# Patient Record
Sex: MALE | Race: WHITE | NOT HISPANIC OR LATINO | Employment: FULL TIME | URBAN - METROPOLITAN AREA
[De-identification: names, ages, dates, MRNs, and addresses within clinical notes are randomized per-mention and may not be internally consistent; named-entity substitution may affect disease eponyms.]

---

## 2017-02-13 ENCOUNTER — TELEPHONE (OUTPATIENT)
Dept: FAMILY MEDICINE | Facility: OTHER | Age: 42
End: 2017-02-13

## 2017-02-13 DIAGNOSIS — E11.9 TYPE 2 DIABETES, HBA1C GOAL < 7% (H): Primary | ICD-10-CM

## 2017-02-13 NOTE — TELEPHONE ENCOUNTER
Summary:    Patient is due/failing the following:   TSH, A1C and FOLLOW UP    Action needed:   Patient needs office visit for diabetic follow up. and Patient needs non-fasting lab only appointment    Type of outreach:    Phone, spoke to patient.  patient scheduled OV on 02/23/17    Questions for provider review:    None                                                                                                                                    Marcela Collins       Chart routed to Care Team .        Panel Management Review      Patient has the following on his problem list:     Diabetes    ASA:     Last A1C  Lab Results   Component Value Date    A1C 10.3 11/07/2016    A1C 6.7 02/18/2015     A1C tested: Failed    Last LDL:    Lab Results   Component Value Date    CHOL 149 12/16/2016     Lab Results   Component Value Date    HDL 65 12/16/2016     Lab Results   Component Value Date    LDL 63 12/16/2016     Lab Results   Component Value Date    TRIG 104 12/16/2016     Lab Results   Component Value Date    CHOLHDLRATIO 2.4 03/13/2015     Lab Results   Component Value Date    NHDL 84 12/16/2016       Is the patient on a Statin? NO             Is the patient on Aspirin? NO        Last three blood pressure readings:  BP Readings from Last 3 Encounters:   11/07/16 138/80   10/31/16 128/81   03/06/15 116/82            Tobacco History:     History   Smoking Status     Current Some Day Smoker     Types: Dip, chew, snus or snuff, Cigarettes   Smokeless Tobacco     Current User           Composite cancer screening  Chart review shows that this patient is due/due soon for the following None

## 2017-02-21 NOTE — PROGRESS NOTES
SUBJECTIVE:                                                    Estevan Callejas is a 41 year old male who presents to clinic today for the following health issues:      HPI    RESPIRATORY SYMPTOMS      Duration: 1 month    Description  nasal congestion, facial pain/pressure, cough and hoarse voice    Severity: moderate    Accompanying signs and symptoms: None    History (predisposing factors):  none    Precipitating or alleviating factors: None    Therapies tried and outcome:  antihistamine antibiotic- little relief     Diabetes Follow-up      Patient is checking blood sugars: 2-3 times a week    Diabetic concerns: None     Symptoms of hypoglycemia (low blood sugar): none     Paresthesias (numbness or burning in feet) or sores: Yes right foot burning     Date of last diabetic eye exam: hasn't had one       Problem list and histories reviewed & adjusted, as indicated.  Additional history: as documented        Patient Active Problem List   Diagnosis     Peripheral neuropathy - near S2 dermatome     Type 2 diabetes, HbA1c goal < 7% (H)     Low back pain     History reviewed. No pertinent past surgical history.    Social History   Substance Use Topics     Smoking status: Current Some Day Smoker     Types: Dip, chew, snus or snuff, Cigarettes     Smokeless tobacco: Current User     Alcohol use Yes     History reviewed. No pertinent family history.      Current Outpatient Prescriptions   Medication Sig Dispense Refill     gabapentin (NEURONTIN) 300 MG capsule Take 1 tablet (300 mg) every night for 1-3 days, then 1 tablet twice daily for 1-3 days, then 1 tablet three times daily 90 capsule 0     lisinopril (PRINIVIL/ZESTRIL) 2.5 MG tablet Take 1 tablet (2.5 mg) by mouth daily 90 tablet 1     azithromycin (ZITHROMAX) 250 MG tablet Two tablets first day, then one tablet daily for four days. 6 tablet 0     aspirin 81 MG tablet Take 1 tablet (81 mg) by mouth daily 90 tablet 3     metFORMIN (GLUCOPHAGE) 500 MG tablet Start 1  pill(500mg ) -2 times a day. After 10 days - increase to 2 pills (1000mg ) -2 times a day 120 tablet 2     blood glucose monitoring (NO BRAND SPECIFIED) test strip Use to test blood sugars  One  times daily in am 90 each 3     blood glucose monitoring (NO BRAND SPECIFIED) meter device kit Use to test blood sugar one times daily or as directed. 1 kit 0     blood glucose (NO BRAND SPECIFIED) lancets standard Use to test blood sugar one times daily or as directed. 1 Box 0     No Known Allergies  BP Readings from Last 3 Encounters:   03/09/17 132/84   02/23/17 130/74   11/07/16 138/80    Wt Readings from Last 3 Encounters:   03/09/17 245 lb (111.1 kg)   02/23/17 244 lb (110.7 kg)   11/07/16 260 lb (117.9 kg)                  Labs reviewed in EPIC    ROS:  Constitutional, HEENT, cardiovascular, pulmonary, gi and gu systems are negative, except as otherwise noted.    OBJECTIVE:                                                    /84  Pulse 64  Temp 98.8  F (37.1  C) (Temporal)  Resp 16  Wt 245 lb (111.1 kg)  BMI 34.66 kg/m2  Body mass index is 34.66 kg/(m^2).  Physical Exam   Constitutional: He is oriented to person, place, and time. He appears well-developed and well-nourished.   HENT:   Head: Normocephalic and atraumatic.   Cardiovascular: Normal rate and regular rhythm.    Pulmonary/Chest: Effort normal and breath sounds normal.   Neurological: He is alert and oriented to person, place, and time.   Psychiatric: He has a normal mood and affect.         Diagnostic Test Results:  none      ASSESSMENT/PLAN:                                                      Problem List Items Addressed This Visit     Type 2 diabetes, HbA1c goal < 7% (H) - Primary     -Last a1- improved  -Home fasting Blood sugars- well controlled  -Last eye exam- Not UTD-   -Diabetic foot exam - mild neuropathy  -Last urine microalbumin-On ACE  -Last LDL-Continue statin  -Continue current medications  -Discussed diet , lifestyle modifications,  exercise and weight loss  -RTC in 3-6 months for follow up             Relevant Medications    gabapentin (NEURONTIN) 300 MG capsule    lisinopril (PRINIVIL/ZESTRIL) 2.5 MG tablet    aspirin 81 MG tablet    Other Relevant Orders    Hemoglobin A1c      Other Visit Diagnoses     Screening for diabetic retinopathy        Relevant Orders    OPHTHALMOLOGY ADULT REFERRAL    Screening for diabetic peripheral neuropathy        Relevant Orders    FOOT EXAM  NO CHARGE [30130.114] (Completed)    Need for prophylactic vaccination and inoculation against influenza        Need for prophylactic vaccination against Streptococcus pneumoniae (pneumococcus)        Acute bronchitis, unspecified organism        Relevant Medications    azithromycin (ZITHROMAX) 250 MG tablet         Lawanda Barrera MD  [facility]

## 2017-02-23 ENCOUNTER — OFFICE VISIT (OUTPATIENT)
Dept: FAMILY MEDICINE | Facility: OTHER | Age: 42
End: 2017-02-23
Payer: COMMERCIAL

## 2017-02-23 VITALS
BODY MASS INDEX: 34.16 KG/M2 | OXYGEN SATURATION: 96 % | SYSTOLIC BLOOD PRESSURE: 130 MMHG | HEART RATE: 80 BPM | WEIGHT: 244 LBS | RESPIRATION RATE: 12 BRPM | DIASTOLIC BLOOD PRESSURE: 74 MMHG | HEIGHT: 71 IN | TEMPERATURE: 97.8 F

## 2017-02-23 DIAGNOSIS — R07.0 THROAT PAIN: Primary | ICD-10-CM

## 2017-02-23 DIAGNOSIS — J01.00 ACUTE NON-RECURRENT MAXILLARY SINUSITIS: ICD-10-CM

## 2017-02-23 PROCEDURE — 99213 OFFICE O/P EST LOW 20 MIN: CPT | Performed by: PHYSICIAN ASSISTANT

## 2017-02-23 RX ORDER — AMOXICILLIN 875 MG
875 TABLET ORAL 2 TIMES DAILY
Qty: 20 TABLET | Refills: 0 | Status: SHIPPED | OUTPATIENT
Start: 2017-02-23 | End: 2017-03-09

## 2017-02-23 ASSESSMENT — PAIN SCALES - GENERAL: PAINLEVEL: NO PAIN (0)

## 2017-02-23 NOTE — MR AVS SNAPSHOT
"              After Visit Summary   2/23/2017    Estevan Callejas    MRN: [id number]           Patient Information     Date Of Birth          1975        Visit Information        Provider Department      2/23/2017 12:00 PM Janay Gracia PA-C [facility]        Today's Diagnoses     Throat pain    -  1    Acute non-recurrent maxillary sinusitis           Follow-ups after your visit        Your next 10 appointments already scheduled     Mar 09, 2017  5:40 PM CST   Office Visit with Lawanda Barrera MD   [facility]    [address] [place] [address] [id number] [phone]           Bring a current list of meds and any records pertaining to this visit.  For Physicals, please bring immunization records and any forms needing to be filled out.  Please arrive 10 minutes early to complete paperwork.              Who to contact     If you have questions or need follow up information about today's clinic visit or your schedule please contact [facility] directly at [phone].  Normal or non-critical lab and imaging results will be communicated to you by Servhawkhart, letter or phone within 4 business days after the clinic has received the results. If you do not hear from us within 7 days, please contact the clinic through Keisenset or phone. If you have a critical or abnormal lab result, we will notify you by phone as soon as possible.  Submit refill requests through Big Frame or call your pharmacy and they will forward the refill request to us. Please allow 3 business days for your refill to be completed.          Additional Information About Your Visit        [email or web address] Information     Big Frame lets you send messages to your doctor, view your test results, renew your prescriptions, schedule appointments and more. To sign up, go to [email or web address] . Click on \"Log in\" on the left side of the screen, which will take you to the Welcome page. " "Then click on \"Sign up Now\" on the right side of the page.     You will be asked to enter the access code listed below, as well as some personal information. Please follow the directions to create your username and password.     Your access code is: [email or web address]  Expires: 2017 12:09 PM     Your access code will  in 90 days. If you need help or a new code, please call your [place] clinic or [phone].        Care EveryWhere ID     This is your Care EveryWhere ID. This could be used by other organizations to access your [place] medical records  [id number]        Your Vitals Were     Pulse Temperature Respirations Height Pulse Oximetry BMI (Body Mass Index)    80 97.8  F (36.6  C) (Temporal) 12 5' 10.5\" (1.791 m) 96% 34.52 kg/m2       Blood Pressure from Last 3 Encounters:   17 130/74   16 138/80   10/31/16 128/81    Weight from Last 3 Encounters:   17 244 lb (110.7 kg)   16 260 lb (117.9 kg)   10/31/16 258 lb (117 kg)              Today, you had the following     No orders found for display         Today's Medication Changes          These changes are accurate as of: 17  1:16 PM.  If you have any questions, ask your nurse or doctor.               Start taking these medicines.        Dose/Directions    amoxicillin 875 MG tablet   Commonly known as:  AMOXIL   Used for:  Acute non-recurrent maxillary sinusitis   Started by:  Janay Gracia PA-C        Dose:  875 mg   Take 1 tablet (875 mg) by mouth 2 times daily   Quantity:  20 tablet   Refills:  0            Where to get your medicines      These medications were sent to [facility] - [address]  [address] [place] [address]     Phone:  [phone]     amoxicillin 875 MG tablet                Primary Care Provider Office Phone # Fax #    Lawanda Barrera [phone]       [facility] [address] [facility] [address]    [facility] [address]        Thank " you!     Thank you for choosing [facility]  for your care. Our goal is always to provide you with excellent care. Hearing back from our patients is one way we can continue to improve our services. Please take a few minutes to complete the written survey that you may receive in the mail after your visit with us. Thank you!             Your Updated Medication List - Protect others around you: Learn how to safely use, store and throw away your medicines at www.disposemymeds.org.          This list is accurate as of: 2/23/17  1:16 PM.  Always use your most recent med list.                   Brand Name Dispense Instructions for use    amitriptyline 50 MG tablet    ELAVIL    90 tablet    Take 1 tablet (50 mg) by mouth At Bedtime       amoxicillin 875 MG tablet    AMOXIL    20 tablet    Take 1 tablet (875 mg) by mouth 2 times daily       blood glucose lancets standard    no brand specified    1 Box    Use to test blood sugar one times daily or as directed.       blood glucose monitoring meter device kit    no brand specified    1 kit    Use to test blood sugar one times daily or as directed.       blood glucose monitoring test strip    no brand specified    90 each    Use to test blood sugars  One  times daily in am       metFORMIN 500 MG tablet    GLUCOPHAGE    120 tablet    Start 1 pill(500mg ) -2 times a day. After 10 days - increase to 2 pills (1000mg ) -2 times a day

## 2017-02-23 NOTE — NURSING NOTE
"Chief Complaint   Patient presents with     Hoarse     Pharyngitis       Initial /74 (BP Location: Left arm, Patient Position: Chair, Cuff Size: Adult Regular)  Pulse 80  Temp 97.8  F (36.6  C) (Temporal)  Resp 12  Ht 5' 10.5\" (1.791 m)  Wt 244 lb (110.7 kg)  SpO2 96%  BMI 34.52 kg/m2 Estimated body mass index is 34.52 kg/(m^2) as calculated from the following:    Height as of this encounter: 5' 10.5\" (1.791 m).    Weight as of this encounter: 244 lb (110.7 kg).  Medication Reconciliation: complete     Bijal Jha CMA (AAMA)      "

## 2017-02-23 NOTE — LETTER
[facility]  [address] [facility] [address]  [phone]  Dept: [phone]      2/23/2017    Re: Estevan Callejas      TO WHOM IT MAY CONCERN:    Estevan Callejas  was seen on February 23, 2017 .  Please excuse him  until Feb 24,2017 due to illness.    Cordially,        Janay Gracia PA-C  [facility]

## 2017-03-09 ENCOUNTER — OFFICE VISIT (OUTPATIENT)
Dept: FAMILY MEDICINE | Facility: OTHER | Age: 42
End: 2017-03-09
Payer: COMMERCIAL

## 2017-03-09 VITALS
BODY MASS INDEX: 34.66 KG/M2 | TEMPERATURE: 98.8 F | WEIGHT: 245 LBS | SYSTOLIC BLOOD PRESSURE: 132 MMHG | HEART RATE: 64 BPM | RESPIRATION RATE: 16 BRPM | DIASTOLIC BLOOD PRESSURE: 84 MMHG

## 2017-03-09 DIAGNOSIS — E11.9 TYPE 2 DIABETES MELLITUS WITHOUT COMPLICATION, WITHOUT LONG-TERM CURRENT USE OF INSULIN (H): Primary | ICD-10-CM

## 2017-03-09 DIAGNOSIS — Z23 NEED FOR PROPHYLACTIC VACCINATION AND INOCULATION AGAINST INFLUENZA: ICD-10-CM

## 2017-03-09 DIAGNOSIS — Z13.5 SCREENING FOR DIABETIC RETINOPATHY: ICD-10-CM

## 2017-03-09 DIAGNOSIS — J20.9 ACUTE BRONCHITIS, UNSPECIFIED ORGANISM: ICD-10-CM

## 2017-03-09 DIAGNOSIS — Z23 NEED FOR PROPHYLACTIC VACCINATION AGAINST STREPTOCOCCUS PNEUMONIAE (PNEUMOCOCCUS): ICD-10-CM

## 2017-03-09 DIAGNOSIS — Z13.89 SCREENING FOR DIABETIC PERIPHERAL NEUROPATHY: ICD-10-CM

## 2017-03-09 LAB — HBA1C MFR BLD: 6.3 % (ref 4.3–6)

## 2017-03-09 PROCEDURE — 36415 COLL VENOUS BLD VENIPUNCTURE: CPT | Performed by: FAMILY MEDICINE

## 2017-03-09 PROCEDURE — 83036 HEMOGLOBIN GLYCOSYLATED A1C: CPT | Performed by: FAMILY MEDICINE

## 2017-03-09 PROCEDURE — 84443 ASSAY THYROID STIM HORMONE: CPT | Performed by: FAMILY MEDICINE

## 2017-03-09 PROCEDURE — 99213 OFFICE O/P EST LOW 20 MIN: CPT | Performed by: FAMILY MEDICINE

## 2017-03-09 PROCEDURE — 99207 C FOOT EXAM  NO CHARGE: CPT | Performed by: FAMILY MEDICINE

## 2017-03-09 RX ORDER — AZITHROMYCIN 250 MG/1
TABLET, FILM COATED ORAL
Qty: 6 TABLET | Refills: 0 | Status: SHIPPED | OUTPATIENT
Start: 2017-03-09 | End: 2017-06-12

## 2017-03-09 RX ORDER — LISINOPRIL 2.5 MG/1
2.5 TABLET ORAL DAILY
Qty: 90 TABLET | Refills: 1 | Status: SHIPPED | OUTPATIENT
Start: 2017-03-09 | End: 2018-01-30

## 2017-03-09 RX ORDER — GABAPENTIN 300 MG/1
CAPSULE ORAL
Qty: 90 CAPSULE | Refills: 0 | Status: SHIPPED | OUTPATIENT
Start: 2017-03-09 | End: 2017-05-09

## 2017-03-09 NOTE — MR AVS SNAPSHOT
After Visit Summary   3/9/2017    Estevan Callejas    MRN: [id number]           Patient Information     Date Of Birth          1975        Visit Information        Provider Department      3/9/2017 5:40 PM Lawanda Barrera MD [facility]        Today's Diagnoses     Type 2 diabetes mellitus without complication, without long-term current use of insulin (H)    -  1    Screening for diabetic retinopathy        Screening for diabetic peripheral neuropathy        Need for prophylactic vaccination and inoculation against influenza        Need for prophylactic vaccination against Streptococcus pneumoniae (pneumococcus)        Acute bronchitis, unspecified organism           Follow-ups after your visit        Additional Services     OPHTHALMOLOGY ADULT REFERRAL       Your provider has referred you to: N: [facility] [place] [phone]  [email or web address]    Please be aware that coverage of these services is subject to the terms and limitations of your health insurance plan.  Call member services at your health plan with any benefit or coverage questions.      Please bring the following with you to your appointment:    (1) Any X-Rays, CTs or MRIs which have been performed.  Contact the facility where they were done to arrange for  prior to your scheduled appointment.    (2) List of current medications  (3) This referral request   (4) Any documents/labs given to you for this referral                  Who to contact     If you have questions or need follow up information about today's clinic visit or your schedule please contact [facility] directly at [phone].  Normal or non-critical lab and imaging results will be communicated to you by MyChart, letter or phone within 4 business days after the clinic has received the results. If you do not hear from us within 7 days, please contact the clinic through MyChart or phone. If  "you have a critical or abnormal lab result, we will notify you by phone as soon as possible.  Submit refill requests through Stylenda or call your pharmacy and they will forward the refill request to us. Please allow 3 business days for your refill to be completed.          Additional Information About Your Visit        SHEEXhart Information     Stylenda lets you send messages to your doctor, view your test results, renew your prescriptions, schedule appointments and more. To sign up, go to [email or web address] . Click on \"Log in\" on the left side of the screen, which will take you to the Welcome page. Then click on \"Sign up Now\" on the right side of the page.     You will be asked to enter the access code listed below, as well as some personal information. Please follow the directions to create your username and password.     Your access code is: [email or web address]  Expires: 2017  1:09 PM     Your access code will  in 90 days. If you need help or a new code, please call your [place] clinic or [phone].        Care EveryWhere ID     This is your Care EveryWhere ID. This could be used by other organizations to access your [place] medical records  [id number]        Your Vitals Were     Pulse Temperature Respirations BMI (Body Mass Index)          64 98.8  F (37.1  C) (Temporal) 16 34.66 kg/m2         Blood Pressure from Last 3 Encounters:   17 132/84   17 130/74   16 138/80    Weight from Last 3 Encounters:   17 245 lb (111.1 kg)   17 244 lb (110.7 kg)   16 260 lb (117.9 kg)              We Performed the Following     FOOT EXAM  NO CHARGE [51949.114]     Hemoglobin A1c     OPHTHALMOLOGY ADULT REFERRAL     TSH with free T4 reflex          Today's Medication Changes          These changes are accurate as of: 3/9/17 11:59 PM.  If you have any questions, ask your nurse or doctor.               Start taking these medicines.        Dose/Directions    aspirin 81 MG tablet "   Used for:  Type 2 diabetes mellitus without complication, without long-term current use of insulin (H)   Started by:  Lawanda Barrera MD        Dose:  81 mg   Take 1 tablet (81 mg) by mouth daily   Quantity:  90 tablet   Refills:  3       azithromycin 250 MG tablet   Commonly known as:  ZITHROMAX   Used for:  Acute bronchitis, unspecified organism   Started by:  Lawanda Barrera MD        Two tablets first day, then one tablet daily for four days.   Quantity:  6 tablet   Refills:  0       gabapentin 300 MG capsule   Commonly known as:  NEURONTIN   Used for:  Type 2 diabetes mellitus without complication, without long-term current use of insulin (H)   Started by:  Lawanda Barrera MD        Take 1 tablet (300 mg) every night for 1-3 days, then 1 tablet twice daily for 1-3 days, then 1 tablet three times daily   Quantity:  90 capsule   Refills:  0       lisinopril 2.5 MG tablet   Commonly known as:  PRINIVIL/Zestril   Used for:  Type 2 diabetes mellitus without complication, without long-term current use of insulin (H)   Started by:  Lawanda Barrera MD        Dose:  2.5 mg   Take 1 tablet (2.5 mg) by mouth daily   Quantity:  90 tablet   Refills:  1         Stop taking these medicines if you haven't already. Please contact your care team if you have questions.     amitriptyline 50 MG tablet   Commonly known as:  ELAVIL   Stopped by:  Lawanda Barrera MD                Where to get your medicines      These medications were sent to [facility] - [address]   [address] [place] [address]     Phone:  [phone]     aspirin 81 MG tablet    azithromycin 250 MG tablet    gabapentin 300 MG capsule    lisinopril 2.5 MG tablet                Primary Care Provider Office Phone # Fax #    Lawanda Barrera [phone]       [facility] [address] [address]        Thank you!     Thank you for choosing [place]  [facility]  for your care. Our goal is always to provide you with excellent care. Hearing back from our patients is one way we can continue to improve our services. Please take a few minutes to complete the written survey that you may receive in the mail after your visit with us. Thank you!             Your Updated Medication List - Protect others around you: Learn how to safely use, store and throw away your medicines at www.disposemymeds.org.          This list is accurate as of: 3/9/17 11:59 PM.  Always use your most recent med list.                   Brand Name Dispense Instructions for use    aspirin 81 MG tablet     90 tablet    Take 1 tablet (81 mg) by mouth daily       azithromycin 250 MG tablet    ZITHROMAX    6 tablet    Two tablets first day, then one tablet daily for four days.       blood glucose lancets standard    no brand specified    1 Box    Use to test blood sugar one times daily or as directed.       blood glucose monitoring meter device kit    no brand specified    1 kit    Use to test blood sugar one times daily or as directed.       blood glucose monitoring test strip    no brand specified    90 each    Use to test blood sugars  One  times daily in am       gabapentin 300 MG capsule    NEURONTIN    90 capsule    Take 1 tablet (300 mg) every night for 1-3 days, then 1 tablet twice daily for 1-3 days, then 1 tablet three times daily       lisinopril 2.5 MG tablet    PRINIVIL/Zestril    90 tablet    Take 1 tablet (2.5 mg) by mouth daily       metFORMIN 500 MG tablet    GLUCOPHAGE    120 tablet    Start 1 pill(500mg ) -2 times a day. After 10 days - increase to 2 pills (1000mg ) -2 times a day

## 2017-03-09 NOTE — NURSING NOTE
"Chief Complaint   Patient presents with     Diabetes     Panel Management     height, mychart, pneumovax, flu, asa/statin on med list, eye exam, foot exam, tsh       Initial /84  Pulse 64  Temp 98.8  F (37.1  C) (Temporal)  Resp 16  Wt 245 lb (111.1 kg)  BMI 34.66 kg/m2 Estimated body mass index is 34.66 kg/(m^2) as calculated from the following:    Height as of 2/23/17: 5' 10.5\" (1.791 m).    Weight as of this encounter: 245 lb (111.1 kg).  Medication Reconciliation: complete   Nikki Morse CMA    "

## 2017-03-10 LAB — TSH SERPL DL<=0.005 MIU/L-ACNC: 3.28 MU/L (ref 0.4–4)

## 2017-03-13 NOTE — ASSESSMENT & PLAN NOTE
-Last a1- improved  -Home fasting Blood sugars- well controlled  -Last eye exam- Not UTD-   -Diabetic foot exam - mild neuropathy  -Last urine microalbumin-On ACE  -Last LDL-Continue statin  -Continue current medications  -Discussed diet , lifestyle modifications, exercise and weight loss  -RTC in 3-6 months for follow up

## 2017-03-16 DIAGNOSIS — E11.8 TYPE 2 DIABETES MELLITUS WITH COMPLICATION, WITHOUT LONG-TERM CURRENT USE OF INSULIN (H): ICD-10-CM

## 2017-03-16 NOTE — TELEPHONE ENCOUNTER
metformin         Last Written Prescription Date: 11/07/16  Last Fill Quantity: 120, # refills: 2  Last Office Visit with [facility], [facility] or [facility] prescribing provider:  03/09/17        BP Readings from Last 3 Encounters:   03/09/17 132/84   02/23/17 130/74   11/07/16 138/80     Lab Results   Component Value Date    MICROL 7 12/16/2016     No results found for: MICROALBUMIN  Creatinine   Date Value Ref Range Status   11/07/2016 0.80 0.66 - 1.25 mg/dL Final   ]  GFR Estimate   Date Value Ref Range Status   11/07/2016 >90  Non  GFR Calc   >60 mL/min/1.7m2 Final   02/18/2015 >90  Non  GFR Calc   >60 mL/min/1.7m2 Final     GFR Estimate If Black   Date Value Ref Range Status   11/07/2016 >90   GFR Calc   >60 mL/min/1.7m2 Final   02/18/2015 >90   GFR Calc   >60 mL/min/1.7m2 Final     Lab Results   Component Value Date    CHOL 149 12/16/2016     Lab Results   Component Value Date    HDL 65 12/16/2016     Lab Results   Component Value Date    LDL 63 12/16/2016     Lab Results   Component Value Date    TRIG 104 12/16/2016     Lab Results   Component Value Date    CHOLHDLRATIO 2.4 03/13/2015     Lab Results   Component Value Date    AST 18 11/07/2016     Lab Results   Component Value Date    ALT 57 11/07/2016     Lab Results   Component Value Date    A1C 6.3 03/09/2017    A1C 10.3 11/07/2016    A1C 6.7 02/18/2015     Potassium   Date Value Ref Range Status   11/07/2016 4.3 3.4 - 5.3 mmol/L Final

## 2017-03-16 NOTE — TELEPHONE ENCOUNTER
Reason for call:  Medication  Reason for Call:  Medication or medication refill:    Do you use a [facility]?  Name of the pharmacy and phone number for the current request:  [facility] - [phone]    Name of the medication requested: metFORMIN (GLUCOPHAGE) 500 MG tablet    Other request: na    Can we leave a detailed message on this number? YES    Phone number patient can be reached at: Home number on file [phone] (home)    Best Time: anytime     Call taken on 3/16/2017 at 9:08 AM by Leatha Pimentel

## 2017-04-28 ENCOUNTER — TELEPHONE (OUTPATIENT)
Dept: FAMILY MEDICINE | Facility: OTHER | Age: 42
End: 2017-04-28

## 2017-04-28 NOTE — LETTER
[address]  [facility] [address]  [phone]        April 28, 2017    Estevan Callejas  [address] [place] [id number]          Dear Estevan,    This is a reminder letter so schedule a referral was placed on 3/9/17 for your diabetic eye exam to:     [facility] - [place] [phone]  [email or web address]    If you need help scheduling this please let us know.  Thank you!      Sincerely,        Ana Barrera MD/elizabeth

## 2017-04-28 NOTE — TELEPHONE ENCOUNTER
Called [facility] as there is a referral from 3/9/17 and he has not scheduled yet.  Reminder letter sent.

## 2017-05-09 DIAGNOSIS — E11.9 TYPE 2 DIABETES MELLITUS WITHOUT COMPLICATION, WITHOUT LONG-TERM CURRENT USE OF INSULIN (H): ICD-10-CM

## 2017-05-09 NOTE — TELEPHONE ENCOUNTER
gabapentin      Last Written Prescription Date: 3-9-17  Last Fill Quantity: 90,  # refills: 0   Last Office Visit with [facility], P or [facility] prescribing provider: 3-9-17                                         Next 5 appointments (look out 90 days)     Jun 08, 2017  5:20 PM CDT   Office Visit with Lawanda Barrera MD   [facility]    [address]  [place] [address] [phone]                  [facility]  [phone]

## 2017-05-11 RX ORDER — GABAPENTIN 300 MG/1
300 CAPSULE ORAL 3 TIMES DAILY
Qty: 270 CAPSULE | Refills: 1 | Status: SHIPPED | OUTPATIENT
Start: 2017-05-11 | End: 2018-03-28

## 2017-05-31 NOTE — PROGRESS NOTES
SUBJECTIVE:                                                    Estevan Callejas is a 41 year old male who presents to clinic today for the following health issues:      HPI    Diabetes Follow-up      Patient is checking blood sugars: rarely.      Diabetic concerns: None     Symptoms of hypoglycemia (low blood sugar): none     Paresthesias (numbness or burning in feet) or sores: No     Date of last diabetic eye exam: Will Schedule       Problem list and histories reviewed & adjusted, as indicated.  Additional history: as documented        Patient Active Problem List   Diagnosis     Peripheral neuropathy - near S2 dermatome     Type 2 diabetes, HbA1c goal < 7% (H)     Low back pain     Morbid obesity (H)     History reviewed. No pertinent surgical history.    Social History   Substance Use Topics     Smoking status: Current Some Day Smoker     Types: Dip, chew, snus or snuff, Cigarettes     Smokeless tobacco: Current User     Alcohol use Yes     History reviewed. No pertinent family history.      Current Outpatient Prescriptions   Medication Sig Dispense Refill     gabapentin (NEURONTIN) 300 MG capsule Take 1 capsule (300 mg) by mouth 3 times daily 270 capsule 1     metFORMIN (GLUCOPHAGE) 500 MG tablet Take 2 tablets (1,000 mg) by mouth 2 times daily (with meals) 360 tablet 1     lisinopril (PRINIVIL/ZESTRIL) 2.5 MG tablet Take 1 tablet (2.5 mg) by mouth daily 90 tablet 1     aspirin 81 MG tablet Take 1 tablet (81 mg) by mouth daily 90 tablet 3     blood glucose monitoring (NO BRAND SPECIFIED) test strip Use to test blood sugars  One  times daily in am 90 each 3     blood glucose monitoring (NO BRAND SPECIFIED) meter device kit Use to test blood sugar one times daily or as directed. 1 kit 0     blood glucose (NO BRAND SPECIFIED) lancets standard Use to test blood sugar one times daily or as directed. 1 Box 0     No Known Allergies  BP Readings from Last 3 Encounters:   06/12/17 132/82   03/09/17 132/84   02/23/17  "130/74    Wt Readings from Last 3 Encounters:   06/12/17 249 lb (112.9 kg)   03/09/17 245 lb (111.1 kg)   02/23/17 244 lb (110.7 kg)                  Labs reviewed in EPIC    ROS:  Constitutional, HEENT, cardiovascular, pulmonary, gi and gu systems are negative, except as otherwise noted.    OBJECTIVE:                                                    /82 (BP Location: Right arm, Patient Position: Chair, Cuff Size: Adult Large)  Pulse 55  Temp 98.2  F (36.8  C) (Oral)  Resp 14  Ht 5' 10.5\" (1.791 m)  Wt 249 lb (112.9 kg)  SpO2 98%  BMI 35.22 kg/m2  Body mass index is 35.22 kg/(m^2).  Physical Exam   Constitutional: He appears well-developed and well-nourished.   HENT:   Head: Normocephalic and atraumatic.   Cardiovascular: Normal rate and regular rhythm.    Pulmonary/Chest: Effort normal and breath sounds normal.   Musculoskeletal:   Diabetic foot exam shows mild neuropathy. Normal pulses.   Psychiatric: He has a normal mood and affect.         Diagnostic Test Results:  none      ASSESSMENT/PLAN:                                                      Problem List Items Addressed This Visit     Type 2 diabetes, HbA1c goal < 7% (H) - Primary     -Last a1- improved  -Home fasting Blood sugars- well controlled  -Last eye exam- Not UTD- Patient to schedule  -Diabetic foot exam - mild neuropathy. Continue gabapentin  -Last urine microalbumin-On ACE  -Last LDL-Continue statin  -Continue current medications  -Discussed diet , lifestyle modifications, exercise and weight loss  -RTC in 6 months for follow up             Relevant Orders    Hemoglobin A1c      Other Visit Diagnoses     Screening for diabetic retinopathy        Need for prophylactic vaccination against Streptococcus pneumoniae (pneumococcus)        Type 2 diabetes mellitus without complication, without long-term current use of insulin (H)               Lawanda Barrera MD  [facility]  "

## 2017-06-12 ENCOUNTER — OFFICE VISIT (OUTPATIENT)
Dept: FAMILY MEDICINE | Facility: OTHER | Age: 42
End: 2017-06-12
Payer: COMMERCIAL

## 2017-06-12 VITALS
HEART RATE: 55 BPM | OXYGEN SATURATION: 98 % | HEIGHT: 71 IN | SYSTOLIC BLOOD PRESSURE: 132 MMHG | BODY MASS INDEX: 34.86 KG/M2 | TEMPERATURE: 98.2 F | RESPIRATION RATE: 14 BRPM | WEIGHT: 249 LBS | DIASTOLIC BLOOD PRESSURE: 82 MMHG

## 2017-06-12 DIAGNOSIS — E11.69 TYPE 2 DIABETES MELLITUS WITH OTHER SPECIFIED COMPLICATION, WITHOUT LONG-TERM CURRENT USE OF INSULIN (H): Primary | ICD-10-CM

## 2017-06-12 PROBLEM — E66.01 MORBID OBESITY (H): Status: ACTIVE | Noted: 2017-06-12

## 2017-06-12 LAB — HBA1C MFR BLD: 5.8 % (ref 4.3–6)

## 2017-06-12 PROCEDURE — 99213 OFFICE O/P EST LOW 20 MIN: CPT | Performed by: FAMILY MEDICINE

## 2017-06-12 PROCEDURE — 83036 HEMOGLOBIN GLYCOSYLATED A1C: CPT | Performed by: FAMILY MEDICINE

## 2017-06-12 PROCEDURE — 36415 COLL VENOUS BLD VENIPUNCTURE: CPT | Performed by: FAMILY MEDICINE

## 2017-06-12 ASSESSMENT — PAIN SCALES - GENERAL: PAINLEVEL: NO PAIN (0)

## 2017-06-12 NOTE — NURSING NOTE
"Chief Complaint   Patient presents with     Diabetes     Panel Management     pneumovax, DM eye exam        Initial /82 (BP Location: Right arm, Patient Position: Chair, Cuff Size: Adult Large)  Pulse 55  Temp 98.2  F (36.8  C) (Oral)  Resp 14  Ht 5' 10.5\" (1.791 m)  Wt 249 lb (112.9 kg)  SpO2 98%  BMI 35.22 kg/m2 Estimated body mass index is 35.22 kg/(m^2) as calculated from the following:    Height as of this encounter: 5' 10.5\" (1.791 m).    Weight as of this encounter: 249 lb (112.9 kg).  Medication Reconciliation: complete   Missy Chi CMA (AAMA)      "

## 2017-06-12 NOTE — MR AVS SNAPSHOT
"              After Visit Summary   6/12/2017    Estevan Callejas    MRN: [id number]           Patient Information     Date Of Birth          1975        Visit Information        Provider Department      6/12/2017 4:00 PM Lawanda Barrera MD [facility]        Today's Diagnoses     Type 2 diabetes mellitus with other specified complication, without long-term current use of insulin (H)    -  1    Screening for diabetic retinopathy        Need for prophylactic vaccination against Streptococcus pneumoniae (pneumococcus)           Follow-ups after your visit        Follow-up notes from your care team     Return in about 6 months (around 12/12/2017).      Future tests that were ordered for you today     Open Future Orders        Priority Expected Expires Ordered    Hemoglobin A1c Routine  6/12/2018 6/12/2017            Who to contact     If you have questions or need follow up information about today's clinic visit or your schedule please contact [facility] directly at [phone].  Normal or non-critical lab and imaging results will be communicated to you by MyChart, letter or phone within 4 business days after the clinic has received the results. If you do not hear from us within 7 days, please contact the clinic through [employer] or phone. If you have a critical or abnormal lab result, we will notify you by phone as soon as possible.  Submit refill requests through [employer] or call your pharmacy and they will forward the refill request to us. Please allow 3 business days for your refill to be completed.          Additional Information About Your Visit        MyChart Information     [employer] lets you send messages to your doctor, view your test results, renew your prescriptions, schedule appointments and more. To sign up, go to [email or web address] . Click on \"Log in\" on the left side of the screen, which will take you to the Welcome page. Then click on \"Sign up Now\" on the right " "side of the page.     You will be asked to enter the access code listed below, as well as some personal information. Please follow the directions to create your username and password.     Your access code is: [email or web address]  Expires: 9/10/2017  4:32 PM     Your access code will  in 90 days. If you need help or a new code, please call your [place] clinic or [phone].        Care EveryWhere ID     This is your Care EveryWhere ID. This could be used by other organizations to access your [place] medical records  [id number]        Your Vitals Were     Pulse Temperature Respirations Height Pulse Oximetry BMI (Body Mass Index)    55 98.2  F (36.8  C) (Oral) 14 5' 10.5\" (1.791 m) 98% 35.22 kg/m2       Blood Pressure from Last 3 Encounters:   17 132/82   17 132/84   17 130/74    Weight from Last 3 Encounters:   17 249 lb (112.9 kg)   17 245 lb (111.1 kg)   17 244 lb (110.7 kg)                 Today's Medication Changes          These changes are accurate as of: 17  4:32 PM.  If you have any questions, ask your nurse or doctor.               Stop taking these medicines if you haven't already. Please contact your care team if you have questions.     azithromycin 250 MG tablet   Commonly known as:  ZITHROMAX   Stopped by:  Lawanda Barrera MD                    Primary Care Provider Office Phone # Fax #    Lawanda Barrera -[phone]       [facility] [address]    [place] [address]        Thank you!     Thank you for choosing [facility]  for your care. Our goal is always to provide you with excellent care. Hearing back from our patients is one way we can continue to improve our services. Please take a few minutes to complete the written survey that you may receive in the mail after your visit with us. Thank you!             Your Updated Medication List - Protect others around you: Learn how to safely use, store " and throw away your medicines at www.disposemymeds.org.          This list is accurate as of: 6/12/17  4:32 PM.  Always use your most recent med list.                   Brand Name Dispense Instructions for use    aspirin 81 MG tablet     90 tablet    Take 1 tablet (81 mg) by mouth daily       blood glucose lancets standard    no brand specified    1 Box    Use to test blood sugar one times daily or as directed.       blood glucose monitoring meter device kit    no brand specified    1 kit    Use to test blood sugar one times daily or as directed.       blood glucose monitoring test strip    no brand specified    90 each    Use to test blood sugars  One  times daily in am       gabapentin 300 MG capsule    NEURONTIN    270 capsule    Take 1 capsule (300 mg) by mouth 3 times daily       lisinopril 2.5 MG tablet    PRINIVIL/Zestril    90 tablet    Take 1 tablet (2.5 mg) by mouth daily       metFORMIN 500 MG tablet    GLUCOPHAGE    360 tablet    Take 2 tablets (1,000 mg) by mouth 2 times daily (with meals)

## 2017-06-12 NOTE — ASSESSMENT & PLAN NOTE
-Last a1- improved  -Home fasting Blood sugars- well controlled  -Last eye exam- Not UTD- Patient to schedule  -Diabetic foot exam - mild neuropathy. Continue gabapentin  -Last urine microalbumin-On ACE  -Last LDL-Continue statin  -Continue current medications  -Discussed diet , lifestyle modifications, exercise and weight loss  -RTC in 6 months for follow up

## 2017-06-29 NOTE — PROGRESS NOTES
"  SUBJECTIVE:                                                    Estevan Callejas is a 41 year old male who presents to clinic today for the following health issues:      Acute Illness--    Acute illness concerns: lost voice, sore throat  Onset: 2.5 weeks, not resolving    Fever: no    Chills/Sweats: YES    Headache (location?): no    Sinus Pressure:YES    Conjunctivitis:  No but dry    Ear Pain: YES- behind ears    Rhinorrhea: YES    Congestion: YES    Sore Throat: no     Cough: YES-productive of green sputum, worse in the morning    Wheeze: YES- some, no SOB    Decreased Appetite: no    Nausea: no    Vomiting: no    Diarrhea:  no    Dysuria/Freq.: no    Fatigue/Achiness: YES- aches    Sick/Strep Exposure: YES     Therapies Tried and outcome: nyquil, dayquil, cough drops       Problem list and histories reviewed & adjusted, as indicated.  Additional history: as documented    BP Readings from Last 3 Encounters:   02/23/17 130/74   11/07/16 138/80   10/31/16 128/81    Wt Readings from Last 3 Encounters:   02/23/17 244 lb (110.7 kg)   11/07/16 260 lb (117.9 kg)   10/31/16 258 lb (117 kg)              Labs reviewed in [facility]    ROS:  As documented above     OBJECTIVE:                                                    /74 (BP Location: Left arm, Patient Position: Chair, Cuff Size: Adult Regular)  Pulse 80  Temp 97.8  F (36.6  C) (Temporal)  Resp 12  Ht 5' 10.5\" (1.791 m)  Wt 244 lb (110.7 kg)  SpO2 96%  BMI 34.52 kg/m2  Body mass index is 34.52 kg/(m^2).  GENERAL: healthy, alert and no distress  EYES: Eyes grossly normal to inspection  HENT: normal cephalic/atraumatic, ear canals and TM's normal, nose and mouth without ulcers or lesions, nasal mucosa edematous , oral mucous membranes moist, tonsillar erythema and sinuses: maxillary tenderness on bilaterally  NECK: no adenopathy, no asymmetry, masses, or scars and thyroid normal to palpation  RESP: lungs clear to auscultation - no rales, rhonchi or wheezes  CV: " regular rate and rhythm, normal S1 S2, no S3 or S4, no murmur, click or rub, no peripheral edema and peripheral pulses strong  MS: no gross musculoskeletal defects noted, no edema    Diagnostic Test Results:  none      ASSESSMENT/PLAN:                                                        1. Acute non-recurrent maxillary sinusitis  otc meds prn, fluids, rest, rtw tomorrow work note given  - amoxicillin (AMOXIL) 875 MG tablet; Take 1 tablet (875 mg) by mouth 2 times daily  Dispense: 20 tablet; Refill: 0    2. Throat pain            Janay Gracia PA-C  [facility]  Electronically signed by Janay Gracia PA-C    How Severe Are Your Spot(S)?: mild Have Your Spot(S) Been Treated In The Past?: has not been treated Hpi Title: Evaluation of Skin Lesions

## 2017-12-20 ENCOUNTER — TELEPHONE (OUTPATIENT)
Dept: FAMILY MEDICINE | Facility: OTHER | Age: 42
End: 2017-12-20

## 2017-12-20 DIAGNOSIS — E11.9 TYPE 2 DIABETES, HBA1C GOAL < 7% (H): Primary | ICD-10-CM

## 2017-12-20 NOTE — TELEPHONE ENCOUNTER
Summary:    Patient is due/failing the following:   Eye exam, Creatinine, Microalbumin, A1C, FOLLOW UP and LDL    Action needed:   Patient needs office visit for diabetic follow up and fasting lab.    Type of outreach:    Phone, spoke to patient.  patient will call back and schedule after the new year    Questions for provider review:    None                                                                                                                                    Marcela Collins       Chart routed to Care Team .        Panel Management Review      Patient has the following on his problem list:     Diabetes    ASA: Passed    Last A1C  Lab Results   Component Value Date    A1C 5.8 06/12/2017    A1C 6.3 03/09/2017    A1C 10.3 11/07/2016    A1C 6.7 02/18/2015     A1C tested: Passed    Last LDL:    Lab Results   Component Value Date    CHOL 149 12/16/2016     Lab Results   Component Value Date    HDL 65 12/16/2016     Lab Results   Component Value Date    LDL 63 12/16/2016     Lab Results   Component Value Date    TRIG 104 12/16/2016     Lab Results   Component Value Date    CHOLHDLRATIO 2.4 03/13/2015     Lab Results   Component Value Date    NHDL 84 12/16/2016       Is the patient on a Statin? NO             Is the patient on Aspirin? YES    Medications     Salicylates    aspirin 81 MG tablet          Last three blood pressure readings:  BP Readings from Last 3 Encounters:   06/12/17 132/82   03/09/17 132/84   02/23/17 130/74            Tobacco History:     History   Smoking Status     Current Some Day Smoker     Types: Dip, chew, snus or snuff, Cigarettes   Smokeless Tobacco     Current User             Composite cancer screening  Chart review shows that this patient is due/due soon for the following None

## 2017-12-22 DIAGNOSIS — E11.8 TYPE 2 DIABETES MELLITUS WITH COMPLICATION, WITHOUT LONG-TERM CURRENT USE OF INSULIN (H): ICD-10-CM

## 2017-12-26 NOTE — TELEPHONE ENCOUNTER
Requested Prescriptions   Pending Prescriptions Disp Refills     metFORMIN (GLUCOPHAGE) 500 MG tablet [Pharmacy Med Name: METFORMIN HCL 500MG TABS] 360 tablet 1     Sig: TAKE TWO TABLETS BY MOUTH TWICE A DAY WITH MEALS    Biguanide Agents Failed    12/22/2017  4:01 PM       Failed - Patient's BP is less than 140/90    BP Readings from Last 3 Encounters:   06/12/17 132/82   03/09/17 132/84   02/23/17 130/74                Failed - Patient has documented LDL within the past 12 mos.    Recent Labs   Lab Test  12/16/16   1436   LDL  63            Failed - Patient has had a Microalbumin in the past 12 mos.    Recent Labs   Lab Test  12/16/16   1439   MICROL  7   UMALCR  5.76            Failed - Patient has documented A1c within the specified period of time.    Recent Labs   Lab Test  06/12/17   1637   A1C  5.8            Failed - Recent (6 mos) or future visit with authorizing provider's specialty    Patient had office visit in the last 6 months or has a visit in the next 30 days with authorizing provider.  See chart review.            Passed - Patient is age 10 or older       Passed - Patient's CR is NOT>1.4 OR Patient's EGFR is NOT<45 within past 12 mos.    Recent Labs   Lab Test  11/07/16   1651   GFRESTIMATED  >90  Non  GFR Calc     GFRESTBLACK  >90   GFR Calc         Recent Labs   Lab Test  11/07/16   1651   CR  0.80            Passed - Patient does NOT have a diagnosis of CHF.        metFORMIN (GLUCOPHAGE) 500 MG tablet  Routing refill request to provider for review/approval because:  Labs not current:  Fasting lipid panel, microalbumin, A1C  A break in medication, should have needed refills around 09/2017  Patient needs to be seen because:  Due for 6 month diabetic follow up    Please assist with scheduling.    Dannielle Harris, RN, BSN

## 2018-01-07 ENCOUNTER — APPOINTMENT (OUTPATIENT)
Dept: GENERAL RADIOLOGY | Facility: CLINIC | Age: 43
End: 2018-01-07
Attending: FAMILY MEDICINE
Payer: COMMERCIAL

## 2018-01-07 ENCOUNTER — HOSPITAL ENCOUNTER (EMERGENCY)
Facility: CLINIC | Age: 43
Discharge: HOME | End: 2018-01-07
Attending: FAMILY MEDICINE | Admitting: FAMILY MEDICINE
Payer: COMMERCIAL

## 2018-01-07 VITALS
DIASTOLIC BLOOD PRESSURE: 103 MMHG | TEMPERATURE: 98.2 F | HEART RATE: 80 BPM | RESPIRATION RATE: 16 BRPM | WEIGHT: 245 LBS | BODY MASS INDEX: 36.29 KG/M2 | OXYGEN SATURATION: 92 % | HEIGHT: 69 IN | SYSTOLIC BLOOD PRESSURE: 146 MMHG

## 2018-01-07 DIAGNOSIS — S29.9XXA INJURY OF CHEST WALL, INITIAL ENCOUNTER: ICD-10-CM

## 2018-01-07 PROCEDURE — 71101 X-RAY EXAM UNILAT RIBS/CHEST: CPT | Mod: TC,LT

## 2018-01-07 PROCEDURE — 99284 EMERGENCY DEPT VISIT MOD MDM: CPT | Mod: Z6 | Performed by: FAMILY MEDICINE

## 2018-01-07 PROCEDURE — 99283 EMERGENCY DEPT VISIT LOW MDM: CPT | Performed by: FAMILY MEDICINE

## 2018-01-07 PROCEDURE — 25000132 ZZH RX MED GY IP 250 OP 250 PS 637: Performed by: FAMILY MEDICINE

## 2018-01-07 RX ORDER — OXYCODONE AND ACETAMINOPHEN 5; 325 MG/1; MG/1
2 TABLET ORAL ONCE
Status: COMPLETED | OUTPATIENT
Start: 2018-01-07 | End: 2018-01-07

## 2018-01-07 RX ORDER — OXYCODONE AND ACETAMINOPHEN 5; 325 MG/1; MG/1
1-2 TABLET ORAL EVERY 4 HOURS PRN
Qty: 20 TABLET | Refills: 0 | Status: ON HOLD | OUTPATIENT
Start: 2018-01-07 | End: 2018-01-18

## 2018-01-07 RX ORDER — AZITHROMYCIN 250 MG/1
TABLET, FILM COATED ORAL
Qty: 6 TABLET | Refills: 0 | Status: SHIPPED | OUTPATIENT
Start: 2018-01-07 | End: 2018-01-13

## 2018-01-07 RX ADMIN — OXYCODONE HYDROCHLORIDE AND ACETAMINOPHEN 2 TABLET: 5; 325 TABLET ORAL at 10:55

## 2018-01-07 ASSESSMENT — ENCOUNTER SYMPTOMS
COLOR CHANGE: 0
RHINORRHEA: 1
PALPITATIONS: 0
SORE THROAT: 0
COUGH: 1
WHEEZING: 0
POLYDIPSIA: 0
WEAKNESS: 0
NAUSEA: 0
MYALGIAS: 1
DIZZINESS: 0
CHILLS: 0
VOMITING: 0
DIARRHEA: 0
FEVER: 0
WOUND: 0
NECK PAIN: 0
SINUS PRESSURE: 1
CONFUSION: 0
DYSURIA: 0
BACK PAIN: 1
HEMATURIA: 0
EYE REDNESS: 0
ABDOMINAL PAIN: 0
SHORTNESS OF BREATH: 0

## 2018-01-07 NOTE — ED NOTES
Pt states he was ice fishing and slipped hitting his left side of chest on hard plastic bucket.  He is having increasing difficulty taking deep breath, pain w/cough and movement.  Has to sit up tall for position of comfort.

## 2018-01-07 NOTE — ED PROVIDER NOTES
History     Chief Complaint   Patient presents with     Rib Pain     HPI  Estevan Callejas is a 42 year old male who presents to the emergency room with left lateral anterior chest wall pain.  The patient slipped on ice and landed on a bucket with it striking his left anterior lateral chest wall just below his left breast.  This occurred 2 days ago.  The pain has persisted.  He had cough and congestion before this happened.  The cough seems to be worse.  He denies any shortness of breath.  He is a smoker.  He has a history of rib fracture and feels the same sense of pain.  At times he feels a clicking sensation if he takes a deep breath or bends a particular way.  He has some pain in the back but most of it is anterior lateral.    Problem List:    Patient Active Problem List    Diagnosis Date Noted     Morbid obesity (H) 06/12/2017     Priority: Medium     Low back pain 03/06/2015     Priority: Medium     Type 2 diabetes, HbA1c goal < 7% (H) 02/25/2015     Priority: Medium     Peripheral neuropathy - near S2 dermatome 02/18/2015     Priority: Medium        Past Medical History:    History reviewed. No pertinent past medical history.    Past Surgical History:    History reviewed. No pertinent surgical history.    Family History:    No family history on file.    Social History:  Marital Status:  Single [1]  Social History   Substance Use Topics     Smoking status: Current Some Day Smoker     Types: Dip, chew, snus or snuff, Cigarettes     Smokeless tobacco: Current User     Alcohol use Yes        Medications:      oxyCODONE-acetaminophen (PERCOCET) 5-325 MG per tablet   azithromycin (ZITHROMAX Z-MINH) 250 MG tablet   metFORMIN (GLUCOPHAGE) 500 MG tablet   gabapentin (NEURONTIN) 300 MG capsule   lisinopril (PRINIVIL/ZESTRIL) 2.5 MG tablet   aspirin 81 MG tablet   blood glucose monitoring (NO BRAND SPECIFIED) test strip   blood glucose monitoring (NO BRAND SPECIFIED) meter device kit   blood glucose (NO BRAND SPECIFIED)  "lancets standard         Review of Systems   Constitutional: Negative for chills and fever.   HENT: Positive for congestion, rhinorrhea and sinus pressure. Negative for sore throat.    Eyes: Negative for redness.   Respiratory: Positive for cough. Negative for shortness of breath and wheezing.    Cardiovascular: Positive for chest pain. Negative for palpitations.   Gastrointestinal: Negative for abdominal pain, diarrhea, nausea and vomiting.   Endocrine: Negative for polydipsia and polyuria.   Genitourinary: Negative for dysuria and hematuria.   Musculoskeletal: Positive for back pain and myalgias. Negative for neck pain.   Skin: Negative for color change and wound.   Allergic/Immunologic: Negative for immunocompromised state.   Neurological: Negative for dizziness and weakness.   Psychiatric/Behavioral: Negative for confusion.       Physical Exam   BP: (!) 162/93  Pulse: 80  Temp: 98.2  F (36.8  C)  Resp: 16  Height: 175.3 cm (5' 9\")  Weight: 111.1 kg (245 lb)  SpO2: 92 %      Physical Exam   Constitutional: He is oriented to person, place, and time. He appears well-developed and well-nourished. No distress.   HENT:   Head: Normocephalic.   Right Ear: External ear normal.   Left Ear: External ear normal.   Nose: Nose normal.   Mouth/Throat: Oropharynx is clear and moist.   Eyes: Conjunctivae and EOM are normal. Pupils are equal, round, and reactive to light.   Neck: Normal range of motion. Neck supple.   Cardiovascular: Normal rate, regular rhythm and normal heart sounds.    Pulmonary/Chest: Effort normal and breath sounds normal.   Abdominal: Soft. Bowel sounds are normal.   Musculoskeletal: Normal range of motion.   Neurological: He is alert and oriented to person, place, and time.   Skin: Skin is warm and dry.   Psychiatric: He has a normal mood and affect. His behavior is normal.   Nursing note and vitals reviewed.      ED Course     ED Course     Results for orders placed or performed during the hospital " encounter of 01/07/18 (from the past 48 hour(s))   XR Ribs & Chest Lt 3v    Narrative    XR RIBS & CHEST LT 3VW 1/7/2018 11:06 AM    COMPARISON: None.    HISTORY: Fall, left anterior lower rib pain.      Impression    IMPRESSION: Mild atelectasis at the right base. Lungs otherwise clear.  No pleural effusion or pneumothorax. No rib fractures are seen.    KARON ZUNIGA MD       Procedures               Critical Care time:  none               Labs Ordered and Resulted from Time of ED Arrival Up to the Time of Departure from the ED - No data to display    Assessments & Plan (with Medical Decision Making)   MDM--42-year-old male who fell on ice/bucket 2 days ago and comes in with left anterior lateral chest wall pain.  He has pain with inspiration.  He has a cough but this preceded the injury.  He is certainly having a lot of pain with the cough.  He denies developing any fever chills or sweats.  He is a smoker.  He has left anterior lateral chest wall tenderness.  No crepitus.  No bruising.  No deformities.  Lung sounds are clear and equal without rhonchi.  X-ray report as above.  Area of injury is in the costochondral area it would not show up on an x-ray.  No evidence of any displaced rib.  Discussed rib fractures with the patient in a cast especially with his smoking he needs to take deep breaths and cough occasionally.  He is also had a respiratory illness for some time and I am concerned for bronchitis or pneumonia so we will start him on Zithromax.  For pain control he is given 2 Percocet here and sent home with a prescription.  He was also given a work note for the next 2 days.  Patient and wife are comfortable with this evaluation treatment and discharge plan and all their questions answered to their satisfaction and he is discharged in stable/improved condition.  I have reviewed the nursing notes.    I have reviewed the findings, diagnosis, plan and need for follow up with the patient.          New  Prescriptions    AZITHROMYCIN (ZITHROMAX Z-MINH) 250 MG TABLET    Take 2 pills today then 1 pill a day for 4 more days    OXYCODONE-ACETAMINOPHEN (PERCOCET) 5-325 MG PER TABLET    Take 1-2 tablets by mouth every 4 hours as needed       Final diagnoses:   Injury of chest wall, initial encounter       1/7/2018   [facility] EMERGENCY DEPARTMENT     Lucy, Ankur BAL MD  01/07/18 [id number]

## 2018-01-07 NOTE — LETTER
[facility] EMERGENCY DEPARTMENT  [address] [place] [address]  Phone: [phone]  Fax: [phone]    January 7, 2018        Estevan Callejas  [address] [address]          To whom it may concern:    RE: Estevan Callejas    Patient was seen and treated today at our emergency department.  He may need 2-3 days off work but can return as he improves.          Sincerely,        Ankur Ayala MD

## 2018-01-07 NOTE — ED AVS SNAPSHOT
[facility] Emergency Department    [address] [place] [address]    Phone:  [phone]    Fax:  [phone]                                       Estevan Callejas   MRN: [id number]    Department:  [facility] Emergency [facility]   Date of Visit:  1/7/2018           Patient Information     Date Of Birth          1975        Your diagnoses for this visit were:     Injury of chest wall, initial encounter        You were seen by Lucy, Ankur BAL MD.      Follow-up Information     Follow up with Lawanda Barrera MD.    Specialty:  Family Practice    Why:  As needed    Contact information:    [address]  [facility] [address]  [phone]          Follow up with [facility].    Specialty:  EMERGENCY MEDICINE    Why:  If symptoms worsen    Contact information:    [address] [place] [address] [phone]    Additional information:    From [address]: Exit at [address] on south side of [place]. Turn right on [address]. Turn left at stoplight on [address]. [facility] will be in view two blocks ahead      Discharge References/Attachments     RIB CONTUSION OR MINOR FRACTURE (ENGLISH)      24 Hour Appointment Hotline       To make an appointment at any [facility] clinic, call [phone] ([phone]). If you don't have a family doctor or clinic, we will help you find one. [facility] clinics are conveniently located to serve the needs of you and your family.             Review of your medicines      START taking        Dose / Directions Last dose taken    azithromycin 250 MG tablet   Commonly known as:  ZITHROMAX Z-MINH   Quantity:  6 tablet        Take 2 pills today then 1 pill a day for 4 more days   Refills:  0        oxyCODONE-acetaminophen 5-325 MG per tablet   Commonly known as:  PERCOCET   Dose:  1-2 tablet   Quantity:  20 tablet        Take 1-2 tablets by mouth every 4 hours as needed   Refills:  0           Our records show that you are taking the medicines listed below. If these are incorrect, please call your family doctor or clinic.        Dose / Directions Last dose taken    aspirin 81 MG tablet   Dose:  81 mg   Quantity:  90 tablet        Take 1 tablet (81 mg) by mouth daily   Refills:  3        blood glucose lancets standard   Commonly known as:  no brand specified   Quantity:  1 Box        Use to test blood sugar one times daily or as directed.   Refills:  0        blood glucose monitoring meter device kit   Commonly known as:  no brand specified   Quantity:  1 kit        Use to test blood sugar one times daily or as directed.   Refills:  0        blood glucose monitoring test strip   Commonly known as:  no brand specified   Quantity:  90 each        Use to test blood sugars  One  times daily in am   Refills:  3        gabapentin 300 MG capsule   Commonly known as:  NEURONTIN   Dose:  300 mg   Quantity:  270 capsule        Take 1 capsule (300 mg) by mouth 3 times daily   Refills:  1        lisinopril 2.5 MG tablet   Commonly known as:  PRINIVIL/Zestril   Dose:  2.5 mg   Quantity:  90 tablet        Take 1 tablet (2.5 mg) by mouth daily   Refills:  1        metFORMIN 500 MG tablet   Commonly known as:  GLUCOPHAGE   Quantity:  120 tablet        TAKE TWO TABLETS BY MOUTH TWICE A DAY WITH MEALS   Refills:  0                Prescriptions were sent or printed at these locations (2 Prescriptions)                   [facility] [place], [place] - [facility]    [address] , [facility] [address]    Telephone:  [phone]   Fax:  [phone]   Hours:                  Printed at Department/Unit printer (2 of 2)         oxyCODONE-acetaminophen (PERCOCET) 5-325 MG per tablet               azithromycin (ZITHROMAX Z-MINH) 250 MG tablet                Procedures and tests performed during your visit     XR Ribs & Chest Lt 3v      Orders Needing Specimen Collection     None      Pending Results      "No orders found from 2018 to 2018.            Pending Culture Results     No orders found from 2018 to 2018.            Pending Results Instructions     If you had any lab results that were not finalized at the time of your Discharge, you can call the ED Lab Result RN at [phone]. You will be contacted by this team for any positive Lab results or changes in treatment. The nurses are available 7 days a week from 10A to 6:30P.  You can leave a message 24 hours per day and they will return your call.        Thank you for choosing [facility]       Thank you for choosing [facility] for your care. Our goal is always to provide you with excellent care. Hearing back from our patients is one way we can continue to improve our services. Please take a few minutes to complete the written survey that you may receive in the mail after you visit with us. Thank you!        [facility] Information     [facility] lets you send messages to your doctor, view your test results, renew your prescriptions, schedule appointments and more. To sign up, go to [email or web address] . Click on \"Log in\" on the left side of the screen, which will take you to the Welcome page. Then click on \"Sign up Now\" on the right side of the page.     You will be asked to enter the access code listed below, as well as some personal information. Please follow the directions to create your username and password.     Your access code is: [email or web address]  Expires: 2018 11:42 AM     Your access code will  in 90 days. If you need help or a new code, please call your [facility] clinic or [phone].        Care EveryWhere ID     This is your Care EveryWhere ID. This could be used by other organizations to access your [facility] medical records  [id number]        Equal Access to Services     DAVID JORGE : yane Fonseca, elisabeth urena. So [facility] " [phone].    ATENCIÓN: Si habla español, tiene a parker disposición servicios gratuitos de asistencia lingüística. Llame al [phone].    We comply with applicable federal civil rights laws and [place] laws. We do not discriminate on the basis of race, color, national origin, age, disability, sex, sexual orientation, or gender identity.            After Visit Summary       This is your record. Keep this with you and show to your community pharmacist(s) and doctor(s) at your next visit.

## 2018-01-07 NOTE — ED AVS SNAPSHOT
[facility] Emergency Department    [address] [place] [address]    Phone:  [phone]    Fax:  [phone]                                       Estevan Callejas   MRN: [id number]    Department:  [facility] Emergency Department   Date of Visit:  1/7/2018           After Visit Summary Signature Page     I have received my discharge instructions, and my questions have been answered. I have discussed any challenges I see with this plan with the nurse or doctor.    ..........................................................................................................................................  Patient/Patient Representative Signature      ..........................................................................................................................................  Patient Representative Print Name and Relationship to Patient    ..................................................               ................................................  Date                                            Time    ..........................................................................................................................................  Reviewed by Signature/Title    ...................................................              ..............................................  Date                                                            Time

## 2018-01-09 ENCOUNTER — TELEPHONE (OUTPATIENT)
Dept: FAMILY MEDICINE | Facility: OTHER | Age: 43
End: 2018-01-09

## 2018-01-09 NOTE — TELEPHONE ENCOUNTER
Estevan Callejas is a 42 year old male who calls with constipation.    NURSING ASSESSMENT:  Description:  Pt states he has been on Vicodin for 3 days due to rib pain.  He is now concerned because he hasn't been having regular BM's.  He is also complaining of a bulge in his groin.  Onset/duration:  2 days   Precip. factors:  Taking Vicodin  Associated symptoms:  Small BM's daily, not urinating as often but urinating at least every 6-8 hours, small bulge between anus and testicle.  Denies abdominal pain, signs of dehydration, vomiting, fever, difficulty urinating, hematuria.  Improves/worsens symptoms:  Has been drinking lots of water but it doesn't seem to help with his small stools  Pain scale (0-10)   0/10    Allergies: No Known Allergies      RECOMMENDED DISPOSITION:  Home care advice for constipation.  Activity, water, fruit, vegetables, Miralax or Citrucel.  Be seen for bulge in groin-pt refused to make an appt.  He said he will call tomorrow if it is still there.  Will comply with recommendation: Yes  If further questions/concerns or if symptoms do not improve, worsen or new symptoms develop, call your PCP or [facility] as soon as possible.      Guideline used: constipation  Telephone Triage Protocols for Nurses, Fifth Edition, Brandy Holland RN

## 2018-01-10 ENCOUNTER — TELEPHONE (OUTPATIENT)
Dept: FAMILY MEDICINE | Facility: OTHER | Age: 43
End: 2018-01-10

## 2018-01-10 ENCOUNTER — OFFICE VISIT (OUTPATIENT)
Dept: FAMILY MEDICINE | Facility: CLINIC | Age: 43
End: 2018-01-10
Payer: COMMERCIAL

## 2018-01-10 VITALS
HEIGHT: 69 IN | WEIGHT: 249.1 LBS | BODY MASS INDEX: 36.89 KG/M2 | RESPIRATION RATE: 20 BRPM | SYSTOLIC BLOOD PRESSURE: 138 MMHG | DIASTOLIC BLOOD PRESSURE: 82 MMHG | TEMPERATURE: 99.5 F | HEART RATE: 98 BPM

## 2018-01-10 DIAGNOSIS — R10.2 PERINEAL PAIN IN MALE: Primary | ICD-10-CM

## 2018-01-10 DIAGNOSIS — K59.03 DRUG-INDUCED CONSTIPATION: ICD-10-CM

## 2018-01-10 PROCEDURE — 99213 OFFICE O/P EST LOW 20 MIN: CPT | Performed by: FAMILY MEDICINE

## 2018-01-10 ASSESSMENT — PAIN SCALES - GENERAL: PAINLEVEL: SEVERE PAIN (7)

## 2018-01-10 NOTE — PATIENT INSTRUCTIONS
Take warm baths and Tylenol as needed.     Call back for recheck if symptoms worsen or fail to improve.

## 2018-01-10 NOTE — LETTER
January 10, 2018      Estevan Callejas  [address] [place] [address]        To Whom It May Concern,          Mr. Callejas was seen in the office today.           Sincerely,            Missy Milton MD

## 2018-01-10 NOTE — PROGRESS NOTES
SUBJECTIVE:                                                    Estevan Callejas is a 42 year old male who presents to clinic today for the following health issues:      HPI    Constipation     Onset: 2 days    Description:   Frequency of bowel movements: Off and on.  Stool consistency: soft    Progression of Symptoms:  worsening    Accompanying Signs & Symptoms:  Abdominal pain (cramping?): no   Blood in stool: no   Rectal pain: YES  Nausea/vomiting: no   Weight loss or gain: no    History:   History of abdominal surgery: no     Precipitating factors:   Recent use of narcotics, anticholinergics, calcium channel blockers, antacids, or iron supplements: YES- Percocet  Chronic Laxative Use: no          Therapies Tried and outcome: prune juice      Therapies Tried and outcome:  None; Outcome:     Rectal issue  Patient reports for constipation that started 2 days ago. Patient feels a bulge near his rectum. Patient was constipated for 4 days from his pain medication from hurting his ribs. He bruised his ribs as well. He woke up at 4:30am yesterday, he tried to sit down, but it was tender to sit down. He had a bowel movement after lunch, and it was soft. He has noticed blood when he wipes. The bulge does not feel as big now as it did at first. He denies having a fever or chills. He tried to take Prune juice and coffee. No issues with his bowel movements at this time. He is not taking any narcotics for pain any longer.     Problem list and histories reviewed & adjusted, as indicated.  Additional history: as documented    BP Readings from Last 3 Encounters:   01/10/18 138/82   01/07/18 (!) 146/103   06/12/17 132/82    Wt Readings from Last 3 Encounters:   01/10/18 113 kg (249 lb 1.6 oz)   01/07/18 111.1 kg (245 lb)   06/12/17 112.9 kg (249 lb)        ROS:  C: NEGATIVE for fever, chills, change in weight. See HPI above.   E/M: NEGATIVE for ear, mouth and throat problems  R: NEGATIVE for significant cough or SOB  CV: NEGATIVE  "for chest pain, palpitations or peripheral edema  GI: NEGATIVE for nausea, abdominal pain, and heartburn. POSITIVE for change in bowel habits. See HPI above.   MUSCULOSKELETAL: NEGATIVE for significant arthralgias or myalgia. See HPI above.     This document serves as a record of the services and decisions personally performed and made by Missy Milton MD. It was created on her behalf by Brea Campbell, a trained medical scribe. The creation of this document is based on the provider's statements to the medical scribe.  Brea Campbell 2:25 PM January 10, 2018    OBJECTIVE:     /82  Pulse 98  Temp 99.5  F (37.5  C) (Temporal)  Resp 20  Ht 1.765 m (5' 9.49\")  Wt 113 kg (249 lb 1.6 oz)  BMI 36.27 kg/m2  Body mass index is 36.27 kg/(m^2).  GENERAL APPEARANCE: healthy, alert and no distress  ABDOMEN: soft, nontender, without hepatosplenomegaly or masses and bowel sounds normal  PERINEUM: tenderness between rectum and scrotum but unable to find any lumps, no redness or warmth, rest of exam no masses or tenderness  RECTAL: no masses noted. No pain.     Diagnostic Test Results:  none     ASSESSMENT/PLAN:         1. Perineal pain in male  Evaluated constipation symptoms which are now resolved.  The perineum has some mild tenderness between the rectum and the scrotum.   There is no fluctuance, redness or increase in warmth.  Can find no evidence of an abscess or other infection.   Likely related to the \"trauma\" of the constipation and straining   Will treat with warm baths and Tylenol as needed. Advised Patient if it worsens or fails to resolve over the next week to call back for recheck, but likely will resolve.    2. Drug-induced constipation  See above. Now resolved.     Follow Up: Return to clinic if symptoms worsen or fail to improve.     All questions invited, asked and answered to the patient's apparent satisfaction.  Patient agrees to plan.     The information in this document, created by the medical scribe " for me, accurately reflects the services I personally performed and the decisions made by me. I have reviewed and approved this document for accuracy prior to leaving the patient care area.  January 10, 2018 2:37 PM    BURT BALDERRAMA MD, MD  [facility]

## 2018-01-10 NOTE — TELEPHONE ENCOUNTER
No available appointments in ER - [facility] has a same day appointment. Are we able to use this spot?    If not, there are available appointments at [facility].  Vangie Echeverria CMA

## 2018-01-10 NOTE — MR AVS SNAPSHOT
"              After Visit Summary   1/10/2018    Estevan Callejas    MRN: [id number]           Patient Information     Date Of Birth          1975        Visit Information        Provider Department      1/10/2018 2:00 PM Missy Milton MD [facility]         Follow-ups after your visit        Who to contact     If you have questions or need follow up information about today's clinic visit or your schedule please [facility] at [phone].  Normal or non-critical lab and imaging results will be communicated to you by MyChart, letter or phone within 4 business days after the clinic has received the results. If you do not hear from us within 7 days, please contact the clinic through [facility] or phone. If you have a critical or abnormal lab result, we will notify you by phone as soon as possible.  Submit refill requests through [facility] or call your pharmacy and they will forward the refill request to us. Please allow 3 business days for your refill to be completed.          Additional Information About Your Visit        [facility] Information     [facility] lets you send messages to your doctor, view your test results, renew your prescriptions, schedule appointments and more. To sign up, go to [email or web address] . Click on \"Log in\" on the left side of the screen, which will take you to the Welcome page. Then click on \"Sign up Now\" on the right side of the page.     You will be asked to enter the access code listed below, as well as some personal information. Please follow the directions to create your username and password.     Your access code is: [email or web address]  Expires: 2018 11:42 AM     Your access code will  in 90 days. If you need help or a new code, please call your [facility] or [phone].        Care EveryWhere ID     This is your Care EveryWhere ID. This could be used by other organizations to access your [facility] medical records  [id number]   " "     Your Vitals Were     Pulse Temperature Respirations Height BMI (Body Mass Index)       98 99.5  F (37.5  C) (Temporal) 20 5' 9.49\" (1.765 m) 36.27 kg/m2        Blood Pressure from Last 3 Encounters:   01/10/18 138/82   01/07/18 (!) 146/103   06/12/17 132/82    Weight from Last 3 Encounters:   01/10/18 249 lb 1.6 oz (113 kg)   01/07/18 245 lb (111.1 kg)   06/12/17 249 lb (112.9 kg)              Today, you had the following     No orders found for display       Primary Care Provider Office Phone # Fax #    Lawanda Barrera [phone]       [address] [address]        Equal Access to Services     [facility] Center[facility]IS ANGEL : Alanna reid Sotrang, waaxkrysta luqadaha, qaybmaria r kaalmakrysta fernández, elisabeth isabel . So [facility] [phone].    ATENCIÓN: Si habla español, tiene a parker disposición servicios gratuitos de asistencia lingüística. [facility] [phone].    We comply with applicable federal civil rights laws and [place] laws. We do not discriminate on the basis of race, color, national origin, age, disability, sex, sexual orientation, or gender identity.            Thank you!     Thank you for choosing [facility]  for your care. Our goal is always to provide you with excellent care. Hearing back from our patients is one way we can continue to improve our services. Please take a few minutes to complete the written survey that you may receive in the mail after your visit with us. Thank you!             Your Updated Medication List - Protect others around you: Learn how to safely use, store and throw away your medicines at www.disposemymeds.org.          This list is accurate as of: 1/10/18  2:36 PM.  Always use your most recent med list.                   Brand Name Dispense Instructions for use Diagnosis    aspirin 81 MG tablet     90 tablet    Take 1 tablet (81 mg) by mouth daily    Type 2 diabetes mellitus without complication, without " long-term current use of insulin (H)       azithromycin 250 MG tablet    ZITHROMAX Z-MINH    6 tablet    Take 2 pills today then 1 pill a day for 4 more days        blood glucose lancets standard    no brand specified    1 Box    Use to test blood sugar one times daily or as directed.    Type 2 diabetes mellitus with complication, without long-term current use of insulin (H)       blood glucose monitoring meter device kit    no brand specified    1 kit    Use to test blood sugar one times daily or as directed.    Type 2 diabetes mellitus with complication, without long-term current use of insulin (H)       blood glucose monitoring test strip    no brand specified    90 each    Use to test blood sugars  One  times daily in am    Type 2 diabetes mellitus with complication, without long-term current use of insulin (H)       gabapentin 300 MG capsule    NEURONTIN    270 capsule    Take 1 capsule (300 mg) by mouth 3 times daily    Type 2 diabetes mellitus without complication, without long-term current use of insulin (H)       lisinopril 2.5 MG tablet    PRINIVIL/Zestril    90 tablet    Take 1 tablet (2.5 mg) by mouth daily    Type 2 diabetes mellitus without complication, without long-term current use of insulin (H)       metFORMIN 500 MG tablet    GLUCOPHAGE    120 tablet    TAKE TWO TABLETS BY MOUTH TWICE A DAY WITH MEALS    Type 2 diabetes mellitus with complication, without long-term current use of insulin (H)       oxyCODONE-acetaminophen 5-325 MG per tablet    PERCOCET    20 tablet    Take 1-2 tablets by mouth every 4 hours as needed

## 2018-01-10 NOTE — TELEPHONE ENCOUNTER
Reason for call:  Same Day Appointment  Reason for Call:  Same Day Appointment, Requested Provider:  any provider in er,zm, pn     PCP: Lawanda Barrera    Reason for visit: constipation    Duration of symptoms: yesterday    Have you been treated for this in the past? NO    Additional comments: pt had spoke with the triage nurse yesterday in regards to this, but he would like to see if he can get in after 2pm today because he still has a lot of pain when he sits down and he still feels the lump. He is willing to come in sooner if he is given enough time because he is coming from [place].     Can we leave a detailed message on this number? YES    Phone number patient can be reached at: Home number on file [phone] (home)    Best Time: anytime    Call taken on 1/10/2018 at 8:01 AM by Pat Moody

## 2018-01-13 ENCOUNTER — ANESTHESIA (OUTPATIENT)
Dept: SURGERY | Facility: CLINIC | Age: 43
End: 2018-01-13
Payer: COMMERCIAL

## 2018-01-13 ENCOUNTER — ANESTHESIA EVENT (OUTPATIENT)
Dept: SURGERY | Facility: CLINIC | Age: 43
End: 2018-01-13
Payer: COMMERCIAL

## 2018-01-13 ENCOUNTER — APPOINTMENT (OUTPATIENT)
Dept: CT IMAGING | Facility: CLINIC | Age: 43
End: 2018-01-13
Attending: FAMILY MEDICINE
Payer: COMMERCIAL

## 2018-01-13 ENCOUNTER — HOSPITAL ENCOUNTER (INPATIENT)
Facility: CLINIC | Age: 43
LOS: 5 days | Discharge: HOME HEALTH | End: 2018-01-18
Attending: EMERGENCY MEDICINE | Admitting: SURGERY
Payer: COMMERCIAL

## 2018-01-13 ENCOUNTER — HOSPITAL ENCOUNTER (EMERGENCY)
Facility: CLINIC | Age: 43
Discharge: ANOTHER HOSPITAL | End: 2018-01-13
Attending: FAMILY MEDICINE | Admitting: FAMILY MEDICINE
Payer: COMMERCIAL

## 2018-01-13 VITALS
SYSTOLIC BLOOD PRESSURE: 129 MMHG | TEMPERATURE: 101.7 F | DIASTOLIC BLOOD PRESSURE: 72 MMHG | OXYGEN SATURATION: 95 % | WEIGHT: 250 LBS | BODY MASS INDEX: 36.4 KG/M2 | RESPIRATION RATE: 22 BRPM | HEART RATE: 91 BPM

## 2018-01-13 DIAGNOSIS — L02.91 ABSCESS: ICD-10-CM

## 2018-01-13 DIAGNOSIS — N49.3 FOURNIER'S GANGRENE IN MALE (H): ICD-10-CM

## 2018-01-13 DIAGNOSIS — L02.215 ABSCESS OF PERINEUM: ICD-10-CM

## 2018-01-13 DIAGNOSIS — K61.1 PERIRECTAL ABSCESS: ICD-10-CM

## 2018-01-13 DIAGNOSIS — N50.82 ACUTE PAIN IN SCROTUM: ICD-10-CM

## 2018-01-13 DIAGNOSIS — R50.9 FEVER, UNSPECIFIED FEVER CAUSE: ICD-10-CM

## 2018-01-13 DIAGNOSIS — K59.03 DRUG-INDUCED CONSTIPATION: Primary | ICD-10-CM

## 2018-01-13 LAB
ABO + RH BLD: NORMAL
ABO + RH BLD: NORMAL
ALBUMIN SERPL-MCNC: 2.2 G/DL (ref 3.4–5)
ALP SERPL-CCNC: 89 U/L (ref 40–150)
ALT SERPL W P-5'-P-CCNC: 107 U/L (ref 0–70)
ANION GAP SERPL CALCULATED.3IONS-SCNC: 9 MMOL/L (ref 3–14)
AST SERPL W P-5'-P-CCNC: 59 U/L (ref 0–45)
BASOPHILS # BLD AUTO: 0 10E9/L (ref 0–0.2)
BASOPHILS NFR BLD AUTO: 0.1 %
BILIRUB SERPL-MCNC: 1 MG/DL (ref 0.2–1.3)
BLD GP AB SCN SERPL QL: NORMAL
BLOOD BANK CMNT PATIENT-IMP: NORMAL
BUN SERPL-MCNC: 8 MG/DL (ref 7–30)
CALCIUM SERPL-MCNC: 8 MG/DL (ref 8.5–10.1)
CHLORIDE SERPL-SCNC: 97 MMOL/L (ref 94–109)
CO2 SERPL-SCNC: 25 MMOL/L (ref 20–32)
CREAT SERPL-MCNC: 0.71 MG/DL (ref 0.66–1.25)
CRP SERPL-MCNC: 190 MG/L (ref 0–8)
DIFFERENTIAL METHOD BLD: ABNORMAL
EOSINOPHIL # BLD AUTO: 0 10E9/L (ref 0–0.7)
EOSINOPHIL NFR BLD AUTO: 0 %
ERYTHROCYTE [DISTWIDTH] IN BLOOD BY AUTOMATED COUNT: 12 % (ref 10–15)
GFR SERPL CREATININE-BSD FRML MDRD: >90 ML/MIN/1.7M2
GLUCOSE BLDC GLUCOMTR-MCNC: 136 MG/DL (ref 70–99)
GLUCOSE BLDC GLUCOMTR-MCNC: 144 MG/DL (ref 70–99)
GLUCOSE SERPL-MCNC: 207 MG/DL (ref 70–99)
HCT VFR BLD AUTO: 38.4 % (ref 40–53)
HGB BLD-MCNC: 12.9 G/DL (ref 13.3–17.7)
IMM GRANULOCYTES # BLD: 0.1 10E9/L (ref 0–0.4)
IMM GRANULOCYTES NFR BLD: 0.3 %
INR PPP: 1.26 (ref 0.86–1.14)
LACTATE BLD-SCNC: 1.7 MMOL/L (ref 0.7–2)
LYMPHOCYTES # BLD AUTO: 0.6 10E9/L (ref 0.8–5.3)
LYMPHOCYTES NFR BLD AUTO: 3.6 %
MCH RBC QN AUTO: 31.3 PG (ref 26.5–33)
MCHC RBC AUTO-ENTMCNC: 33.6 G/DL (ref 31.5–36.5)
MCV RBC AUTO: 93 FL (ref 78–100)
MONOCYTES # BLD AUTO: 2.7 10E9/L (ref 0–1.3)
MONOCYTES NFR BLD AUTO: 14.9 %
NEUTROPHILS # BLD AUTO: 14.4 10E9/L (ref 1.6–8.3)
NEUTROPHILS NFR BLD AUTO: 81.1 %
PLATELET # BLD AUTO: 163 10E9/L (ref 150–450)
POTASSIUM SERPL-SCNC: 3.7 MMOL/L (ref 3.4–5.3)
PROT SERPL-MCNC: 6.5 G/DL (ref 6.8–8.8)
RBC # BLD AUTO: 4.12 10E12/L (ref 4.4–5.9)
SODIUM SERPL-SCNC: 131 MMOL/L (ref 133–144)
SPECIMEN EXP DATE BLD: NORMAL
WBC # BLD AUTO: 17.8 10E9/L (ref 4–11)

## 2018-01-13 PROCEDURE — 87076 CULTURE ANAEROBE IDENT EACH: CPT | Performed by: SURGERY

## 2018-01-13 PROCEDURE — 87075 CULTR BACTERIA EXCEPT BLOOD: CPT | Performed by: SURGERY

## 2018-01-13 PROCEDURE — 25000125 ZZHC RX 250: Performed by: FAMILY MEDICINE

## 2018-01-13 PROCEDURE — 40000170 ZZH STATISTIC PRE-PROCEDURE ASSESSMENT II: Performed by: SURGERY

## 2018-01-13 PROCEDURE — 99285 EMERGENCY DEPT VISIT HI MDM: CPT | Mod: Z6 | Performed by: FAMILY MEDICINE

## 2018-01-13 PROCEDURE — 85610 PROTHROMBIN TIME: CPT | Performed by: EMERGENCY MEDICINE

## 2018-01-13 PROCEDURE — 71000014 ZZH RECOVERY PHASE 1 LEVEL 2 FIRST HR: Performed by: SURGERY

## 2018-01-13 PROCEDURE — 25000132 ZZH RX MED GY IP 250 OP 250 PS 637: Performed by: STUDENT IN AN ORGANIZED HEALTH CARE EDUCATION/TRAINING PROGRAM

## 2018-01-13 PROCEDURE — 25000125 ZZHC RX 250: Performed by: NURSE ANESTHETIST, CERTIFIED REGISTERED

## 2018-01-13 PROCEDURE — 0J9B0ZZ DRAINAGE OF PERINEUM SUBCUTANEOUS TISSUE AND FASCIA, OPEN APPROACH: ICD-10-PCS | Performed by: SURGERY

## 2018-01-13 PROCEDURE — 25000128 H RX IP 250 OP 636: Performed by: FAMILY MEDICINE

## 2018-01-13 PROCEDURE — 25800025 ZZH RX 258: Performed by: SURGERY

## 2018-01-13 PROCEDURE — 86850 RBC ANTIBODY SCREEN: CPT | Performed by: EMERGENCY MEDICINE

## 2018-01-13 PROCEDURE — 87070 CULTURE OTHR SPECIMN AEROBIC: CPT | Performed by: SURGERY

## 2018-01-13 PROCEDURE — 74177 CT ABD & PELVIS W/CONTRAST: CPT

## 2018-01-13 PROCEDURE — 83605 ASSAY OF LACTIC ACID: CPT | Performed by: FAMILY MEDICINE

## 2018-01-13 PROCEDURE — 86901 BLOOD TYPING SEROLOGIC RH(D): CPT | Performed by: EMERGENCY MEDICINE

## 2018-01-13 PROCEDURE — 96375 TX/PRO/DX INJ NEW DRUG ADDON: CPT | Performed by: FAMILY MEDICINE

## 2018-01-13 PROCEDURE — 86900 BLOOD TYPING SEROLOGIC ABO: CPT | Performed by: EMERGENCY MEDICINE

## 2018-01-13 PROCEDURE — 85025 COMPLETE CBC W/AUTO DIFF WBC: CPT | Performed by: FAMILY MEDICINE

## 2018-01-13 PROCEDURE — 87102 FUNGUS ISOLATION CULTURE: CPT | Performed by: SURGERY

## 2018-01-13 PROCEDURE — 25000128 H RX IP 250 OP 636: Performed by: STUDENT IN AN ORGANIZED HEALTH CARE EDUCATION/TRAINING PROGRAM

## 2018-01-13 PROCEDURE — 36000053 ZZH SURGERY LEVEL 2 EA 15 ADDTL MIN - UMMC: Performed by: SURGERY

## 2018-01-13 PROCEDURE — 87077 CULTURE AEROBIC IDENTIFY: CPT | Performed by: SURGERY

## 2018-01-13 PROCEDURE — 96367 TX/PROPH/DG ADDL SEQ IV INF: CPT | Performed by: FAMILY MEDICINE

## 2018-01-13 PROCEDURE — 96365 THER/PROPH/DIAG IV INF INIT: CPT | Performed by: FAMILY MEDICINE

## 2018-01-13 PROCEDURE — 00000146 ZZHCL STATISTIC GLUCOSE BY METER IP

## 2018-01-13 PROCEDURE — 86140 C-REACTIVE PROTEIN: CPT | Performed by: FAMILY MEDICINE

## 2018-01-13 PROCEDURE — 87040 BLOOD CULTURE FOR BACTERIA: CPT | Performed by: FAMILY MEDICINE

## 2018-01-13 PROCEDURE — 12000001 ZZH R&B MED SURG/OB UMMC

## 2018-01-13 PROCEDURE — 0TJDXZZ INSPECTION OF URETHRA, EXTERNAL APPROACH: ICD-10-PCS | Performed by: UROLOGY

## 2018-01-13 PROCEDURE — 87186 SC STD MICRODIL/AGAR DIL: CPT | Performed by: SURGERY

## 2018-01-13 PROCEDURE — 27210794 ZZH OR GENERAL SUPPLY STERILE: Performed by: SURGERY

## 2018-01-13 PROCEDURE — 96361 HYDRATE IV INFUSION ADD-ON: CPT | Performed by: FAMILY MEDICINE

## 2018-01-13 PROCEDURE — 99285 EMERGENCY DEPT VISIT HI MDM: CPT | Mod: 25 | Performed by: EMERGENCY MEDICINE

## 2018-01-13 PROCEDURE — 25000128 H RX IP 250 OP 636: Performed by: NURSE ANESTHETIST, CERTIFIED REGISTERED

## 2018-01-13 PROCEDURE — 25000128 H RX IP 250 OP 636: Performed by: ANESTHESIOLOGY

## 2018-01-13 PROCEDURE — 0JBB0ZZ EXCISION OF PERINEUM SUBCUTANEOUS TISSUE AND FASCIA, OPEN APPROACH: ICD-10-PCS | Performed by: SURGERY

## 2018-01-13 PROCEDURE — 37000009 ZZH ANESTHESIA TECHNICAL FEE, EACH ADDTL 15 MIN: Performed by: SURGERY

## 2018-01-13 PROCEDURE — 36000051 ZZH SURGERY LEVEL 2 1ST 30 MIN - UMMC: Performed by: SURGERY

## 2018-01-13 PROCEDURE — 25000125 ZZHC RX 250: Performed by: STUDENT IN AN ORGANIZED HEALTH CARE EDUCATION/TRAINING PROGRAM

## 2018-01-13 PROCEDURE — 37000008 ZZH ANESTHESIA TECHNICAL FEE, 1ST 30 MIN: Performed by: SURGERY

## 2018-01-13 PROCEDURE — 80053 COMPREHEN METABOLIC PANEL: CPT | Performed by: FAMILY MEDICINE

## 2018-01-13 PROCEDURE — 99285 EMERGENCY DEPT VISIT HI MDM: CPT | Mod: 25 | Performed by: FAMILY MEDICINE

## 2018-01-13 PROCEDURE — C9399 UNCLASSIFIED DRUGS OR BIOLOG: HCPCS | Performed by: NURSE ANESTHETIST, CERTIFIED REGISTERED

## 2018-01-13 PROCEDURE — 25000566 ZZH SEVOFLURANE, EA 15 MIN: Performed by: SURGERY

## 2018-01-13 RX ORDER — PROCHLORPERAZINE MALEATE 5 MG
10 TABLET ORAL EVERY 6 HOURS PRN
Status: DISCONTINUED | OUTPATIENT
Start: 2018-01-13 | End: 2018-01-18 | Stop reason: HOSPADM

## 2018-01-13 RX ORDER — PROPOFOL 10 MG/ML
INJECTION, EMULSION INTRAVENOUS PRN
Status: DISCONTINUED | OUTPATIENT
Start: 2018-01-13 | End: 2018-01-13

## 2018-01-13 RX ORDER — SODIUM CHLORIDE 9 MG/ML
INJECTION, SOLUTION INTRAVENOUS CONTINUOUS PRN
Status: DISCONTINUED | OUTPATIENT
Start: 2018-01-13 | End: 2018-01-13

## 2018-01-13 RX ORDER — LORAZEPAM 2 MG/ML
0.5 INJECTION INTRAMUSCULAR ONCE
Status: COMPLETED | OUTPATIENT
Start: 2018-01-13 | End: 2018-01-13

## 2018-01-13 RX ORDER — IOPAMIDOL 755 MG/ML
100 INJECTION, SOLUTION INTRAVASCULAR ONCE
Status: COMPLETED | OUTPATIENT
Start: 2018-01-13 | End: 2018-01-13

## 2018-01-13 RX ORDER — HYDROMORPHONE HYDROCHLORIDE 1 MG/ML
.3-.5 INJECTION, SOLUTION INTRAMUSCULAR; INTRAVENOUS; SUBCUTANEOUS
Status: DISCONTINUED | OUTPATIENT
Start: 2018-01-13 | End: 2018-01-13 | Stop reason: HOSPADM

## 2018-01-13 RX ORDER — CLINDAMYCIN PHOSPHATE 900 MG/50ML
900 INJECTION, SOLUTION INTRAVENOUS EVERY 8 HOURS
Status: DISCONTINUED | OUTPATIENT
Start: 2018-01-13 | End: 2018-01-14

## 2018-01-13 RX ORDER — FENTANYL CITRATE 50 UG/ML
INJECTION, SOLUTION INTRAMUSCULAR; INTRAVENOUS PRN
Status: DISCONTINUED | OUTPATIENT
Start: 2018-01-13 | End: 2018-01-13

## 2018-01-13 RX ORDER — LABETALOL HYDROCHLORIDE 5 MG/ML
5 INJECTION, SOLUTION INTRAVENOUS EVERY 10 MIN PRN
Status: DISCONTINUED | OUTPATIENT
Start: 2018-01-13 | End: 2018-01-13 | Stop reason: HOSPADM

## 2018-01-13 RX ORDER — POLYETHYLENE GLYCOL 3350 17 G/17G
17 POWDER, FOR SOLUTION ORAL DAILY PRN
Status: DISCONTINUED | OUTPATIENT
Start: 2018-01-13 | End: 2018-01-18 | Stop reason: HOSPADM

## 2018-01-13 RX ORDER — SODIUM CHLORIDE, SODIUM LACTATE, POTASSIUM CHLORIDE, CALCIUM CHLORIDE 600; 310; 30; 20 MG/100ML; MG/100ML; MG/100ML; MG/100ML
INJECTION, SOLUTION INTRAVENOUS CONTINUOUS
Status: DISCONTINUED | OUTPATIENT
Start: 2018-01-13 | End: 2018-01-14

## 2018-01-13 RX ORDER — LIDOCAINE HYDROCHLORIDE 20 MG/ML
INJECTION, SOLUTION INFILTRATION; PERINEURAL PRN
Status: DISCONTINUED | OUTPATIENT
Start: 2018-01-13 | End: 2018-01-13

## 2018-01-13 RX ORDER — HYDRALAZINE HYDROCHLORIDE 20 MG/ML
2.5-5 INJECTION INTRAMUSCULAR; INTRAVENOUS EVERY 10 MIN PRN
Status: DISCONTINUED | OUTPATIENT
Start: 2018-01-13 | End: 2018-01-13 | Stop reason: HOSPADM

## 2018-01-13 RX ORDER — ONDANSETRON 4 MG/1
4 TABLET, ORALLY DISINTEGRATING ORAL EVERY 30 MIN PRN
Status: DISCONTINUED | OUTPATIENT
Start: 2018-01-13 | End: 2018-01-13 | Stop reason: HOSPADM

## 2018-01-13 RX ORDER — NALOXONE HYDROCHLORIDE 0.4 MG/ML
.1-.4 INJECTION, SOLUTION INTRAMUSCULAR; INTRAVENOUS; SUBCUTANEOUS
Status: ACTIVE | OUTPATIENT
Start: 2018-01-13 | End: 2018-01-14

## 2018-01-13 RX ORDER — FENTANYL CITRATE 50 UG/ML
25-50 INJECTION, SOLUTION INTRAMUSCULAR; INTRAVENOUS EVERY 5 MIN PRN
Status: DISCONTINUED | OUTPATIENT
Start: 2018-01-13 | End: 2018-01-13 | Stop reason: HOSPADM

## 2018-01-13 RX ORDER — ACETAMINOPHEN 325 MG/1
975 TABLET ORAL EVERY 8 HOURS
Status: DISPENSED | OUTPATIENT
Start: 2018-01-13 | End: 2018-01-16

## 2018-01-13 RX ORDER — OXYCODONE HYDROCHLORIDE 5 MG/1
5-10 TABLET ORAL
Status: DISCONTINUED | OUTPATIENT
Start: 2018-01-13 | End: 2018-01-18 | Stop reason: HOSPADM

## 2018-01-13 RX ORDER — HYDROMORPHONE HYDROCHLORIDE 1 MG/ML
0.5 INJECTION, SOLUTION INTRAMUSCULAR; INTRAVENOUS; SUBCUTANEOUS
Status: DISCONTINUED | OUTPATIENT
Start: 2018-01-13 | End: 2018-01-13 | Stop reason: HOSPADM

## 2018-01-13 RX ORDER — LIDOCAINE 40 MG/G
CREAM TOPICAL
Status: DISCONTINUED | OUTPATIENT
Start: 2018-01-13 | End: 2018-01-18 | Stop reason: HOSPADM

## 2018-01-13 RX ORDER — SODIUM CHLORIDE 9 MG/ML
1000 INJECTION, SOLUTION INTRAVENOUS CONTINUOUS
Status: DISCONTINUED | OUTPATIENT
Start: 2018-01-13 | End: 2018-01-13 | Stop reason: HOSPADM

## 2018-01-13 RX ORDER — ONDANSETRON 2 MG/ML
4 INJECTION INTRAMUSCULAR; INTRAVENOUS EVERY 6 HOURS PRN
Status: DISCONTINUED | OUTPATIENT
Start: 2018-01-13 | End: 2018-01-18 | Stop reason: HOSPADM

## 2018-01-13 RX ORDER — NALOXONE HYDROCHLORIDE 0.4 MG/ML
.1-.4 INJECTION, SOLUTION INTRAMUSCULAR; INTRAVENOUS; SUBCUTANEOUS
Status: DISCONTINUED | OUTPATIENT
Start: 2018-01-13 | End: 2018-01-13

## 2018-01-13 RX ORDER — ASPIRIN 81 MG/1
81 TABLET, CHEWABLE ORAL DAILY
Status: DISCONTINUED | OUTPATIENT
Start: 2018-01-13 | End: 2018-01-18 | Stop reason: HOSPADM

## 2018-01-13 RX ORDER — CLINDAMYCIN PHOSPHATE 150 MG/ML
900 INJECTION, SOLUTION INTRAVENOUS ONCE
Status: DISCONTINUED | OUTPATIENT
Start: 2018-01-13 | End: 2018-01-13

## 2018-01-13 RX ORDER — ERTAPENEM 1 G/1
1 INJECTION, POWDER, LYOPHILIZED, FOR SOLUTION INTRAMUSCULAR; INTRAVENOUS ONCE
Status: COMPLETED | OUTPATIENT
Start: 2018-01-13 | End: 2018-01-13

## 2018-01-13 RX ORDER — AMOXICILLIN 250 MG
1-2 CAPSULE ORAL 2 TIMES DAILY
Status: DISCONTINUED | OUTPATIENT
Start: 2018-01-13 | End: 2018-01-18 | Stop reason: HOSPADM

## 2018-01-13 RX ORDER — ONDANSETRON 2 MG/ML
4 INJECTION INTRAMUSCULAR; INTRAVENOUS EVERY 30 MIN PRN
Status: DISCONTINUED | OUTPATIENT
Start: 2018-01-13 | End: 2018-01-13 | Stop reason: HOSPADM

## 2018-01-13 RX ORDER — GABAPENTIN 300 MG/1
300 CAPSULE ORAL 3 TIMES DAILY
Status: DISCONTINUED | OUTPATIENT
Start: 2018-01-13 | End: 2018-01-18 | Stop reason: HOSPADM

## 2018-01-13 RX ORDER — HYDROMORPHONE HYDROCHLORIDE 1 MG/ML
.3-.5 INJECTION, SOLUTION INTRAMUSCULAR; INTRAVENOUS; SUBCUTANEOUS
Status: DISCONTINUED | OUTPATIENT
Start: 2018-01-13 | End: 2018-01-18 | Stop reason: HOSPADM

## 2018-01-13 RX ORDER — CLINDAMYCIN PHOSPHATE 900 MG/50ML
900 INJECTION, SOLUTION INTRAVENOUS ONCE
Status: COMPLETED | OUTPATIENT
Start: 2018-01-13 | End: 2018-01-13

## 2018-01-13 RX ORDER — ACETAMINOPHEN 325 MG/1
650 TABLET ORAL EVERY 4 HOURS PRN
Status: DISCONTINUED | OUTPATIENT
Start: 2018-01-16 | End: 2018-01-18 | Stop reason: HOSPADM

## 2018-01-13 RX ORDER — ONDANSETRON 2 MG/ML
INJECTION INTRAMUSCULAR; INTRAVENOUS PRN
Status: DISCONTINUED | OUTPATIENT
Start: 2018-01-13 | End: 2018-01-13

## 2018-01-13 RX ORDER — ONDANSETRON 4 MG/1
4 TABLET, ORALLY DISINTEGRATING ORAL EVERY 6 HOURS PRN
Status: DISCONTINUED | OUTPATIENT
Start: 2018-01-13 | End: 2018-01-18 | Stop reason: HOSPADM

## 2018-01-13 RX ADMIN — CLINDAMYCIN PHOSPHATE 900 MG: 18 INJECTION, SOLUTION INTRAVENOUS at 21:03

## 2018-01-13 RX ADMIN — CLINDAMYCIN PHOSPHATE 900 MG: 18 INJECTION, SOLUTION INTRAVENOUS at 13:51

## 2018-01-13 RX ADMIN — PROPOFOL 100 MG: 10 INJECTION, EMULSION INTRAVENOUS at 08:08

## 2018-01-13 RX ADMIN — FENTANYL CITRATE 25 MCG: 50 INJECTION INTRAMUSCULAR; INTRAVENOUS at 10:10

## 2018-01-13 RX ADMIN — SENNOSIDES AND DOCUSATE SODIUM 1 TABLET: 8.6; 5 TABLET ORAL at 12:04

## 2018-01-13 RX ADMIN — LIDOCAINE HYDROCHLORIDE 100 MG: 20 INJECTION, SOLUTION INFILTRATION; PERINEURAL at 08:08

## 2018-01-13 RX ADMIN — PIPERACILLIN SODIUM AND TAZOBACTAM SODIUM 3.38 G: 36; 4.5 INJECTION, POWDER, LYOPHILIZED, FOR SOLUTION INTRAVENOUS at 12:43

## 2018-01-13 RX ADMIN — SODIUM CHLORIDE: 9 INJECTION, SOLUTION INTRAVENOUS at 07:53

## 2018-01-13 RX ADMIN — GABAPENTIN 300 MG: 300 CAPSULE ORAL at 13:51

## 2018-01-13 RX ADMIN — SODIUM CHLORIDE 1000 ML: 9 INJECTION, SOLUTION INTRAVENOUS at 03:49

## 2018-01-13 RX ADMIN — ACETAMINOPHEN 975 MG: 325 TABLET ORAL at 10:17

## 2018-01-13 RX ADMIN — SODIUM CHLORIDE 60 ML: 9 INJECTION, SOLUTION INTRAVENOUS at 02:11

## 2018-01-13 RX ADMIN — HYDROMORPHONE HYDROCHLORIDE 0.5 MG: 1 INJECTION, SOLUTION INTRAMUSCULAR; INTRAVENOUS; SUBCUTANEOUS at 10:36

## 2018-01-13 RX ADMIN — ACETAMINOPHEN 975 MG: 325 TABLET ORAL at 18:34

## 2018-01-13 RX ADMIN — ERTAPENEM SODIUM 1 G: 1 INJECTION, POWDER, LYOPHILIZED, FOR SOLUTION INTRAMUSCULAR; INTRAVENOUS at 03:51

## 2018-01-13 RX ADMIN — TAZOBACTAM SODIUM AND PIPERACILLIN SODIUM 3.38 G: 375; 3 INJECTION, SOLUTION INTRAVENOUS at 01:43

## 2018-01-13 RX ADMIN — FENTANYL CITRATE 25 MCG: 50 INJECTION INTRAMUSCULAR; INTRAVENOUS at 09:53

## 2018-01-13 RX ADMIN — METFORMIN HYDROCHLORIDE 1000 MG: 500 TABLET ORAL at 12:04

## 2018-01-13 RX ADMIN — SODIUM CHLORIDE, POTASSIUM CHLORIDE, SODIUM LACTATE AND CALCIUM CHLORIDE: 600; 310; 30; 20 INJECTION, SOLUTION INTRAVENOUS at 10:17

## 2018-01-13 RX ADMIN — HYDROMORPHONE HYDROCHLORIDE 0.5 MG: 1 INJECTION, SOLUTION INTRAMUSCULAR; INTRAVENOUS; SUBCUTANEOUS at 03:20

## 2018-01-13 RX ADMIN — PIPERACILLIN SODIUM AND TAZOBACTAM SODIUM 3.38 G: 36; 4.5 INJECTION, POWDER, LYOPHILIZED, FOR SOLUTION INTRAVENOUS at 18:34

## 2018-01-13 RX ADMIN — FENTANYL CITRATE 50 MCG: 50 INJECTION INTRAMUSCULAR; INTRAVENOUS at 10:03

## 2018-01-13 RX ADMIN — CLINDAMYCIN PHOSPHATE 900 MG: 18 INJECTION, SOLUTION INTRAVENOUS at 04:25

## 2018-01-13 RX ADMIN — MIDAZOLAM 2 MG: 1 INJECTION INTRAMUSCULAR; INTRAVENOUS at 07:53

## 2018-01-13 RX ADMIN — SUGAMMADEX 200 MG: 100 INJECTION, SOLUTION INTRAVENOUS at 09:25

## 2018-01-13 RX ADMIN — ROCURONIUM BROMIDE 50 MG: 10 INJECTION INTRAVENOUS at 08:20

## 2018-01-13 RX ADMIN — FENTANYL CITRATE 100 MCG: 50 INJECTION, SOLUTION INTRAMUSCULAR; INTRAVENOUS at 08:55

## 2018-01-13 RX ADMIN — IOPAMIDOL 100 ML: 755 INJECTION, SOLUTION INTRAVENOUS at 02:10

## 2018-01-13 RX ADMIN — ASPIRIN 81 MG CHEWABLE TABLET 81 MG: 81 TABLET CHEWABLE at 12:04

## 2018-01-13 RX ADMIN — ONDANSETRON 4 MG: 2 INJECTION INTRAMUSCULAR; INTRAVENOUS at 09:21

## 2018-01-13 RX ADMIN — LORAZEPAM 0.5 MG: 2 INJECTION INTRAMUSCULAR; INTRAVENOUS at 03:18

## 2018-01-13 RX ADMIN — FENTANYL CITRATE 250 MCG: 50 INJECTION, SOLUTION INTRAMUSCULAR; INTRAVENOUS at 08:08

## 2018-01-13 RX ADMIN — SODIUM CHLORIDE 1000 ML: 9 INJECTION, SOLUTION INTRAVENOUS at 01:43

## 2018-01-13 RX ADMIN — HYDROMORPHONE HYDROCHLORIDE 0.5 MG: 1 INJECTION, SOLUTION INTRAMUSCULAR; INTRAVENOUS; SUBCUTANEOUS at 10:21

## 2018-01-13 RX ADMIN — GABAPENTIN 300 MG: 300 CAPSULE ORAL at 20:34

## 2018-01-13 RX ADMIN — Medication 100 MG: at 08:08

## 2018-01-13 ASSESSMENT — ENCOUNTER SYMPTOMS
CHILLS: 1
FEVER: 1
MUSCULOSKELETAL NEGATIVE: 1
ACTIVITY CHANGE: 1
APPETITE CHANGE: 1
NEUROLOGICAL NEGATIVE: 1
ABDOMINAL PAIN: 0
NAUSEA: 1
ORTHOPNEA: 0
RESPIRATORY NEGATIVE: 1
EYES NEGATIVE: 1
CARDIOVASCULAR NEGATIVE: 1
FATIGUE: 1
PSYCHIATRIC NEGATIVE: 1
BLOOD IN STOOL: 0
VOMITING: 0
ORTHOPNEA: 0
DIFFICULTY URINATING: 1

## 2018-01-13 ASSESSMENT — COPD QUESTIONNAIRES
COPD: 0
COPD: 0

## 2018-01-13 ASSESSMENT — LIFESTYLE VARIABLES
TOBACCO_USE: 1
TOBACCO_USE: 1

## 2018-01-13 NOTE — ANESTHESIA POSTPROCEDURE EVALUATION
Patient: Estevan Callejas    Procedure(s):  Irrigation and Debridement of perianal abscess - Wound Class: IV-Dirty or Infected    Diagnosis:perianal abscess; carson's gangrene  Diagnosis Additional Information: No value filed.    Anesthesia Type:  General, RSI    Note:  Anesthesia Post Evaluation    Patient location during evaluation: PACU  Patient participation: Able to fully participate in evaluation  Level of consciousness: awake and alert  Pain management: satisfactory to patient  Airway patency: patent  Cardiovascular status: acceptable and stable  Respiratory status: acceptable and spontaneous ventilation  Hydration status: euvolemic  PONV: controlled     Anesthetic complications: None          Last vitals:  Vitals:    01/13/18 0935 01/13/18 0945 01/13/18 1000   BP: 139/76 147/84 121/68   Pulse:      Resp: 18 20 22   Temp: 36.8  C (98.2  F) 36.8  C (98.3  F)    SpO2: 96% 96% 98%         Electronically Signed By: Nicho Bowden MD  January 13, 2018  10:14 AM

## 2018-01-13 NOTE — ANESTHESIA CARE TRANSFER NOTE
Patient: Estevan Callejas    Procedure(s):  Irrigation and Debridement of perianal abscess - Wound Class: IV-Dirty or Infected    Diagnosis: perianal abscess; carson's gangrene  Diagnosis Additional Information: No value filed.    Anesthesia Type:   General, RSI     Note:  Airway :Face Mask  Patient transferred to:PACU  Comments: Anesthesia Care Transfer Note    Patient: Estevan Callejas    Transferred to: PACU    Patient vital signs: stable    Airway: none    Monitors on, VSS, stable spontaneous respirations  Dave Gorman CRNA  1/13/2018 9:39 AM      Handoff Report: Identifed the Patient, Identified the Reponsible Provider, Reviewed the pertinent medical history, Discussed the surgical course, Reviewed Intra-OP anesthesia mangement and issues during anesthesia, Set expectations for post-procedure period and Allowed opportunity for questions and acknowledgement of understanding      Vitals: (Last set prior to Anesthesia Care Transfer)    CRNA VITALS  1/13/2018 0904 - 1/13/2018 0939      1/13/2018             Pulse: 110    SpO2: 99 %    Resp Rate (set): 10                Electronically Signed By: ANDRES Herman CRNA  January 13, 2018  9:39 AM

## 2018-01-13 NOTE — ANESTHESIA PREPROCEDURE EVALUATION
Anesthesia Evaluation     . Pt has had prior anesthetic. Type: General (Grade I view; easy)    No history of anesthetic complications          ROS/MED HX    ENT/Pulmonary:     (+)TRINITY risk factors snores loudly, hypertension, obese, daytime somnolence, tobacco use, Current use 0.5 packs/day  , . .   (-) asthma and COPD   Neurologic: Comment: S1 neuropathy    (+)neuropathy - S1,     Cardiovascular:  - neg cardiovascular ROS   (+) hypertension----. : . . . :. .      (-) JEROME, orthopnea/PND, irregular heartbeat/palpitations and valvular problems/murmurs   METS/Exercise Tolerance:     Hematologic:  - neg hematologic  ROS       Musculoskeletal: Comment: Recent fall with chest wall pain, neg CXR        GI/Hepatic:  - neg GI/hepatic ROS      (-) GERD   Renal/Genitourinary:  - ROS Renal section negative       Endo: Comment: Body mass index is 36.92 kg/(m^2).      (+) type II DM Not using insulin Obesity, .      Psychiatric:     (+) psychiatric history other (comment) (heavy alcohol use)      Infectious Disease:   (+) Recent Fever, Other Infectious Disease perirectal abscess - fourier's; s/p I&D 1/13      Malignancy:      - no malignancy   Other:    - neg other ROS                 Physical Exam  Normal systems: pulmonary and dental    Airway   Mallampati: II  TM distance: >3 FB  Neck ROM: full    Dental     Cardiovascular   Rhythm and rate: regular and normal      Pulmonary                      Lab Results   Component Value Date    WBC 17.8 (H) 01/13/2018    HGB 12.9 (L) 01/13/2018    HCT 38.4 (L) 01/13/2018     01/13/2018     (L) 01/13/2018     11/07/2016     02/18/2015    POTASSIUM 3.7 01/13/2018    POTASSIUM 4.3 11/07/2016    POTASSIUM 3.8 02/18/2015    CHLORIDE 97 01/13/2018    CHLORIDE 103 11/07/2016    CHLORIDE 104 02/18/2015    CO2 25 01/13/2018    CO2 24 11/07/2016    CO2 26 02/18/2015    BUN 8 01/13/2018    BUN 11 11/07/2016    BUN 10 02/18/2015    CR 0.71 01/13/2018    CR 0.80 11/07/2016     CR 0.59 (L) 02/18/2015     (H) 01/13/2018     (H) 11/07/2016     (H) 02/18/2015    SED 8 02/18/2015    AST 59 (H) 01/13/2018    AST 18 11/07/2016    AST 36 02/18/2015     (H) 01/13/2018    ALT 57 11/07/2016    ALT 58 02/18/2015    ALKPHOS 89 01/13/2018    ALKPHOS 97 11/07/2016    ALKPHOS 96 02/18/2015    BILITOTAL 1.0 01/13/2018    BILITOTAL 0.4 11/07/2016    BILITOTAL 0.5 02/18/2015    INR 1.26 (H) 01/13/2018       Anesthesia Plan      History & Physical Review  History and physical reviewed and following examination; no interval change.    ASA Status:  3 .        Plan for General and ETT with Intravenous and Propofol induction. Maintenance will be Balanced.    PONV prophylaxis:  Ondansetron (or other 5HT-3) and Dexamethasone or Solumedrol    Mounika Moncada MD  [facility] 3 Anesthesia Resident  Pager [phone]       Postoperative Care  Postoperative pain management:  IV analgesics.      Consents

## 2018-01-13 NOTE — ED PROVIDER NOTES
"  History     Chief Complaint   Patient presents with     Penis/Scrotum Problem     HPI  Estevan Callejas is a 42 year old male who presents to emergency department as a transfer for general surgery.  Patient was seen at [facility] and diagnosed with a large perineal abscess.  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  Patient was provided with triple antibiotics prior to coming into the emergency room.  Patient states that he noticed the swelling on Monday or Tuesday.  Initially the thought was that he was probably constipated but when he arrived to the emergency room today a CT was performed which showed a large abscess.    IMPRESSION: Large perineal collection of fluid and gas with  inflammation characterized by both air and fluid extending into and  involving the scrotum      I have reviewed the Medications, Allergies, Past Medical and Surgical History, and Social History in the Epic system.    Review of Systems   All other systems reviewed and are negative.      Physical Exam   BP: 118/72  Pulse: 100  Temp: 101  F (38.3  C)  Resp: 18  Height: 175.3 cm (5' 9\")  Weight: 113.4 kg (250 lb)  SpO2: 97 %      Physical Exam   Constitutional: He is oriented to person, place, and time. He appears well-developed and well-nourished. No distress.   HENT:   Head: Normocephalic and atraumatic.   Eyes: No scleral icterus.   Neck: Normal range of motion. Neck supple.   Cardiovascular: Normal rate.    Pulmonary/Chest: Effort normal.   Abdominal: Soft.   Painful swelling in perineal area    Musculoskeletal: Normal range of motion.   Neurological: He is alert and oriented to person, place, and time.   Skin: Skin is warm and dry. No rash noted. He is not diaphoretic. No erythema. No pallor.       ED Course     ED Course     Procedures             Critical Care time:  was 30 minutes for this patient excluding procedures.             Labs Ordered and Resulted from Time of ED Arrival Up to the Time of Departure from the ED - No " data to display         Assessments & Plan (with Medical Decision Making)   This is a 42-year-old male coming into emergency room as a transfer from [facility] emergency room with a diagnosis of a perineal abscess.  Patient is currently stable and is already received antibiotics.  General surgery is at the bedside at the time of my initial evaluation.  Their plans are to take him for emergency surgery.  At this time labs were sent and the patient will be sent directly to the operating room.    I have reviewed the nursing notes.    I have reviewed the findings, diagnosis, plan and need for follow up with the patient.    New Prescriptions    No medications on file       Final diagnoses:   Abscess       1/13/2018   [facility], [place], EMERGENCY DEPARTMENT     Nicho Clayton MD  01/13/18 0612

## 2018-01-13 NOTE — ED NOTES
Pt BIBA from [facility]  c/o mass in scrotal area, gangrenous. Was given 0.5mg of Dilaudid and 2 mg of Ativan at [facility] facility. Received 1 liter of NS. Finished axb's  Clindamycin, Invenz, and Zosyn. Pt is to have surgery.

## 2018-01-13 NOTE — ANESTHESIA PREPROCEDURE EVALUATION
Anesthesia Evaluation     . Pt has had prior anesthetic. Type: General    No history of anesthetic complications          ROS/MED HX    ENT/Pulmonary:     (+)TRINITY risk factors snores loudly, hypertension, obese, daytime somnolence, tobacco use, Current use 0.5 packs/day  , . .   (-) asthma and COPD   Neurologic: Comment: S1 neuropathy    (+)neuropathy - S1,     Cardiovascular:  - neg cardiovascular ROS   (+) hypertension----. : . . . :. .      (-) JEROME, orthopnea/PND, irregular heartbeat/palpitations and valvular problems/murmurs   METS/Exercise Tolerance:  >4 METS   Hematologic: Comments: hgb 12.9 - neg hematologic  ROS       Musculoskeletal: Comment: Recent fall with chest wall pain, neg CXR        GI/Hepatic:  - neg GI/hepatic ROS      (-) GERD   Renal/Genitourinary:  - ROS Renal section negative       Endo: Comment: Body mass index is 36.92 kg/(m^2).      (+) type II DM Not using insulin Obesity, .      Psychiatric:     (+) psychiatric history other (comment) (heavy alcohol use)      Infectious Disease: Comment: Received clindamycin, ertapenem, zosyn in [place] [facility]  (+) Recent Fever, Other Infectious Disease perirectal abscess - fourier's      Malignancy:      - no malignancy   Other:    - neg other ROS                 Physical Exam  Normal systems: pulmonary    Airway   Mallampati: III  TM distance: >3 FB  Neck ROM: full    Dental   Comment: Poor dentition    Cardiovascular   Rhythm and rate: regular and normal  (-) no murmur    Pulmonary    breath sounds clear to auscultation(+) rales   (-) no wheezes  PE comment: Crackles at BB                    Anesthesia Plan      History & Physical Review      ASA Status:  3 emergent.    NPO Status:  > 8 hours    Plan for General and RSI with Intravenous induction. Maintenance will be Balanced.    PONV prophylaxis:  Ondansetron (or other 5HT-3) and Dexamethasone or Solumedrol  Additional equipment: Videolaryngoscope      Postoperative Care  Postoperative pain management:   IV analgesics, Oral pain medications and Multi-modal analgesia.      Consents                          .

## 2018-01-13 NOTE — ED NOTES
Pt c/o mass between his anus and penis.  States that he was seen in clinic for it on Tuesday.  Developed a fever yesterday.  Today pain has gotten significantly worse.  Did think he was constipated earlier in the week.

## 2018-01-13 NOTE — PLAN OF CARE
Problem: Pain, Acute (Adult)  Goal: Identify Related Risk Factors and Signs and Symptoms  Related risk factors and signs and symptoms are identified upon initiation of [employer] Clinical Practice Guideline (CPG).   Outcome: No Change  Pt w/softer pressures and low urine output, 500ml bolus administered x2 w/some improvement in urine output, pink in color. L ELIDA w/moderate amount sereoussanguinous output, dressing changed. Pt OOB w/asst x1, tolerated well, quite tired after ambulation. Midline intact, drainage from leaking urostomy over part of dressing. Epidural at 6ml/hr, PCA 0.2mg q10 q/adequate relief. Clear liquid diet, tolerating well, denies n/v, PO intake encouraged. BS faint, no flatus. Pt able to make needs known, cont POC.

## 2018-01-13 NOTE — IP AVS SNAPSHOT
MRN:[id number]                      After Visit Summary   1/13/2018    Estevan Callejas    MRN: [id number]           Thank you!     Thank you for choosing [facility] for your care. Our goal is always to provide you with excellent care. Hearing back from our patients is one way we can continue to improve our services. Please take a few minutes to complete the written survey that you may receive in the mail after you visit with us. Thank you!        Patient Information     Date Of Birth          1975        Designated Caregiver       Most Recent Value    Caregiver    Will someone help with your care after discharge? yes    Name of designated caregiver Yas Ansari [sister]    Phone number of caregiver [phone]    Caregiver address unknown      About your hospital stay     You were admitted on:  January 13, 2018 You last received care in the:  [address] [facility]    You were discharged on:  January 18, 2018        Reason for your hospital stay       You were admitted for surgical management of a perirectal abscess. This abscess was opened and drained, and continues to remain open with a wound vacuum.                  Who to Call     For medical emergencies, please call 911.  For non-urgent questions about your medical care, please call your primary care provider or clinic, [phone]  For questions related to your surgery, please call your surgery clinic        Attending Provider     Provider Specialty    Nicho Clayton MD Emergency Medicine    Stefania Munroe MD Surgery       Primary Care Provider Office Phone # Fax #    Lawanda Barrera [phone]       When to contact your care team       Call your primary doctor if you have any of the following: temperature greater than 100.4F or less than 97, increased drainage, increased swelling or increased pain.  Call Wound Care if you have any of the following: increased drainage, leakage around wound vacuum,  or if wound vacuum is not keeping an adequate seal and malfunctioning. You may use wet-to-dry dressing until the vacuum can be fixed/adjusted/respositioned.                  After Care Instructions     Activity       Your activity upon discharge: activity as tolerated            Diet       Follow this diet upon discharge: Regular Diet Adult            Discharge Equipment: Wound Vac       Negative Pressure Wound Therapy to 125 wound with continuous suction.     Cleanse wound with saline or wound cleanser. Dressing change Monday/Wednesday/Friday for 2 weeks. Change canister weekly and when full.     Supplies: contact layer and black foam    Teach pt/family how to remove dressing and apply wet to damp dressing, in the event that dressing leaks or machine malfunctions.  Consult [facility] nurse.      Follow-up for wound care .            Discharge Instructions       If you have questions about your follow-up appointment, please call the General Surgery Clinic at [phone].     WHEN TO CALL OR SEEK CARE:  Please call or seek medical attention if you experience increasing abdominal pain, nausea, vomiting, increasing drainage from or spreading redness around your wounds, chills, or fever >101.5.   - During normal business hours, please contact the General Surgery clinic at [phone].  - If you are having troubles in the evenings, at night, or on weekends, call [phone] and ask to speak with surgery resident on call.    Do not lift more than 20 pounds for 4-6 weeks after surgery.     Keep wound clean and dry, okay to use wipes over the area. Sponge bath only, do not take showers or soak in tub.     Do not soak in a bath tub or pool.    No driving while on narcotic pain medications.     Take miralax daily, if having diarrhea please skip the miralax for one day.    You may take Tylenol (acetaminophen) and/or Motrin (ibuprofen) in addition or instead of narcotic medication; it is helpful to take these medications as you  wean down off of the narcotic medications. If you have been prescribed Percocet, be aware that it contains Tylenol and oxycodone together. Do not take a total of more than 3500 mg of Tylenol per 24 hours to avoid liver damage.            Wound care and dressings       Instructions to care for your wound at home: You will need wound vacuum changes Monday/Wednesday/Friday for at least 2 weeks. You will then follow-up with the General Surgery team as an outpatient, and then will likely transition to wet-to-dry dressing changes two times daily.                  Follow-up Appointments     Adult [facility] Follow-up and recommended labs and tests       Follow up with primary care provider, Lawanda Barrera, within 7 days for hospital follow- up.       Appointments on [facility] and/or [facility] (with [facility] or [facility] provider or service). Call [phone] if you haven't heard regarding these appointments within 7 days of discharge.            Follow Up and recommended labs and tests       Follow up with General Surgery in 2 weeks.                  Your next 10 appointments already scheduled     Jan 19, 2018 12:30 PM CST   NEW WOUND NURSE VISIT with Patrica Johnson RN   [facility] Wound Ostomy ([facility])    [address] [place] [id number]   [phone]            Jan 22, 2018  8:30 AM CST   NEW WOUND NURSE VISIT with Patrica Johnson RN   [facility] Wound Ostomy ([facility])    [address] [place] [address]   [phone]            Jan 24, 2018  8:30 AM CST   NEW WOUND NURSE VISIT with Christiano Calvillo RN   [facility] Wound Ostomy ([facility])    [address] [place] [address]   [phone]            Jan 26, 2018  8:30 AM CST   NEW WOUND NURSE VISIT with Patrica Johnson RN   [facility] Wound Ostomy ([facility])    [address] [place] [address]  [address]  [address] [place] [id number] [phone]            Jan 29, 2018  8:30 AM CST   NEW WOUND NURSE VISIT with Patrica Johnson RN   [facility] Wound Ostomy ([facility])    [address] [place] [id number] [phone]            Jan 31, 2018  8:30 AM CST   NEW WOUND NURSE VISIT with Christiano Calvillo RN   [facility] Wound Ostomy ([facility])    [address] [place] [id number] [phone]              Additional Services     Home care nursing referral       [facility]  Phone  [phone]  Fax  [phone]    Home care to follow after patient has wound VAC removed in 2 weeks      Skilled  RN to assess vital signs and weight, respiratory and cardiac status, pain level and activity tolerance,monitor wound  for signs/symptoms of infection, hydration, nutrition and bowel status and home safety.    RN to assist with Wound VAC changes M-W-F     RN to teach medication management.    Your provider has ordered home care nursing services. If you have not been contacted within 2 days of your discharge please call the inpatient department phone number at [phone] .                  Pending Results     Date and Time Order Name Status Description    1/13/2018 0856 Fungus Culture, non-blood Preliminary     1/13/2018 0856 Anaerobic bacterial culture Preliminary             Statement of Approval     Ordered          01/18/18 1812  I have reviewed and agree with all the recommendations and orders detailed in this document.  EFFECTIVE NOW     Approved and electronically signed by:  Shannan Wheat MD             Admission Information     Date & Time Provider Department Dept. Phone    1/13/2018 Stefania Munroe MD [address] [facility] [place] [phone]      Your Vitals Were     Blood Pressure Pulse Temperature Respirations Height Weight    148/89 (BP Location: Right arm) 64 97.7  F (36.5  " C) (Oral) 16 1.753 m (5' 9\") 114 kg (251 lb 5.2 oz)    Pulse Oximetry BMI (Body Mass Index)                98% 37.11 kg/m2          [employer] Information     [employer] lets you send messages to your doctor, view your test results, renew your prescriptions, schedule appointments and more. To sign up, go to [email or web address] . Click on \"Log in\" on the left side of the screen, which will take you to the Welcome page. Then click on \"Sign up Now\" on the right side of the page.     You will be asked to enter the access code listed below, as well as some personal information. Please follow the directions to create your username and password.     Your access code is: [email or web address]  Expires: 2018 11:42 AM     Your access code will  in 90 days. If you need help or a new code, please call your [place] clinic or [phone].        Care EveryWhere ID     This is your Care EveryWhere ID. This could be used by other organizations to access your [place] medical records  [id number]        Equal Access to Services     [facility] [employer] ANGEL : Hadii jonas Acuna, yane mcguire, elfego fernández, elisabeth isabel . So [facility] [phone].    ATENCIÓN: Si habla español, tiene a parker disposición servicios gratuitos de asistencia lingüística. Brandyn al [phone].    We comply with applicable federal civil rights laws and [place] laws. We do not discriminate on the basis of race, color, national origin, age, disability, sex, sexual orientation, or gender identity.               Review of your medicines      START taking        Dose / Directions    acetaminophen 325 MG tablet   Commonly known as:  TYLENOL   Used for:  Perirectal abscess        Dose:  650 mg   Take 2 tablets (650 mg) by mouth every 4 hours as needed for other (multimodal surgical pain management along with NSAIDS and opioid medication as indicated based on pain control and physical function.)   Quantity:  100 tablet "   Refills:  0       oxyCODONE IR 5 MG tablet   Commonly known as:  ROXICODONE   Used for:  Perirectal abscess        Dose:  5-10 mg   Take 1-2 tablets (5-10 mg) by mouth every 3 hours as needed for breakthrough pain or moderate to severe pain   Quantity:  30 tablet   Refills:  0       polyethylene glycol Packet   Commonly known as:  MIRALAX/GLYCOLAX   Used for:  Drug-induced constipation        Dose:  17 g   Take 17 g by mouth daily as needed for constipation   Quantity:  30 packet   Refills:  1         CONTINUE these medicines which have NOT CHANGED        Dose / Directions    aspirin 81 MG tablet   Used for:  Type 2 diabetes mellitus without complication, without long-term current use of insulin (H)        Dose:  81 mg   Take 1 tablet (81 mg) by mouth daily   Quantity:  90 tablet   Refills:  3       blood glucose lancets standard   Commonly known as:  no brand specified   Used for:  Type 2 diabetes mellitus with complication, without long-term current use of insulin (H)        Use to test blood sugar one times daily or as directed.   Quantity:  1 Box   Refills:  0       blood glucose monitoring meter device kit   Commonly known as:  no brand specified   Used for:  Type 2 diabetes mellitus with complication, without long-term current use of insulin (H)        Use to test blood sugar one times daily or as directed.   Quantity:  1 kit   Refills:  0       blood glucose monitoring test strip   Commonly known as:  no brand specified   Used for:  Type 2 diabetes mellitus with complication, without long-term current use of insulin (H)        Use to test blood sugars  One  times daily in am   Quantity:  90 each   Refills:  3       gabapentin 300 MG capsule   Commonly known as:  NEURONTIN   Used for:  Type 2 diabetes mellitus without complication, without long-term current use of insulin (H)        Dose:  300 mg   Take 1 capsule (300 mg) by mouth 3 times daily   Quantity:  270 capsule   Refills:  1       lisinopril 2.5 MG  tablet   Commonly known as:  PRINIVIL/Zestril   Used for:  Type 2 diabetes mellitus without complication, without long-term current use of insulin (H)        Dose:  2.5 mg   Take 1 tablet (2.5 mg) by mouth daily   Quantity:  90 tablet   Refills:  1       metFORMIN 500 MG tablet   Commonly known as:  GLUCOPHAGE   Used for:  Type 2 diabetes mellitus with complication, without long-term current use of insulin (H)        TAKE TWO TABLETS BY MOUTH TWICE A DAY WITH MEALS   Quantity:  120 tablet   Refills:  0         STOP taking     oxyCODONE-acetaminophen 5-325 MG per tablet   Commonly known as:  PERCOCET                Where to get your medicines      These medications were sent to [facility] [place], [place] - [address]  [address] [address]     Phone:  [phone]     polyethylene glycol Packet         Some of these will need a paper prescription and others can be bought over the counter. Ask your nurse if you have questions.     Bring a paper prescription for each of these medications     oxyCODONE IR 5 MG tablet       You don't need a prescription for these medications     acetaminophen 325 MG tablet                Protect others around you: Learn how to safely use, store and throw away your medicines at www.disposemymeds.org.             Medication List: This is a list of all your medications and when to take them. Check marks below indicate your daily home schedule. Keep this list as a reference.      Medications           Morning Afternoon Evening Bedtime As Needed    acetaminophen 325 MG tablet   Commonly known as:  TYLENOL   Take 2 tablets (650 mg) by mouth every 4 hours as needed for other (multimodal surgical pain management along with NSAIDS and opioid medication as indicated based on pain control and physical function.)   Last time this was given:  650 mg on 1/17/2018  2:29 PM                                aspirin 81 MG tablet   Take 1 tablet (81 mg) by  mouth daily                                blood glucose lancets standard   Commonly known as:  no brand specified   Use to test blood sugar one times daily or as directed.                                blood glucose monitoring meter device kit   Commonly known as:  no brand specified   Use to test blood sugar one times daily or as directed.                                blood glucose monitoring test strip   Commonly known as:  no brand specified   Use to test blood sugars  One  times daily in am                                gabapentin 300 MG capsule   Commonly known as:  NEURONTIN   Take 1 capsule (300 mg) by mouth 3 times daily   Last time this was given:  300 mg on 1/18/2018  1:39 PM                                lisinopril 2.5 MG tablet   Commonly known as:  PRINIVIL/Zestril   Take 1 tablet (2.5 mg) by mouth daily                                metFORMIN 500 MG tablet   Commonly known as:  GLUCOPHAGE   TAKE TWO TABLETS BY MOUTH TWICE A DAY WITH MEALS   Last time this was given:  1,000 mg on 1/17/2018  8:32 AM                                oxyCODONE IR 5 MG tablet   Commonly known as:  ROXICODONE   Take 1-2 tablets (5-10 mg) by mouth every 3 hours as needed for breakthrough pain or moderate to severe pain   Last time this was given:  10 mg on 1/18/2018  3:59 PM                                polyethylene glycol Packet   Commonly known as:  MIRALAX/GLYCOLAX   Take 17 g by mouth daily as needed for constipation

## 2018-01-13 NOTE — H&P
"General Surgery Admission History and Physical    Patient: Estevan Callejas    MRN# [id number]   YOB: 1975       Date of Admission: 1/13/2018    Primary care provider: Lawanda Barrera            Chief Complaint:   Ronit-anal pain  Per-anal swelling  Fevers    History is obtained from the patient          History of Present Illness:   Estevan Callejas is a 42 year old male with a past medical history significant for DM type II who presents with a 5 day history of fevers,  Ronit-anal pain and ronit-anal swelling.  first noticed his symptoms on 1/8/2017 when he started have ronit-anal discomfort. He saw a physician on 1/10 who attributed it to constipation. Over the last 2 days, he has noticed worsening perianal pain, scrotal and perianal swelling and associated fevers, diaphoresis and chills. He denies any dysuria, difficulty in initiating urination , nausea, emesis ,abdominal pain , SOB or chest pain. He reports having a BM yesterday after an enema but denies noticing any blood in the stools.     He denies any trauma to his perineum. He denies any FH of colorectal cancer or IBD. He denies any FH of bleeding or clotting disorders. He denies use of anticoagulants.     Workup in ED revealed a WBC of 17.8, Hb 12.99, Plt 163, Na 131, Cr 0.71,  and LA 1.7 and . CT scan of abdomen and pelvis revealed \"large fluid collection in the perineum with an air-fluid level. This collection measures approximately 9.7 cm AP by 5.2 cm transverse by 6.5 cm craniocaudal. In addition, gas and fluid are evident extending up into the scrotum. Soft tissues of the proximal legs appear normal\"            Past Medical History:     Past Medical History:   Diagnosis Date     Diabetes (H)             Past Surgical History:     Past Surgical History:   Procedure Laterality Date     ORTHOPEDIC SURGERY       TONSILLECTOMY, ADENOIDECTOMY, COMBINED              Social History:     Social History   Substance Use Topics     " "Smoking status: Current Some Day Smoker     Types: Dip, chew, snus or snuff, Cigarettes     Smokeless tobacco: Current User     Alcohol use Yes            Family History:   No family history on file.         Immunizations:     VACCINE/DOSE   Diptheria   DPT   DTAP   HBIG   Hepatitis A   Hepatitis B   HIB   Influenza   Measles   Meningococcal   MMR   Mumps   Pneumococcal   Polio   Rubella   Small Pox   TDAP   Varicella   Zoster            Allergies:   No Known Allergies         Medications:     No current facility-administered medications for this encounter.      Current Outpatient Prescriptions   Medication Sig     oxyCODONE-acetaminophen (PERCOCET) 5-325 MG per tablet Take 1-2 tablets by mouth every 4 hours as needed     metFORMIN (GLUCOPHAGE) 500 MG tablet TAKE TWO TABLETS BY MOUTH TWICE A DAY WITH MEALS     gabapentin (NEURONTIN) 300 MG capsule Take 1 capsule (300 mg) by mouth 3 times daily     lisinopril (PRINIVIL/ZESTRIL) 2.5 MG tablet Take 1 tablet (2.5 mg) by mouth daily (Patient not taking: Reported on 1/10/2018)     aspirin 81 MG tablet Take 1 tablet (81 mg) by mouth daily     blood glucose monitoring (NO BRAND SPECIFIED) test strip Use to test blood sugars  One  times daily in am     blood glucose monitoring (NO BRAND SPECIFIED) meter device kit Use to test blood sugar one times daily or as directed.     blood glucose (NO BRAND SPECIFIED) lancets standard Use to test blood sugar one times daily or as directed.             Review of Systems:   The review of systems was positive for the following findings.The remainder of the review of systems was unremarkable.         Physical Exam:   Patient Vitals for the past 24 hrs:   BP Temp Temp src Pulse Resp SpO2 Height Weight   01/13/18 0548 118/72 101  F (38.3  C) Oral 100 18 97 % 1.753 m (5' 9\") 113.4 kg (250 lb)       General: age-appropriate appearing male in NAD. Obese  Resp: no respiratory distress, lung sounds clear.  CV: heart rate regular, S1, S2.  Back: " no CVAT.  Abdomen: soft, obese, non-distended, non-tender.  / perineum: moderate scrotal swelling involving the lower 1/3 of scrotum, large area of lola-anal swelling which is edematous, indurated with interspersed areas of fluctuance, no crepitus appreciated.   Neuro: moving all 4 extremities equally.  Skin: no rashes noted.          Data:   All laboratory data reviewed  Results for orders placed or performed during the hospital encounter of 01/13/18   CT Abdomen Pelvis w Contrast    Narrative    CT ABDOMEN PELVIS W CONTRAST 1/13/2018 2:30 AM    HISTORY: Question Marley's gangrene.    COMPARISON: None.    TECHNIQUE:  Abdomen and pelvis CT was performed following intravenous  administration of 100mls Isovue 370.  Radiation dose for this scan was  reduced using automated exposure control, adjustment of the mA and/or  kV according to patient size, or iterative reconstruction technique.    FINDINGS: Old benign calcified right hilar lymph nodes and benign  granuloma in the right lung. Lung bases are otherwise clear.    Liver, gallbladder, spleen, pancreas, adrenal glands and kidneys  appear normal.    Normal caliber bowel. Normal appendix. No free air. No free or  loculated intraperitoneal fluid collection.    Urinary bladder is distended with normal wall thickness. No free fluid  in the pelvis.    There is a large fluid collection in the perineum with an air-fluid  level. This collection measures approximately 9.7 cm AP by 5.2 cm  transverse by 6.5 cm craniocaudal. In addition, gas and fluid are  evident extending up into the scrotum. Soft tissues of the proximal  legs appear normal.      Impression    IMPRESSION: Large perineal collection of fluid and gas with  inflammation characterized by both air and fluid extending into and  involving the scrotum.    RITIKA PADILLA MD   CBC with platelets differential   Result Value Ref Range    WBC 17.8 (H) 4.0 - 11.0 10e9/L    RBC Count 4.12 (L) 4.4 - 5.9 10e12/L     Hemoglobin 12.9 (L) 13.3 - 17.7 g/dL    Hematocrit 38.4 (L) 40.0 - 53.0 %    MCV 93 78 - 100 fl    MCH 31.3 26.5 - 33.0 pg    MCHC 33.6 31.5 - 36.5 g/dL    RDW 12.0 10.0 - 15.0 %    Platelet Count 163 150 - 450 10e9/L    Diff Method Automated Method     % Neutrophils 81.1 %    % Lymphocytes 3.6 %    % Monocytes 14.9 %    % Eosinophils 0.0 %    % Basophils 0.1 %    % Immature Granulocytes 0.3 %    Absolute Neutrophil 14.4 (H) 1.6 - 8.3 10e9/L    Absolute Lymphocytes 0.6 (L) 0.8 - 5.3 10e9/L    Absolute Monocytes 2.7 (H) 0.0 - 1.3 10e9/L    Absolute Eosinophils 0.0 0.0 - 0.7 10e9/L    Absolute Basophils 0.0 0.0 - 0.2 10e9/L    Abs Immature Granulocytes 0.1 0 - 0.4 10e9/L   Comprehensive metabolic panel   Result Value Ref Range    Sodium 131 (L) 133 - 144 mmol/L    Potassium 3.7 3.4 - 5.3 mmol/L    Chloride 97 94 - 109 mmol/L    Carbon Dioxide 25 20 - 32 mmol/L    Anion Gap 9 3 - 14 mmol/L    Glucose 207 (H) 70 - 99 mg/dL    Urea Nitrogen 8 7 - 30 mg/dL    Creatinine 0.71 0.66 - 1.25 mg/dL    GFR Estimate >90 >60 mL/min/1.7m2    GFR Estimate If Black >90 >60 mL/min/1.7m2    Calcium 8.0 (L) 8.5 - 10.1 mg/dL    Bilirubin Total 1.0 0.2 - 1.3 mg/dL    Albumin 2.2 (L) 3.4 - 5.0 g/dL    Protein Total 6.5 (L) 6.8 - 8.8 g/dL    Alkaline Phosphatase 89 40 - 150 U/L     (H) 0 - 70 U/L    AST 59 (H) 0 - 45 U/L   CRP inflammation   Result Value Ref Range    CRP Inflammation 190.0 (H) 0.0 - 8.0 mg/L   Lactic acid whole blood   Result Value Ref Range    Lactic Acid 1.7 0.7 - 2.0 mmol/L     All imaging studies reviewed by me.         Impression and Plan:   Impression:   sebastien Callejas is a 42 year old male with a past medical history significant for DM type II who presents with a 5 day history of fevers, lola-anal pain and lola-anal swelling with leukocytosis and findings of perineal abscess with extension into scrotum on CT scan      Plan:   Admit to General surgery  NPO, mIVF  Received zosyn, clindamycin and imipenem at  outside hospital  Urology consulted given extension of abscess cavity into scrotum  OR for irrigation and debridement of perineal abscess, possible orchiectomy  Consent and preop complete        Debo Dumas MD  January 13, 2018       Debo Dumas MD  General Surgery Resident PGY-2  [id number]

## 2018-01-13 NOTE — ED PROVIDER NOTES
History     Chief Complaint   Patient presents with     Mass     HPI  Estevan Callejas is a 42 year old male who presented to the emergency room with his sister secondary to concerns of swelling and pain to the area just below his scrotum and into his buttock region.  The patient states that he was noted some swelling and tenderness to the area this last Monday or Tuesday.  He states he was seen in his clinic in [place], [place] on Wednesday.  He states that the physician at the clinic told him that they thought he was more having the symptoms because he was constipated.  Patient had been on some pain medications because of an injury to his right rib area after a fall on the ice that occurred a week ago Friday.  Patient states that the swelling and tenderness continued to get worse to the point where he felt he needed to come in today after developing a fever.  I asked patient if he is diabetic he states that he has been diabetic for several years.  I also asked about alcohol intake and he states that he drinks a couple in the evening daily.  I asked him what a couple was and he stated approximately 6-8 beers a night.  He states his last beer was last Friday and he has not had any alcohol this week.    Problem List:    Patient Active Problem List    Diagnosis Date Noted     Morbid obesity (H) 06/12/2017     Priority: Medium     Low back pain 03/06/2015     Priority: Medium     Type 2 diabetes, HbA1c goal < 7% (H) 02/25/2015     Priority: Medium     Peripheral neuropathy - near S2 dermatome 02/18/2015     Priority: Medium        Past Medical History:    Past Medical History:   Diagnosis Date     Diabetes (H)        Past Surgical History:    Past Surgical History:   Procedure Laterality Date     ORTHOPEDIC SURGERY       TONSILLECTOMY, ADENOIDECTOMY, COMBINED         Family History:    No family history on file.    Social History:  Marital Status:  Single [1]  Social History   Substance Use Topics     Smoking status:  Current Some Day Smoker     Types: Dip, chew, snus or snuff, Cigarettes     Smokeless tobacco: Current User     Alcohol use Yes        Medications:      oxyCODONE-acetaminophen (PERCOCET) 5-325 MG per tablet   metFORMIN (GLUCOPHAGE) 500 MG tablet   gabapentin (NEURONTIN) 300 MG capsule   aspirin 81 MG tablet   lisinopril (PRINIVIL/ZESTRIL) 2.5 MG tablet   blood glucose monitoring (NO BRAND SPECIFIED) test strip   blood glucose monitoring (NO BRAND SPECIFIED) meter device kit   blood glucose (NO BRAND SPECIFIED) lancets standard         Review of Systems   Constitutional: Positive for activity change, appetite change, chills, fatigue and fever.   Eyes: Negative.    Respiratory: Negative.    Cardiovascular: Negative.    Gastrointestinal: Positive for nausea. Negative for abdominal pain, blood in stool and vomiting.   Genitourinary: Positive for difficulty urinating (Patient is able to urinate but states it seems to be a little bit more difficult than usual.  He also states that his foreskin is swollen to the point where it is difficult seeing his penis.) and scrotal swelling.   Musculoskeletal: Negative.    Skin: Positive for rash.   Neurological: Negative.    Psychiatric/Behavioral: Negative.    All other systems reviewed and are negative.      Physical Exam   BP: 107/40  Pulse: 91  Temp: 100.6  F (38.1  C)  Resp: 22  Weight: 113.4 kg (250 lb)  SpO2: 99 %      Physical Exam   Constitutional: He is oriented to person, place, and time. He appears well-developed and well-nourished. He appears distressed.   HENT:   Head: Normocephalic and atraumatic.   Mouth/Throat: Oropharynx is clear and moist.   Eyes: Conjunctivae and EOM are normal. Pupils are equal, round, and reactive to light.   Neck: Normal range of motion. Neck supple.   Cardiovascular: Normal rate.    Pulmonary/Chest: Effort normal. No respiratory distress.   Abdominal: Soft. He exhibits no distension. There is no tenderness.   Genitourinary: Rectal exam shows  mass (Probable abscess - Fouriners gangene like appearance.) and tenderness.         Neurological: He is alert and oriented to person, place, and time.   Skin: Rash (Rash to perineal, scrotal, and perirectal area.) noted. He is not diaphoretic.   Psychiatric: He has a normal mood and affect. His behavior is normal. Judgment and thought content normal.   Nursing note and vitals reviewed.      ED Course     ED Course     Procedures                    Results for orders placed or performed during the hospital encounter of 01/13/18 (from the past 24 hour(s))   CBC with platelets differential   Result Value Ref Range    WBC 17.8 (H) 4.0 - 11.0 10e9/L    RBC Count 4.12 (L) 4.4 - 5.9 10e12/L    Hemoglobin 12.9 (L) 13.3 - 17.7 g/dL    Hematocrit 38.4 (L) 40.0 - 53.0 %    MCV 93 78 - 100 fl    MCH 31.3 26.5 - 33.0 pg    MCHC 33.6 31.5 - 36.5 g/dL    RDW 12.0 10.0 - 15.0 %    Platelet Count 163 150 - 450 10e9/L    Diff Method Automated Method     % Neutrophils 81.1 %    % Lymphocytes 3.6 %    % Monocytes 14.9 %    % Eosinophils 0.0 %    % Basophils 0.1 %    % Immature Granulocytes 0.3 %    Absolute Neutrophil 14.4 (H) 1.6 - 8.3 10e9/L    Absolute Lymphocytes 0.6 (L) 0.8 - 5.3 10e9/L    Absolute Monocytes 2.7 (H) 0.0 - 1.3 10e9/L    Absolute Eosinophils 0.0 0.0 - 0.7 10e9/L    Absolute Basophils 0.0 0.0 - 0.2 10e9/L    Abs Immature Granulocytes 0.1 0 - 0.4 10e9/L   Comprehensive metabolic panel   Result Value Ref Range    Sodium 131 (L) 133 - 144 mmol/L    Potassium 3.7 3.4 - 5.3 mmol/L    Chloride 97 94 - 109 mmol/L    Carbon Dioxide 25 20 - 32 mmol/L    Anion Gap 9 3 - 14 mmol/L    Glucose 207 (H) 70 - 99 mg/dL    Urea Nitrogen 8 7 - 30 mg/dL    Creatinine 0.71 0.66 - 1.25 mg/dL    GFR Estimate >90 >60 mL/min/1.7m2    GFR Estimate If Black >90 >60 mL/min/1.7m2    Calcium 8.0 (L) 8.5 - 10.1 mg/dL    Bilirubin Total 1.0 0.2 - 1.3 mg/dL    Albumin 2.2 (L) 3.4 - 5.0 g/dL    Protein Total 6.5 (L) 6.8 - 8.8 g/dL    Alkaline  Phosphatase 89 40 - 150 U/L     (H) 0 - 70 U/L    AST 59 (H) 0 - 45 U/L   CRP inflammation   Result Value Ref Range    CRP Inflammation 190.0 (H) 0.0 - 8.0 mg/L   Lactic acid whole blood   Result Value Ref Range    Lactic Acid 1.7 0.7 - 2.0 mmol/L   CT Abdomen Pelvis w Contrast    Narrative    CT ABDOMEN PELVIS W CONTRAST 1/13/2018 2:30 AM    HISTORY: Question Marley's gangrene.    COMPARISON: None.    TECHNIQUE:  Abdomen and pelvis CT was performed following intravenous  administration of 100mls Isovue 370.  Radiation dose for this scan was  reduced using automated exposure control, adjustment of the mA and/or  kV according to patient size, or iterative reconstruction technique.    FINDINGS: Old benign calcified right hilar lymph nodes and benign  granuloma in the right lung. Lung bases are otherwise clear.    Liver, gallbladder, spleen, pancreas, adrenal glands and kidneys  appear normal.    Normal caliber bowel. Normal appendix. No free air. No free or  loculated intraperitoneal fluid collection.    Urinary bladder is distended with normal wall thickness. No free fluid  in the pelvis.    There is a large fluid collection in the perineum with an air-fluid  level. This collection measures approximately 9.7 cm AP by 5.2 cm  transverse by 6.5 cm craniocaudal. In addition, gas and fluid are  evident extending up into the scrotum. Soft tissues of the proximal  legs appear normal.      Impression    IMPRESSION: Large perineal collection of fluid and gas with  inflammation characterized by both air and fluid extending into and  involving the scrotum.    RITIKA PADILLA MD     Medications ordered to be administered in the ER:    Medications   0.9% sodium chloride BOLUS (0 mLs Intravenous Stopped 1/13/18 0347)     Followed by   0.9% sodium chloride infusion (1,000 mLs Intravenous New Bag 1/13/18 0349)   HYDROmorphone (PF) (DILAUDID) injection 0.5 mg (0.5 mg Intravenous Given 1/13/18 2910)   ertapenem (INVanz) 1 g  vial to attach to  mL bag (1 g Intravenous New Bag 1/13/18 0351)   clindamycin (CLEOCIN) infusion 900 mg (not administered)   piperacillin-tazobactam (ZOSYN) infusion 3.375 g (0 g Intravenous Stopped 1/13/18 0239)   iopamidol (ISOVUE-370) solution 100 mL (100 mLs Intravenous Given 1/13/18 0210)   sodium chloride 0.9 % bag 500mL for CT scan flush use (60 mLs Intravenous Given 1/13/18 0211)   sodium chloride (PF) 0.9% PF flush 3 mL (10 mLs Intracatheter Given 1/13/18 0210)   LORazepam (ATIVAN) injection 0.5 mg (0.5 mg Intravenous Given 1/13/18 0318)       Assessments & Plan (with Medical Decision Making)  42-year-old male to the emergency room with concerns of scrotal pain and swelling that started on Tuesday this week.  He states he was seen in his clinic and they thought he had constipation.  Patient with exam findings here consistent with significant swelling to the scrotum and perineal area and to the perianal area suggestive of Marley's gangrene.  IV fluids and antibiotics initiated immediately with blood cultures drawn.  CT scan showed evidence for the large collection of fluid and gas with inflammation in the perineal area extending into the scrotum.  I discussed the need for emergent surgery with the patient.  I spoke to our on-call surgeon Dr. Lawson.  Dr. Garcia suggested the patient needs more specialty care than can be provided at this hospital and asked that I contact the [facility] to see if we can transfer his care.  I contacted Dr. Munroe at the [facility] who agreed to accept the patient in transfer to their hospital.  They wanted the patient to come through their ER and they plan to take him directly to surgery. Dr Clayton was also contacted from the [facility] emergency room and made aware of the plan per transfer.  He asked that we add clindamycin and ertapenem antibiotic doses prior to transfer.  EMTALA and transfer forms were filled out and signed. AVS was  printed and sent with the patient and EMS crew. The patient will be transported by ALS ambulance to the [facility].       I have reviewed the nursing notes.    I have reviewed the findings, diagnosis, plan and need for follow up with the patient.      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

## 2018-01-13 NOTE — BRIEF OP NOTE
[facility], [place]    Brief Operative Note    Pre-operative diagnosis: perianal abscess; carson's gangrene  Post-operative diagnosis Perineal abscess  Procedure: Procedure(s):  Irrigation and Debridement of perianal abscess - Wound Class: IV-Dirty or Infected  Surgeon: Surgeon(s) and Role:     * Stefania Munroe MD - Primary     * Minerva Irizarry MD - Resident - Assisting     * Tru Lopez MD - Assisting  Anesthesia: General   Estimated blood loss: 100 cc  Drains: None  Specimens:   ID Type Source Tests Collected by Time Destination   1 : Perineal Abscess Fluid Other ANAEROBIC BACTERIAL CULTURE, FLUID CULTURE AEROBIC BACTERIAL, FUNGUS CULTURE Stefania Munroe MD 1/13/2018  8:55 AM      Findings:   Fouls smelling perineal cavity with necrotic tissue. No obvious rectal communication..  Complications: None.  Implants: None.

## 2018-01-14 ENCOUNTER — ANESTHESIA (OUTPATIENT)
Dept: SURGERY | Facility: CLINIC | Age: 43
End: 2018-01-14
Payer: COMMERCIAL

## 2018-01-14 LAB
ANION GAP SERPL CALCULATED.3IONS-SCNC: 7 MMOL/L (ref 3–14)
BUN SERPL-MCNC: 8 MG/DL (ref 7–30)
CALCIUM SERPL-MCNC: 7.8 MG/DL (ref 8.5–10.1)
CHLORIDE SERPL-SCNC: 104 MMOL/L (ref 94–109)
CO2 SERPL-SCNC: 25 MMOL/L (ref 20–32)
CREAT SERPL-MCNC: 0.57 MG/DL (ref 0.66–1.25)
ERYTHROCYTE [DISTWIDTH] IN BLOOD BY AUTOMATED COUNT: 12.7 % (ref 10–15)
GFR SERPL CREATININE-BSD FRML MDRD: >90 ML/MIN/1.7M2
GLUCOSE BLDC GLUCOMTR-MCNC: 104 MG/DL (ref 70–99)
GLUCOSE SERPL-MCNC: 128 MG/DL (ref 70–99)
HCT VFR BLD AUTO: 33.1 % (ref 40–53)
HGB BLD-MCNC: 10.8 G/DL (ref 13.3–17.7)
MCH RBC QN AUTO: 31.4 PG (ref 26.5–33)
MCHC RBC AUTO-ENTMCNC: 32.6 G/DL (ref 31.5–36.5)
MCV RBC AUTO: 96 FL (ref 78–100)
PLATELET # BLD AUTO: 163 10E9/L (ref 150–450)
POTASSIUM SERPL-SCNC: 3.3 MMOL/L (ref 3.4–5.3)
RBC # BLD AUTO: 3.44 10E12/L (ref 4.4–5.9)
SODIUM SERPL-SCNC: 137 MMOL/L (ref 133–144)
WBC # BLD AUTO: 12.9 10E9/L (ref 4–11)

## 2018-01-14 PROCEDURE — 12000008 ZZH R&B INTERMEDIATE UMMC

## 2018-01-14 PROCEDURE — C9399 UNCLASSIFIED DRUGS OR BIOLOG: HCPCS

## 2018-01-14 PROCEDURE — 25000125 ZZHC RX 250: Performed by: STUDENT IN AN ORGANIZED HEALTH CARE EDUCATION/TRAINING PROGRAM

## 2018-01-14 PROCEDURE — 25000128 H RX IP 250 OP 636: Performed by: STUDENT IN AN ORGANIZED HEALTH CARE EDUCATION/TRAINING PROGRAM

## 2018-01-14 PROCEDURE — 71000014 ZZH RECOVERY PHASE 1 LEVEL 2 FIRST HR: Performed by: SURGERY

## 2018-01-14 PROCEDURE — 25000132 ZZH RX MED GY IP 250 OP 250 PS 637: Performed by: STUDENT IN AN ORGANIZED HEALTH CARE EDUCATION/TRAINING PROGRAM

## 2018-01-14 PROCEDURE — 36415 COLL VENOUS BLD VENIPUNCTURE: CPT | Performed by: STUDENT IN AN ORGANIZED HEALTH CARE EDUCATION/TRAINING PROGRAM

## 2018-01-14 PROCEDURE — 37000008 ZZH ANESTHESIA TECHNICAL FEE, 1ST 30 MIN: Performed by: SURGERY

## 2018-01-14 PROCEDURE — 00000146 ZZHCL STATISTIC GLUCOSE BY METER IP

## 2018-01-14 PROCEDURE — 25000125 ZZHC RX 250

## 2018-01-14 PROCEDURE — 40000170 ZZH STATISTIC PRE-PROCEDURE ASSESSMENT II: Performed by: SURGERY

## 2018-01-14 PROCEDURE — 80048 BASIC METABOLIC PNL TOTAL CA: CPT | Performed by: STUDENT IN AN ORGANIZED HEALTH CARE EDUCATION/TRAINING PROGRAM

## 2018-01-14 PROCEDURE — 0HD9XZZ EXTRACTION OF PERINEUM SKIN, EXTERNAL APPROACH: ICD-10-PCS | Performed by: SURGERY

## 2018-01-14 PROCEDURE — 36000051 ZZH SURGERY LEVEL 2 1ST 30 MIN - UMMC: Performed by: SURGERY

## 2018-01-14 PROCEDURE — 27210794 ZZH OR GENERAL SUPPLY STERILE: Performed by: SURGERY

## 2018-01-14 PROCEDURE — 71000015 ZZH RECOVERY PHASE 1 LEVEL 2 EA ADDTL HR: Performed by: SURGERY

## 2018-01-14 PROCEDURE — 85027 COMPLETE CBC AUTOMATED: CPT | Performed by: STUDENT IN AN ORGANIZED HEALTH CARE EDUCATION/TRAINING PROGRAM

## 2018-01-14 PROCEDURE — 36000053 ZZH SURGERY LEVEL 2 EA 15 ADDTL MIN - UMMC: Performed by: SURGERY

## 2018-01-14 PROCEDURE — 25000128 H RX IP 250 OP 636

## 2018-01-14 PROCEDURE — 25000565 ZZH ISOFLURANE, EA 15 MIN: Performed by: SURGERY

## 2018-01-14 RX ORDER — LABETALOL HYDROCHLORIDE 5 MG/ML
10 INJECTION, SOLUTION INTRAVENOUS
Status: DISCONTINUED | OUTPATIENT
Start: 2018-01-14 | End: 2018-01-14 | Stop reason: HOSPADM

## 2018-01-14 RX ORDER — POTASSIUM CHLORIDE 29.8 MG/ML
20 INJECTION INTRAVENOUS
Status: DISCONTINUED | OUTPATIENT
Start: 2018-01-14 | End: 2018-01-14

## 2018-01-14 RX ORDER — NALOXONE HYDROCHLORIDE 0.4 MG/ML
.1-.4 INJECTION, SOLUTION INTRAMUSCULAR; INTRAVENOUS; SUBCUTANEOUS
Status: ACTIVE | OUTPATIENT
Start: 2018-01-14 | End: 2018-01-15

## 2018-01-14 RX ORDER — SODIUM CHLORIDE, SODIUM LACTATE, POTASSIUM CHLORIDE, CALCIUM CHLORIDE 600; 310; 30; 20 MG/100ML; MG/100ML; MG/100ML; MG/100ML
INJECTION, SOLUTION INTRAVENOUS CONTINUOUS PRN
Status: DISCONTINUED | OUTPATIENT
Start: 2018-01-14 | End: 2018-01-14

## 2018-01-14 RX ORDER — LIDOCAINE HYDROCHLORIDE 20 MG/ML
INJECTION, SOLUTION INFILTRATION; PERINEURAL PRN
Status: DISCONTINUED | OUTPATIENT
Start: 2018-01-14 | End: 2018-01-14

## 2018-01-14 RX ORDER — ONDANSETRON 4 MG/1
4 TABLET, ORALLY DISINTEGRATING ORAL EVERY 30 MIN PRN
Status: DISCONTINUED | OUTPATIENT
Start: 2018-01-14 | End: 2018-01-14 | Stop reason: HOSPADM

## 2018-01-14 RX ORDER — POTASSIUM CL/LIDO/0.9 % NACL 10MEQ/0.1L
10 INTRAVENOUS SOLUTION, PIGGYBACK (ML) INTRAVENOUS
Status: DISCONTINUED | OUTPATIENT
Start: 2018-01-14 | End: 2018-01-14

## 2018-01-14 RX ORDER — PROPOFOL 10 MG/ML
INJECTION, EMULSION INTRAVENOUS PRN
Status: DISCONTINUED | OUTPATIENT
Start: 2018-01-14 | End: 2018-01-14

## 2018-01-14 RX ORDER — ONDANSETRON 2 MG/ML
4 INJECTION INTRAMUSCULAR; INTRAVENOUS EVERY 30 MIN PRN
Status: DISCONTINUED | OUTPATIENT
Start: 2018-01-14 | End: 2018-01-14 | Stop reason: HOSPADM

## 2018-01-14 RX ORDER — SODIUM CHLORIDE, SODIUM LACTATE, POTASSIUM CHLORIDE, CALCIUM CHLORIDE 600; 310; 30; 20 MG/100ML; MG/100ML; MG/100ML; MG/100ML
INJECTION, SOLUTION INTRAVENOUS CONTINUOUS
Status: DISCONTINUED | OUTPATIENT
Start: 2018-01-14 | End: 2018-01-14 | Stop reason: HOSPADM

## 2018-01-14 RX ORDER — CLINDAMYCIN PHOSPHATE 900 MG/50ML
900 INJECTION, SOLUTION INTRAVENOUS EVERY 8 HOURS
Status: DISCONTINUED | OUTPATIENT
Start: 2018-01-14 | End: 2018-01-18 | Stop reason: HOSPADM

## 2018-01-14 RX ORDER — POTASSIUM CHLORIDE 750 MG/1
20-40 TABLET, EXTENDED RELEASE ORAL
Status: DISCONTINUED | OUTPATIENT
Start: 2018-01-14 | End: 2018-01-18 | Stop reason: HOSPADM

## 2018-01-14 RX ORDER — POTASSIUM CHLORIDE 7.45 MG/ML
10 INJECTION INTRAVENOUS
Status: DISCONTINUED | OUTPATIENT
Start: 2018-01-14 | End: 2018-01-14

## 2018-01-14 RX ORDER — POTASSIUM CHLORIDE 1.5 G/1.58G
20-40 POWDER, FOR SOLUTION ORAL
Status: DISCONTINUED | OUTPATIENT
Start: 2018-01-14 | End: 2018-01-18 | Stop reason: HOSPADM

## 2018-01-14 RX ORDER — ONDANSETRON 2 MG/ML
INJECTION INTRAMUSCULAR; INTRAVENOUS PRN
Status: DISCONTINUED | OUTPATIENT
Start: 2018-01-14 | End: 2018-01-14

## 2018-01-14 RX ORDER — FENTANYL CITRATE 50 UG/ML
25-50 INJECTION, SOLUTION INTRAMUSCULAR; INTRAVENOUS
Status: DISCONTINUED | OUTPATIENT
Start: 2018-01-14 | End: 2018-01-14 | Stop reason: HOSPADM

## 2018-01-14 RX ADMIN — ACETAMINOPHEN 975 MG: 325 TABLET ORAL at 02:10

## 2018-01-14 RX ADMIN — ASPIRIN 81 MG CHEWABLE TABLET 81 MG: 81 TABLET CHEWABLE at 13:28

## 2018-01-14 RX ADMIN — PIPERACILLIN SODIUM AND TAZOBACTAM SODIUM 3.38 G: 36; 4.5 INJECTION, POWDER, LYOPHILIZED, FOR SOLUTION INTRAVENOUS at 00:01

## 2018-01-14 RX ADMIN — OXYCODONE HYDROCHLORIDE 5 MG: 5 TABLET ORAL at 17:17

## 2018-01-14 RX ADMIN — FENTANYL CITRATE 25 MCG: 50 INJECTION, SOLUTION INTRAMUSCULAR; INTRAVENOUS at 11:08

## 2018-01-14 RX ADMIN — Medication 80 MG: at 10:24

## 2018-01-14 RX ADMIN — HYDROMORPHONE HYDROCHLORIDE 0.5 MG: 1 INJECTION, SOLUTION INTRAMUSCULAR; INTRAVENOUS; SUBCUTANEOUS at 11:34

## 2018-01-14 RX ADMIN — PIPERACILLIN SODIUM AND TAZOBACTAM SODIUM 3.38 G: 36; 4.5 INJECTION, POWDER, LYOPHILIZED, FOR SOLUTION INTRAVENOUS at 06:18

## 2018-01-14 RX ADMIN — ENOXAPARIN SODIUM 40 MG: 40 INJECTION SUBCUTANEOUS at 17:17

## 2018-01-14 RX ADMIN — PROPOFOL 360 MG: 10 INJECTION, EMULSION INTRAVENOUS at 10:24

## 2018-01-14 RX ADMIN — PIPERACILLIN SODIUM AND TAZOBACTAM SODIUM 3.38 G: 36; 4.5 INJECTION, POWDER, LYOPHILIZED, FOR SOLUTION INTRAVENOUS at 19:24

## 2018-01-14 RX ADMIN — POTASSIUM CHLORIDE 40 MEQ: 750 TABLET, EXTENDED RELEASE ORAL at 18:24

## 2018-01-14 RX ADMIN — SENNOSIDES AND DOCUSATE SODIUM 1 TABLET: 8.6; 5 TABLET ORAL at 13:29

## 2018-01-14 RX ADMIN — ACETAMINOPHEN 975 MG: 325 TABLET ORAL at 13:35

## 2018-01-14 RX ADMIN — CLINDAMYCIN PHOSPHATE 900 MG: 18 INJECTION, SOLUTION INTRAVENOUS at 04:20

## 2018-01-14 RX ADMIN — SENNOSIDES AND DOCUSATE SODIUM 1 TABLET: 8.6; 5 TABLET ORAL at 20:12

## 2018-01-14 RX ADMIN — FENTANYL CITRATE 25 MCG: 50 INJECTION, SOLUTION INTRAMUSCULAR; INTRAVENOUS at 10:57

## 2018-01-14 RX ADMIN — FENTANYL CITRATE 50 MCG: 50 INJECTION, SOLUTION INTRAMUSCULAR; INTRAVENOUS at 11:25

## 2018-01-14 RX ADMIN — CLINDAMYCIN PHOSPHATE 900 MG: 18 INJECTION, SOLUTION INTRAVENOUS at 13:29

## 2018-01-14 RX ADMIN — OXYCODONE HYDROCHLORIDE 5 MG: 5 TABLET ORAL at 02:26

## 2018-01-14 RX ADMIN — LIDOCAINE HYDROCHLORIDE 80 MG: 20 INJECTION, SOLUTION INFILTRATION; PERINEURAL at 10:24

## 2018-01-14 RX ADMIN — HYDROMORPHONE HYDROCHLORIDE 0.5 MG: 1 INJECTION, SOLUTION INTRAMUSCULAR; INTRAVENOUS; SUBCUTANEOUS at 11:53

## 2018-01-14 RX ADMIN — HYDROMORPHONE HYDROCHLORIDE 0.5 MG: 1 INJECTION, SOLUTION INTRAMUSCULAR; INTRAVENOUS; SUBCUTANEOUS at 11:48

## 2018-01-14 RX ADMIN — SODIUM CHLORIDE, POTASSIUM CHLORIDE, SODIUM LACTATE AND CALCIUM CHLORIDE: 600; 310; 30; 20 INJECTION, SOLUTION INTRAVENOUS at 02:09

## 2018-01-14 RX ADMIN — SODIUM CHLORIDE, POTASSIUM CHLORIDE, SODIUM LACTATE AND CALCIUM CHLORIDE: 600; 310; 30; 20 INJECTION, SOLUTION INTRAVENOUS at 10:19

## 2018-01-14 RX ADMIN — OXYCODONE HYDROCHLORIDE 5 MG: 5 TABLET ORAL at 13:27

## 2018-01-14 RX ADMIN — ROCURONIUM BROMIDE 20 MG: 10 INJECTION INTRAVENOUS at 10:34

## 2018-01-14 RX ADMIN — GABAPENTIN 300 MG: 300 CAPSULE ORAL at 13:28

## 2018-01-14 RX ADMIN — POTASSIUM CHLORIDE 20 MEQ: 750 TABLET, EXTENDED RELEASE ORAL at 21:29

## 2018-01-14 RX ADMIN — SUGAMMADEX 400 MG: 100 INJECTION, SOLUTION INTRAVENOUS at 10:39

## 2018-01-14 RX ADMIN — METFORMIN HYDROCHLORIDE 1000 MG: 500 TABLET ORAL at 13:28

## 2018-01-14 RX ADMIN — HYDROMORPHONE HYDROCHLORIDE 0.5 MG: 1 INJECTION, SOLUTION INTRAMUSCULAR; INTRAVENOUS; SUBCUTANEOUS at 11:41

## 2018-01-14 RX ADMIN — ONDANSETRON 4 MG: 2 INJECTION INTRAMUSCULAR; INTRAVENOUS at 10:40

## 2018-01-14 RX ADMIN — CLINDAMYCIN PHOSPHATE 900 MG: 18 INJECTION, SOLUTION INTRAVENOUS at 20:11

## 2018-01-14 RX ADMIN — ACETAMINOPHEN 975 MG: 325 TABLET ORAL at 20:11

## 2018-01-14 NOTE — OP NOTE
DATE OF SURGERY: 1/14/2018    PREOPERATIVE DIAGNOSIS:  Perineal abscess.       POSTOPERATIVE DIAGNOSIS:  Necrotizing soft tissue infection of perineal abscess.     PROCEDURE: Dressing change     SURGEONS:   Stefania Munroe MD    RESIDENT:    Minerva Irizarry MD.    FINDINGS:  Moderate sized perineal cavity with minor residual purulent fluid and necrotic tissue. No debridement performed. Minor 1 cm skin necrosis on left side. Continued cellulitis.       ESTIMATED BLOOD LOSS:  5 mL.       COMPLICATIONS:  None.       SPECIMEN:  none      DRAINS:  None.     PROCEDURE: The patient was brought to the operating room and placed supine on the operating table.  General endotracheal anesthesia was achieved.  The patient was then placed in the lithotomy position.     The dressing was removed. The wound appeared as stated above. The small area of necrotic skin was debrided. The cavity was packed with moistened roll of kerlix. Covered with kerlix. He was extubated and taken to the PACU for recovery.    The patient will be returned to the operating room tomorrow for dressing change under MAC.

## 2018-01-14 NOTE — PROGRESS NOTES
At 1240 pt returned to the unit, alert and oriented x4. He alexandrea pain. Started frequent vital signs. Will continue to monitor.

## 2018-01-14 NOTE — PROGRESS NOTES
Urology Brief note       Patient seen with Dr Lopez this AM. Pain well controlled, afebrile and WBC trending down. Scheduled with Gen surg for I&D  Exam wasn't done this AM  No changed from urology stand point, appreciate Gen surgery care  Urology will follow peripherally, please call if you have questions/concerns      Patient seen with Dr John Lee MD  Urology PGY3

## 2018-01-14 NOTE — PLAN OF CARE
"Problem: Patient Care Overview  Goal: Plan of Care/Patient Progress Review  Outcome: No Change  1930-2003  Afebrile. VSS, on room air. PRN Oxy 5mg given x1 overnight with relief; patient did express feeling \"a little off\" after administration. Discussed using minimal doses of narcotics for pain management, patient agrees to this plan. PIV with LR infusing, IV abx given. Peterson with adequate urine output. Surgical site with foul, brown drainage; ABD changed. Plan to return to OR today, orders received late this AM - patient had minimal intake overnight. Continue to monitor and follow POC.       "

## 2018-01-14 NOTE — ANESTHESIA POSTPROCEDURE EVALUATION
Patient: Estevan Callejas    Procedure(s):  combined irrigation and debridement perineal and dressing change - Wound Class: II-Clean Contaminated    Diagnosis:perianal abscess; carson's gangrene  Diagnosis Additional Information: No value filed.    Anesthesia Type:  General, ETT    Note:  Anesthesia Post Evaluation    Patient location during evaluation: PACU  Patient participation: Able to fully participate in evaluation  Level of consciousness: awake  Pain management: adequate  Airway patency: patent  Cardiovascular status: acceptable  Respiratory status: acceptable  Hydration status: acceptable  PONV: none             Last vitals:  Vitals:    01/14/18 0825 01/14/18 1050 01/14/18 1105   BP: 130/69 115/75 119/80   Pulse:      Resp: 18 15 16   Temp: 36.9  C (98.5  F) 36.9  C (98.4  F)    SpO2: 96% 96%          Electronically Signed By: Johnny Husain MD  January 14, 2018  11:35 AM

## 2018-01-14 NOTE — PLAN OF CARE
Problem: Patient Care Overview  Goal: Plan of Care/Patient Progress Review  Outcome: No Change  A&O. VSS on RA. Pt declines capno. Pain managed well w/ tylenol. Wound dressing saturated w/ brown drainage. Part of 1 piece of packing fell out and was cut by writer, reinforced w/ abd. Had small BM reported by pt. Ambulated in halls x1, gustavo good. Continue with POC.

## 2018-01-14 NOTE — OP NOTE
DATE OF SURGERY:  01/13/2018      PREOPERATIVE DIAGNOSIS:  Perineal abscess.       POSTOPERATIVE DIAGNOSIS:  Necrotizing soft tissue infection of perineal abscess.       PROCEDURE:  Incision and debridement of perineal abscess.     SURGEONS:   1.  Stefania Munroe MD, primary.   2.  Tru Lopez MD.      RESIDENT:    Minerva Irizarry MD.        FINDINGS:  Moderate sized perineal cavity with foul-smelling abscess and necrotic tissue.  It does not involve testicles or rectum.      ESTIMATED BLOOD LOSS:  100 mL.      COMPLICATIONS:  None.      SPECIMEN:  Abscess fluid for culture.      DRAINS:  None.      INDICATIONS FOR PROCEDURE:  Estevan Callejas is a 42-year-old gentleman with diabetes who presented with about 5 days of perineal pain.  A CT scan was obtained that showed significant perineal abscess with air-fluid levels extending into the scrotum.  The risks and benefits of the operation were discussed with patient.  The risks include bleeding, continued infection requiring extensive debridement, heart complications, lung complications and prolonged ICU stay.  The patient agreed to the operation.      PROCEDURE IN DETAIL:  The patient was brought to the operating room and placed supine on the operating table.  General endotracheal anesthesia was achieved.  The patient was then placed in the lithotomy position.  A Peterson catheter was inserted.  The perineum was then prepped and draped in the usual sterile fashion.      We began by making an incision in the midline in the perineum.  We extended the incision past the skin and the dermis into the subcutaneous tissue with evacuation of pressurized abscess pocket.  This was quite foul-smelling and necrotic.  We extended our incision anterior just prior to entering into the scrotal sac.  This cavity was debrided widely.  We did not have to excise any skin as the skin appeared viable.  We did multiple rectal exams and could not find any connection to the rectum.  The  mucosa of the rectum was not indurated and there was no fluctuance or evidence of involvement of the rectum.  We could see the muscles surrounding the urethra in our dissection but this was not entered. We debrided as much necrotic tissue as we can down to healthy tissue with robust bleeding.  We ensured hemostasis.  We then used the Pulsavac on this wound with 3 L of saline.  The wound appeared clean.        Dr. Lopez scrubbed at this point just to evaluate the wound.  He did not add any further debridement.      The wound was packed with Betadine-soaked gauze.  The patient will be returned to the operating room tomorrow for continued debridement.      ZAID GUAN MD       As dictated by REJI LEONG MD            D: 2018 16:15   T: 2018 16:44   MT: md      Name:     JEREMI LUCAS   MRN:      [id number]        Account:        [id number]   :      1975           Procedure Date: 2018      Document: [id number]

## 2018-01-14 NOTE — ANESTHESIA CARE TRANSFER NOTE
Patient: Estevan Callejas    Procedure(s):  combined irrigation and debridement perineal and dressing change - Wound Class: II-Clean Contaminated    Diagnosis: perianal abscess; carson's gangrene  Diagnosis Additional Information: No value filed.    Anesthesia Type:   General, ETT     Note:  Airway :Face Mask  Patient transferred to:PACU  Comments: VSS. Patient satting well on simple face mask with his pain well controlled. He currently denies any nausea or vomiting. Will continue to monitor in PACU for anesthesia to clear.   Handoff Report: Identifed the Patient, Identified the Reponsible Provider, Reviewed the pertinent medical history, Discussed the surgical course, Reviewed Intra-OP anesthesia mangement and issues during anesthesia, Set expectations for post-procedure period and Allowed opportunity for questions and acknowledgement of understanding      Vitals: (Last set prior to Anesthesia Care Transfer)    CRNA VITALS  1/14/2018 1015 - 1/14/2018 1053      1/14/2018             Pulse: 92    SpO2: 97 %                Electronically Signed By: Oswaldo Duffy MD  January 14, 2018  10:53 AM

## 2018-01-14 NOTE — PROGRESS NOTES
"/69  Pulse 71  Temp 98.5  F (36.9  C) (Oral)  Resp 18  Ht 1.753 m (5' 9\")  Wt 113.4 kg (250 lb)  SpO2 96%  BMI 36.92 kg/m2    At 0800 writer gave report to OR nurse and did preop checklist.  At 0830 pt was sent to OR with transport.  "

## 2018-01-14 NOTE — PLAN OF CARE
"Problem: Patient Care Overview  Goal: Plan of Care/Patient Progress Review  Outcome: Improving  /70  Pulse 71  Temp 96.7  F (35.9  C) (Axillary)  Resp 16  Ht 1.753 m (5' 9\")  Wt 113.4 kg (250 lb)  SpO2 96%  BMI 36.92 kg/m2    Neuro : Alert and oriented x4.  VS : Avss.  GI/ : Pt had one soft bowel movement/The gerber is patent and adequate.  Diet/appetite :  Consistent carb [id number] calories/Good.  Activity : Up with assist.   Pain : Oxycodone for perineal pain with relief.  Skin :  Pt's skin is intact wit the exception of the perineal wound, which he went to the O R this am for irrigation and debridement and dressing change, upon arrival the ABD pad and pad was changed, packing intact.  Line : PIV saline locked.  Plan : Pt will return to O.R tomorrow for dressing change.           "

## 2018-01-15 ENCOUNTER — ANESTHESIA (OUTPATIENT)
Dept: SURGERY | Facility: CLINIC | Age: 43
End: 2018-01-15
Payer: COMMERCIAL

## 2018-01-15 ENCOUNTER — ANESTHESIA EVENT (OUTPATIENT)
Dept: SURGERY | Facility: CLINIC | Age: 43
End: 2018-01-15
Payer: COMMERCIAL

## 2018-01-15 LAB
ANION GAP SERPL CALCULATED.3IONS-SCNC: 9 MMOL/L (ref 3–14)
BUN SERPL-MCNC: 7 MG/DL (ref 7–30)
CALCIUM SERPL-MCNC: 7.8 MG/DL (ref 8.5–10.1)
CHLORIDE SERPL-SCNC: 106 MMOL/L (ref 94–109)
CO2 SERPL-SCNC: 25 MMOL/L (ref 20–32)
CREAT SERPL-MCNC: 0.55 MG/DL (ref 0.66–1.25)
ERYTHROCYTE [DISTWIDTH] IN BLOOD BY AUTOMATED COUNT: 12.7 % (ref 10–15)
GFR SERPL CREATININE-BSD FRML MDRD: >90 ML/MIN/1.7M2
GLUCOSE BLDC GLUCOMTR-MCNC: 104 MG/DL (ref 70–99)
GLUCOSE BLDC GLUCOMTR-MCNC: 199 MG/DL (ref 70–99)
GLUCOSE SERPL-MCNC: 144 MG/DL (ref 70–99)
HCT VFR BLD AUTO: 33.9 % (ref 40–53)
HGB BLD-MCNC: 10.9 G/DL (ref 13.3–17.7)
MCH RBC QN AUTO: 31.1 PG (ref 26.5–33)
MCHC RBC AUTO-ENTMCNC: 32.2 G/DL (ref 31.5–36.5)
MCV RBC AUTO: 97 FL (ref 78–100)
PLATELET # BLD AUTO: 245 10E9/L (ref 150–450)
POTASSIUM SERPL-SCNC: 3.5 MMOL/L (ref 3.4–5.3)
POTASSIUM SERPL-SCNC: 3.8 MMOL/L (ref 3.4–5.3)
RBC # BLD AUTO: 3.5 10E12/L (ref 4.4–5.9)
SODIUM SERPL-SCNC: 140 MMOL/L (ref 133–144)
WBC # BLD AUTO: 10.9 10E9/L (ref 4–11)

## 2018-01-15 PROCEDURE — 25000125 ZZHC RX 250: Performed by: SURGERY

## 2018-01-15 PROCEDURE — 36415 COLL VENOUS BLD VENIPUNCTURE: CPT | Performed by: SURGERY

## 2018-01-15 PROCEDURE — 25000128 H RX IP 250 OP 636: Performed by: STUDENT IN AN ORGANIZED HEALTH CARE EDUCATION/TRAINING PROGRAM

## 2018-01-15 PROCEDURE — 00000146 ZZHCL STATISTIC GLUCOSE BY METER IP

## 2018-01-15 PROCEDURE — 25000125 ZZHC RX 250: Performed by: NURSE ANESTHETIST, CERTIFIED REGISTERED

## 2018-01-15 PROCEDURE — 84132 ASSAY OF SERUM POTASSIUM: CPT | Performed by: SURGERY

## 2018-01-15 PROCEDURE — 36000051 ZZH SURGERY LEVEL 2 1ST 30 MIN - UMMC: Performed by: SURGERY

## 2018-01-15 PROCEDURE — 40000170 ZZH STATISTIC PRE-PROCEDURE ASSESSMENT II: Performed by: SURGERY

## 2018-01-15 PROCEDURE — 0HQ9XZZ REPAIR PERINEUM SKIN, EXTERNAL APPROACH: ICD-10-PCS | Performed by: SURGERY

## 2018-01-15 PROCEDURE — 25000128 H RX IP 250 OP 636: Performed by: SURGERY

## 2018-01-15 PROCEDURE — 85027 COMPLETE CBC AUTOMATED: CPT | Performed by: SURGERY

## 2018-01-15 PROCEDURE — 12000008 ZZH R&B INTERMEDIATE UMMC

## 2018-01-15 PROCEDURE — 37000009 ZZH ANESTHESIA TECHNICAL FEE, EACH ADDTL 15 MIN: Performed by: SURGERY

## 2018-01-15 PROCEDURE — 36000053 ZZH SURGERY LEVEL 2 EA 15 ADDTL MIN - UMMC: Performed by: SURGERY

## 2018-01-15 PROCEDURE — 80048 BASIC METABOLIC PNL TOTAL CA: CPT | Performed by: SURGERY

## 2018-01-15 PROCEDURE — 25000128 H RX IP 250 OP 636: Performed by: NURSE ANESTHETIST, CERTIFIED REGISTERED

## 2018-01-15 PROCEDURE — E2402 NEG PRESS WOUND THERAPY PUMP: HCPCS

## 2018-01-15 PROCEDURE — 37000008 ZZH ANESTHESIA TECHNICAL FEE, 1ST 30 MIN: Performed by: SURGERY

## 2018-01-15 PROCEDURE — 25000125 ZZHC RX 250: Performed by: STUDENT IN AN ORGANIZED HEALTH CARE EDUCATION/TRAINING PROGRAM

## 2018-01-15 PROCEDURE — 27210794 ZZH OR GENERAL SUPPLY STERILE: Performed by: SURGERY

## 2018-01-15 PROCEDURE — 25000132 ZZH RX MED GY IP 250 OP 250 PS 637: Performed by: ANESTHESIOLOGY

## 2018-01-15 PROCEDURE — 25000132 ZZH RX MED GY IP 250 OP 250 PS 637: Performed by: STUDENT IN AN ORGANIZED HEALTH CARE EDUCATION/TRAINING PROGRAM

## 2018-01-15 RX ORDER — HYDRALAZINE HYDROCHLORIDE 20 MG/ML
INJECTION INTRAMUSCULAR; INTRAVENOUS PRN
Status: DISCONTINUED | OUTPATIENT
Start: 2018-01-15 | End: 2018-01-15

## 2018-01-15 RX ORDER — LEVOFLOXACIN 5 MG/ML
500 INJECTION, SOLUTION INTRAVENOUS EVERY 24 HOURS
Status: DISCONTINUED | OUTPATIENT
Start: 2018-01-15 | End: 2018-01-18 | Stop reason: HOSPADM

## 2018-01-15 RX ORDER — LIDOCAINE HYDROCHLORIDE 20 MG/ML
INJECTION, SOLUTION INFILTRATION; PERINEURAL PRN
Status: DISCONTINUED | OUTPATIENT
Start: 2018-01-15 | End: 2018-01-15

## 2018-01-15 RX ORDER — FENTANYL CITRATE 50 UG/ML
INJECTION, SOLUTION INTRAMUSCULAR; INTRAVENOUS PRN
Status: DISCONTINUED | OUTPATIENT
Start: 2018-01-15 | End: 2018-01-15

## 2018-01-15 RX ORDER — SODIUM CHLORIDE, SODIUM LACTATE, POTASSIUM CHLORIDE, CALCIUM CHLORIDE 600; 310; 30; 20 MG/100ML; MG/100ML; MG/100ML; MG/100ML
INJECTION, SOLUTION INTRAVENOUS CONTINUOUS
Status: DISCONTINUED | OUTPATIENT
Start: 2018-01-15 | End: 2018-01-15

## 2018-01-15 RX ORDER — PROPOFOL 10 MG/ML
INJECTION, EMULSION INTRAVENOUS CONTINUOUS PRN
Status: DISCONTINUED | OUTPATIENT
Start: 2018-01-15 | End: 2018-01-15

## 2018-01-15 RX ORDER — LIDOCAINE 40 MG/G
CREAM TOPICAL
Status: DISCONTINUED | OUTPATIENT
Start: 2018-01-15 | End: 2018-01-18 | Stop reason: HOSPADM

## 2018-01-15 RX ORDER — NICOTINE POLACRILEX 4 MG
15-30 LOZENGE BUCCAL
Status: DISCONTINUED | OUTPATIENT
Start: 2018-01-15 | End: 2018-01-16

## 2018-01-15 RX ORDER — HYDROMORPHONE HYDROCHLORIDE 2 MG/1
2 TABLET ORAL
Status: DISCONTINUED | OUTPATIENT
Start: 2018-01-15 | End: 2018-01-15

## 2018-01-15 RX ORDER — LIDOCAINE HYDROCHLORIDE 10 MG/ML
INJECTION, SOLUTION EPIDURAL; INFILTRATION; INTRACAUDAL; PERINEURAL PRN
Status: DISCONTINUED | OUTPATIENT
Start: 2018-01-15 | End: 2018-01-15 | Stop reason: HOSPADM

## 2018-01-15 RX ORDER — KETAMINE HYDROCHLORIDE 10 MG/ML
INJECTION, SOLUTION INTRAMUSCULAR; INTRAVENOUS PRN
Status: DISCONTINUED | OUTPATIENT
Start: 2018-01-15 | End: 2018-01-15

## 2018-01-15 RX ORDER — DEXTROSE MONOHYDRATE 25 G/50ML
25-50 INJECTION, SOLUTION INTRAVENOUS
Status: DISCONTINUED | OUTPATIENT
Start: 2018-01-15 | End: 2018-01-16

## 2018-01-15 RX ORDER — HYDROMORPHONE HYDROCHLORIDE 1 MG/ML
0.5 INJECTION, SOLUTION INTRAMUSCULAR; INTRAVENOUS; SUBCUTANEOUS
Status: DISCONTINUED | OUTPATIENT
Start: 2018-01-15 | End: 2018-01-15

## 2018-01-15 RX ADMIN — PIPERACILLIN SODIUM AND TAZOBACTAM SODIUM 3.38 G: 36; 4.5 INJECTION, POWDER, LYOPHILIZED, FOR SOLUTION INTRAVENOUS at 00:40

## 2018-01-15 RX ADMIN — PROPOFOL 50 MCG/KG/MIN: 10 INJECTION, EMULSION INTRAVENOUS at 12:03

## 2018-01-15 RX ADMIN — HYDROMORPHONE HYDROCHLORIDE 2 MG: 2 TABLET ORAL at 09:59

## 2018-01-15 RX ADMIN — ACETAMINOPHEN 975 MG: 325 TABLET ORAL at 13:47

## 2018-01-15 RX ADMIN — CLINDAMYCIN PHOSPHATE 900 MG: 18 INJECTION, SOLUTION INTRAVENOUS at 20:38

## 2018-01-15 RX ADMIN — KETAMINE HCL-NACL SOLN PREF SY 50 MG/5ML-0.9% (10MG/ML) 10 MG: 10 SOLUTION PREFILLED SYRINGE at 11:57

## 2018-01-15 RX ADMIN — OXYCODONE HYDROCHLORIDE 5 MG: 5 TABLET ORAL at 20:38

## 2018-01-15 RX ADMIN — PIPERACILLIN SODIUM AND TAZOBACTAM SODIUM 3.38 G: 36; 4.5 INJECTION, POWDER, LYOPHILIZED, FOR SOLUTION INTRAVENOUS at 07:06

## 2018-01-15 RX ADMIN — SODIUM CHLORIDE, POTASSIUM CHLORIDE, SODIUM LACTATE AND CALCIUM CHLORIDE: 600; 310; 30; 20 INJECTION, SOLUTION INTRAVENOUS at 07:39

## 2018-01-15 RX ADMIN — KETAMINE HCL-NACL SOLN PREF SY 50 MG/5ML-0.9% (10MG/ML) 20 MG: 10 SOLUTION PREFILLED SYRINGE at 12:13

## 2018-01-15 RX ADMIN — FENTANYL CITRATE 50 MCG: 50 INJECTION, SOLUTION INTRAMUSCULAR; INTRAVENOUS at 12:20

## 2018-01-15 RX ADMIN — LEVOFLOXACIN 500 MG: 5 INJECTION, SOLUTION INTRAVENOUS at 16:07

## 2018-01-15 RX ADMIN — SODIUM CHLORIDE, POTASSIUM CHLORIDE, SODIUM LACTATE AND CALCIUM CHLORIDE: 600; 310; 30; 20 INJECTION, SOLUTION INTRAVENOUS at 15:00

## 2018-01-15 RX ADMIN — KETAMINE HCL-NACL SOLN PREF SY 50 MG/5ML-0.9% (10MG/ML) 10 MG: 10 SOLUTION PREFILLED SYRINGE at 12:02

## 2018-01-15 RX ADMIN — ACETAMINOPHEN 975 MG: 325 TABLET ORAL at 20:37

## 2018-01-15 RX ADMIN — ASPIRIN 81 MG CHEWABLE TABLET 81 MG: 81 TABLET CHEWABLE at 13:47

## 2018-01-15 RX ADMIN — FENTANYL CITRATE 50 MCG: 50 INJECTION, SOLUTION INTRAMUSCULAR; INTRAVENOUS at 11:54

## 2018-01-15 RX ADMIN — KETAMINE HCL-NACL SOLN PREF SY 50 MG/5ML-0.9% (10MG/ML) 10 MG: 10 SOLUTION PREFILLED SYRINGE at 11:55

## 2018-01-15 RX ADMIN — Medication 0.5 MG: at 07:47

## 2018-01-15 RX ADMIN — OXYCODONE HYDROCHLORIDE 5 MG: 5 TABLET ORAL at 15:04

## 2018-01-15 RX ADMIN — MIDAZOLAM 2 MG: 1 INJECTION INTRAMUSCULAR; INTRAVENOUS at 11:53

## 2018-01-15 RX ADMIN — GABAPENTIN 300 MG: 300 CAPSULE ORAL at 13:47

## 2018-01-15 RX ADMIN — KETAMINE HCL-NACL SOLN PREF SY 50 MG/5ML-0.9% (10MG/ML) 10 MG: 10 SOLUTION PREFILLED SYRINGE at 12:03

## 2018-01-15 RX ADMIN — LIDOCAINE HYDROCHLORIDE 60 MG: 20 INJECTION, SOLUTION INFILTRATION; PERINEURAL at 11:55

## 2018-01-15 RX ADMIN — HYDRALAZINE HYDROCHLORIDE 5 MG: 20 INJECTION INTRAMUSCULAR; INTRAVENOUS at 12:15

## 2018-01-15 RX ADMIN — KETAMINE HCL-NACL SOLN PREF SY 50 MG/5ML-0.9% (10MG/ML) 10 MG: 10 SOLUTION PREFILLED SYRINGE at 12:00

## 2018-01-15 RX ADMIN — ONDANSETRON 4 MG: 2 INJECTION INTRAMUSCULAR; INTRAVENOUS at 12:34

## 2018-01-15 RX ADMIN — GABAPENTIN 300 MG: 300 CAPSULE ORAL at 20:37

## 2018-01-15 RX ADMIN — CLINDAMYCIN PHOSPHATE 900 MG: 18 INJECTION, SOLUTION INTRAVENOUS at 05:38

## 2018-01-15 RX ADMIN — SENNOSIDES AND DOCUSATE SODIUM 2 TABLET: 8.6; 5 TABLET ORAL at 20:37

## 2018-01-15 RX ADMIN — Medication 0.5 MG: at 13:46

## 2018-01-15 NOTE — ANESTHESIA CARE TRANSFER NOTE
Patient: Estevan Callejas    Procedure(s):  Dressing Change and exam under Monitored Anesthesia Care with wound vac placement - Wound Class: II-Clean Contaminated    Diagnosis: Perineal Abscess  Diagnosis Additional Information: No value filed.    Anesthesia Type:   MAC     Note:  Airway :Room Air  Patient transferred to:Medical/Surgical Unit  Comments: Pt alert, conversing, breathing spontaneously on RA. VSS. Report called to 7B and shared with RN receiving patient. Transferred via orderly.Handoff Report: Identifed the Patient, Identified the Reponsible Provider, Reviewed the pertinent medical history, Discussed the surgical course, Reviewed Intra-OP anesthesia mangement and issues during anesthesia, Set expectations for post-procedure period and Allowed opportunity for questions and acknowledgement of understanding      Vitals: (Last set prior to Anesthesia Care Transfer)    CRNA VITALS  1/15/2018 1227 - 1/15/2018 1258      1/15/2018             Pulse: 70    SpO2: 99 %                Electronically Signed By: ANDRES Galvez CRNA  January 15, 2018  12:58 PM

## 2018-01-15 NOTE — PLAN OF CARE
Problem: Patient Care Overview  Goal: Plan of Care/Patient Progress Review  Outcome: Improving  Pt in OR for I/D from ~09:00-13:15. AVSS. RA. Pain well controlled, IV dilaudid x2, oxycodone x1. Tolerating diet, ate 100% of tray after OR. Denies nausea. +BS, +flatus, BM this morning. Peterson catheter patent, adequate UOP, kang. Wound vac placed in OR today, 125 continuous therapy, pulling scant SS drainage, dressing to perineum c/d/i. Ambulates w/SBA. LR @100mL/hr via PIV. Continue POC.

## 2018-01-15 NOTE — OP NOTE
DATE OF SURGERY:  01/13/2018      PREOPERATIVE DIAGNOSIS:  Necrotizing soft tissue infection of perineum      POSTOPERATIVE DIAGNOSIS:  Same    PROCEDURE:  Incision and debridement of perineal abscess by General Surgery    SURGEONS:   Tru Lopez MD.      RESIDENT:    Bowen Parker MD      FINDINGS: Urology called for intraoperative consult after debridement of perineum. No obvious residual necrotic soft tissue. No obvious involvement of the urethra or scrotal skin.      ESTIMATED BLOOD LOSS:  None for our portion    COMPLICATIONS:  None.        DRAINS:  None.      INDICATIONS FOR PROCEDURE:  Estevan Callejas is a 42-year-old gentleman with diabetes who presented with about 5 days of perineal pain.  A CT scan was obtained that showed significant perineal abscess with air-fluid levels.  General surgery took the pt to the OR for perineal debridement. Urology was called intraoperatively.     OPERATIVE DETAILS: When we scrubbed in the perineal wound had already been debrided. On our examination the urethra was visible and there was no obvious residual necrotic soft tissue, no obvious involvement of the urethra or scrotum and healthy bleeding edges. There was one small area of questionable appearing tissue in the right lateral portion of the wound that was subsequently debrided by the gen surg team. This concluded our involvement in the case.      Bowen Parker

## 2018-01-15 NOTE — BRIEF OP NOTE
[facility], [place]    Brief Operative Note    Pre-operative diagnosis: Perineal Abscess  Post-operative diagnosis Same  Procedure: Procedure(s):  Dressing Change and exam under Monitored Anesthesia Care with wound vac placement - Wound Class: II-Clean Contaminated  Surgeon: Surgeon(s) and Role:     * Stefania Munroe MD - Primary     * Shannon Ríos MD - Resident - Assisting     * Christine Quiroz MD - Resident - Assisting  Anesthesia: General   Estimated blood loss: Minimal  Drains: Wound vac  Specimens: * No specimens in log *  Findings:   Wound clean, ~8cm deep by 6cm wide by 14cm long. Very minimal cellulitis. No further debridement indicated. Vac placed.  Complications: None.  Implants: None.      Plan:  - Vac to -125mmHg. Change to wet to dry if leaking.  - ADAT, d/c IVFs when tolerating diet.  - Return to OR Thursday for vac change under MAC.  - Peterson to remain until scrotum less edematous, likely remove after procedure Thursday.  - Continue clinda/levo, no specified course yet (pending wound appearance on Thursday).  - PT/OT. Lovenox to start tomorrow.    - - - - - - - - - - - - - - - - - -  Christine Quiroz MD  General Surgery PGY-2    See [facility] for on-call pager information.

## 2018-01-15 NOTE — PLAN OF CARE
Problem: Patient Care Overview  Goal: Plan of Care/Patient Progress Review  Outcome: No Change  VSS on RA. Pt c/o soreness to perianal area. Oxycodone 5mg given with relief. Peterson with adequate UOP. BM x1. Pt up with SBA. Perianal incision with packing and ABD. ABD with scant serosanguineous drainage. Pad changed. Iv abx infused per order. PIV SL. Tolerating diet. Denied N/V. NPO at MN. Plan for OR tomorrow. Continue POC.

## 2018-01-15 NOTE — PLAN OF CARE
"Problem: Infection, Risk/Actual (Adult)  Intervention: Prevent Infection/Maximize Resistance  /72 (BP Location: Left arm)  Pulse 71  Temp 99  F (37.2  C) (Oral)  Resp 16  Ht 1.753 m (5' 9\")  Wt 114 kg (251 lb 5.2 oz)  SpO2 93%  BMI 37.11 kg/m2  Pt afebrile, vitals stable, pt denies pain. Perineal incision dressing C/D/I, surrounding skin reddened. Peterson patent with adequate clear urine. Potassium replaced on evening shift, recheck results pending. Pt tolerating antibiotics per orders, NPO since midnight for possible OR today for another I & D. Cont with plan of care.         "

## 2018-01-15 NOTE — ANESTHESIA PREPROCEDURE EVALUATION
ANESTHESIA PREOP EVALUATION    NPO Status: NPO per Anesthesia Guidelines for Procedure/Surgery Except for: Meds    Procedure: dressing change     HPI: perirectal abcess     PMHx/PSHx/ROS:  PAST MEDICAL HISTORY:   Past Medical History:   Diagnosis Date     Diabetes (H)        PAST SURGICAL HISTORY:   Past Surgical History:   Procedure Laterality Date     ORTHOPEDIC SURGERY       TONSILLECTOMY, ADENOIDECTOMY, COMBINED         FAMILY HISTORY: History reviewed. No pertinent family history.      Past Anes Hx: No personal or family h/o anesthesia problems    Soc Hx:   Tobacco:   EtOH:     Allergies: No Known Allergies    Meds:   Prescriptions Prior to Admission   Medication Sig Dispense Refill Last Dose     oxyCODONE-acetaminophen (PERCOCET) 5-325 MG per tablet Take 1-2 tablets by mouth every 4 hours as needed 20 tablet 0 Past Week at Unknown time     metFORMIN (GLUCOPHAGE) 500 MG tablet TAKE TWO TABLETS BY MOUTH TWICE A DAY WITH MEALS 120 tablet 0 1/12/2018 at Am     gabapentin (NEURONTIN) 300 MG capsule Take 1 capsule (300 mg) by mouth 3 times daily 270 capsule 1 1/12/2018 at AM     lisinopril (PRINIVIL/ZESTRIL) 2.5 MG tablet Take 1 tablet (2.5 mg) by mouth daily (Patient not taking: Reported on 1/10/2018) 90 tablet 1 More than a month at Unknown time     aspirin 81 MG tablet Take 1 tablet (81 mg) by mouth daily 90 tablet 3 More than a month at Unknown time     blood glucose monitoring (NO BRAND SPECIFIED) test strip Use to test blood sugars  One  times daily in am 90 each 3 Taking     blood glucose monitoring (NO BRAND SPECIFIED) meter device kit Use to test blood sugar one times daily or as directed. 1 kit 0 Taking     blood glucose (NO BRAND SPECIFIED) lancets standard Use to test blood sugar one times daily or as directed. 1 Box 0 Taking       No current outpatient prescriptions on file.       Physical Exam:  VS: T 98.6, P 71, /80, R 16, SpO2 96%     Airway: MP 2, TM>3FB, Neck full ROM  Dentition: no loose  teeth  Heart: RRR  Lungs: CTAB      BMP:  Lab Results   Component Value Date     01/15/2018      Lab Results   Component Value Date    POTASSIUM 3.5 01/15/2018     Lab Results   Component Value Date    CHLORIDE 106 01/15/2018     Lab Results   Component Value Date    THEO 7.8 01/15/2018     Lab Results   Component Value Date    CO2 25 01/15/2018     Lab Results   Component Value Date    BUN 7 01/15/2018     Lab Results   Component Value Date    CR 0.55 01/15/2018     Lab Results   Component Value Date     01/15/2018        CBC:  Lab Results   Component Value Date    WBC 10.9 01/15/2018     Lab Results   Component Value Date    HGB 10.9 01/15/2018     Lab Results   Component Value Date    HCT 33.9 01/15/2018     Lab Results   Component Value Date     01/15/2018        Coags/Type and Screen  Lab Results   Component Value Date    INR 1.26 01/13/2018     No results found for: PT  Type and Screen:      Assessment/Plan:  - ASA 3  - GETA with standard ASA monitors, IV induction, balanced anesthetic  - PIV  - Antibiotics per surgery  - PONV prophylaxis  - Blood products 0    Antwan Arellano MD    1/15/2018  9:19 AM                      Anesthesia Plan      History & Physical Review  History and physical reviewed and following examination; no interval change.    ASA Status:  3 .    NPO Status:  > 4 hours    Plan for MAC with Intravenous induction. Maintenance will be TIVA.  Reason for MAC:  Deep or markedly invasive procedure (G8) and Difficulty with conscious sedation (QS)  PONV prophylaxis:  Ondansetron (or other 5HT-3)  History and physical assessed; Patient examined.  I have reviewed and agree with this pre-op assessment and anesthetic plan.  Patient and family also agree.   Risks and alternatives presented and discussed.   AQA.  -rcp  PLAN  Patient received 2 mg oral DILAUDID in 3C holding room today.   Will use low dose propofol plus KETAMINE (7 - 10 mg) iv boluses as needed for pain.   --rcp       Postoperative Care  Postoperative pain management:  IV analgesics.      Consents  Anesthetic plan, risks, benefits and alternatives discussed with:  Patient.  Use of blood products discussed: No .   .                          .

## 2018-01-15 NOTE — ANESTHESIA POSTPROCEDURE EVALUATION
Patient: Estevan Callejas    Procedure(s):  Dressing Change and exam under Monitored Anesthesia Care with wound vac placement - Wound Class: II-Clean Contaminated    Diagnosis:Perineal Abscess  Diagnosis Additional Information: No value filed.    Anesthesia Type:  MAC    Note:  Anesthesia Post Evaluation    Patient location during evaluation: Floor  Patient participation: Able to fully participate in evaluation  Level of consciousness: awake and awake and alert  Pain management: adequate  Airway patency: patent  Cardiovascular status: acceptable  Respiratory status: acceptable  Hydration status: acceptable  PONV: controlled     Anesthetic complications: None    Comments: Patient awake to voice, clear a/w, reoriented x 3..  Stable VS.  No new or current issues evident at this time.  OK direct back to the floor room as per protocol.  --rcp          Last vitals:  Vitals:    01/14/18 1715 01/14/18 2014 01/15/18 0850   BP: 125/68 125/72 131/80   Pulse:      Resp: 16 16 16   Temp: 36.4  C (97.6  F) 37.2  C (99  F) 37  C (98.6  F)   SpO2: 97% 93% 96%         Electronically Signed By: Antwan Arellano MD  January 15, 2018  1:14 PM

## 2018-01-16 LAB
BACTERIA SPEC CULT: ABNORMAL
ERYTHROCYTE [DISTWIDTH] IN BLOOD BY AUTOMATED COUNT: 12.6 % (ref 10–15)
GLUCOSE BLDC GLUCOMTR-MCNC: 113 MG/DL (ref 70–99)
GLUCOSE BLDC GLUCOMTR-MCNC: 130 MG/DL (ref 70–99)
GLUCOSE BLDC GLUCOMTR-MCNC: 152 MG/DL (ref 70–99)
GLUCOSE BLDC GLUCOMTR-MCNC: 156 MG/DL (ref 70–99)
GLUCOSE BLDC GLUCOMTR-MCNC: 157 MG/DL (ref 70–99)
GLUCOSE BLDC GLUCOMTR-MCNC: 177 MG/DL (ref 70–99)
HCT VFR BLD AUTO: 32.7 % (ref 40–53)
HGB BLD-MCNC: 10.6 G/DL (ref 13.3–17.7)
MCH RBC QN AUTO: 31.2 PG (ref 26.5–33)
MCHC RBC AUTO-ENTMCNC: 32.4 G/DL (ref 31.5–36.5)
MCV RBC AUTO: 96 FL (ref 78–100)
PLATELET # BLD AUTO: 305 10E9/L (ref 150–450)
RBC # BLD AUTO: 3.4 10E12/L (ref 4.4–5.9)
SPECIMEN SOURCE: ABNORMAL
WBC # BLD AUTO: 10 10E9/L (ref 4–11)

## 2018-01-16 PROCEDURE — 25000132 ZZH RX MED GY IP 250 OP 250 PS 637: Performed by: STUDENT IN AN ORGANIZED HEALTH CARE EDUCATION/TRAINING PROGRAM

## 2018-01-16 PROCEDURE — 85027 COMPLETE CBC AUTOMATED: CPT | Performed by: SURGERY

## 2018-01-16 PROCEDURE — 40000141 ZZH STATISTIC PERIPHERAL IV START W/O US GUIDANCE

## 2018-01-16 PROCEDURE — 40000894 ZZH STATISTIC OT IP EVAL DEFER: Performed by: OCCUPATIONAL THERAPIST

## 2018-01-16 PROCEDURE — 25000125 ZZHC RX 250: Performed by: STUDENT IN AN ORGANIZED HEALTH CARE EDUCATION/TRAINING PROGRAM

## 2018-01-16 PROCEDURE — E2402 NEG PRESS WOUND THERAPY PUMP: HCPCS

## 2018-01-16 PROCEDURE — 36415 COLL VENOUS BLD VENIPUNCTURE: CPT | Performed by: SURGERY

## 2018-01-16 PROCEDURE — 00000146 ZZHCL STATISTIC GLUCOSE BY METER IP

## 2018-01-16 PROCEDURE — 25000131 ZZH RX MED GY IP 250 OP 636 PS 637: Performed by: STUDENT IN AN ORGANIZED HEALTH CARE EDUCATION/TRAINING PROGRAM

## 2018-01-16 PROCEDURE — 12000008 ZZH R&B INTERMEDIATE UMMC

## 2018-01-16 PROCEDURE — 25000128 H RX IP 250 OP 636: Performed by: SURGERY

## 2018-01-16 RX ORDER — DEXTROSE MONOHYDRATE 25 G/50ML
25-50 INJECTION, SOLUTION INTRAVENOUS
Status: DISCONTINUED | OUTPATIENT
Start: 2018-01-16 | End: 2018-01-18 | Stop reason: HOSPADM

## 2018-01-16 RX ORDER — NICOTINE POLACRILEX 4 MG
15-30 LOZENGE BUCCAL
Status: DISCONTINUED | OUTPATIENT
Start: 2018-01-16 | End: 2018-01-18 | Stop reason: HOSPADM

## 2018-01-16 RX ADMIN — METFORMIN HYDROCHLORIDE 1000 MG: 500 TABLET ORAL at 18:31

## 2018-01-16 RX ADMIN — CLINDAMYCIN PHOSPHATE 900 MG: 18 INJECTION, SOLUTION INTRAVENOUS at 13:49

## 2018-01-16 RX ADMIN — ACETAMINOPHEN 975 MG: 325 TABLET ORAL at 05:30

## 2018-01-16 RX ADMIN — CLINDAMYCIN PHOSPHATE 900 MG: 18 INJECTION, SOLUTION INTRAVENOUS at 05:29

## 2018-01-16 RX ADMIN — OXYCODONE HYDROCHLORIDE 10 MG: 5 TABLET ORAL at 18:02

## 2018-01-16 RX ADMIN — OXYCODONE HYDROCHLORIDE 5 MG: 5 TABLET ORAL at 13:49

## 2018-01-16 RX ADMIN — SENNOSIDES AND DOCUSATE SODIUM 2 TABLET: 8.6; 5 TABLET ORAL at 21:06

## 2018-01-16 RX ADMIN — CLINDAMYCIN PHOSPHATE 900 MG: 18 INJECTION, SOLUTION INTRAVENOUS at 21:06

## 2018-01-16 RX ADMIN — INSULIN ASPART 1 UNITS: 100 INJECTION, SOLUTION INTRAVENOUS; SUBCUTANEOUS at 13:38

## 2018-01-16 RX ADMIN — OXYCODONE HYDROCHLORIDE 5 MG: 5 TABLET ORAL at 09:42

## 2018-01-16 RX ADMIN — ENOXAPARIN SODIUM 40 MG: 40 INJECTION SUBCUTANEOUS at 17:53

## 2018-01-16 RX ADMIN — GABAPENTIN 300 MG: 300 CAPSULE ORAL at 13:49

## 2018-01-16 RX ADMIN — OXYCODONE HYDROCHLORIDE 5 MG: 5 TABLET ORAL at 21:06

## 2018-01-16 RX ADMIN — LEVOFLOXACIN 500 MG: 5 INJECTION, SOLUTION INTRAVENOUS at 15:49

## 2018-01-16 RX ADMIN — GABAPENTIN 300 MG: 300 CAPSULE ORAL at 09:41

## 2018-01-16 RX ADMIN — OXYCODONE HYDROCHLORIDE 10 MG: 5 TABLET ORAL at 03:24

## 2018-01-16 RX ADMIN — ASPIRIN 81 MG CHEWABLE TABLET 81 MG: 81 TABLET CHEWABLE at 09:41

## 2018-01-16 RX ADMIN — SENNOSIDES AND DOCUSATE SODIUM 2 TABLET: 8.6; 5 TABLET ORAL at 09:41

## 2018-01-16 RX ADMIN — GABAPENTIN 300 MG: 300 CAPSULE ORAL at 21:06

## 2018-01-16 ASSESSMENT — PAIN DESCRIPTION - DESCRIPTORS: DESCRIPTORS: SORE

## 2018-01-16 NOTE — PLAN OF CARE
Problem: Patient Care Overview  Goal: Plan of Care/Patient Progress Review  Outcome: No Change  Pt A&O x4.VSS on RA. Pain well managed with 5mg oxycodone x1. Tolerating diet.  and HS. Peterson catheter intact with adequate UOP. Wound Vac intact at -125mmhg suction. MIVF d/c. IV abx infused per order. Pt up with SBA. Continue POC.

## 2018-01-16 NOTE — PLAN OF CARE
Problem: Patient Care Overview  Goal: Plan of Care/Patient Progress Review  Outcome: Improving  AVSS. 96-98% RA. Pain well controlled, oxycodone 5mg x2. Tolerating diet w/o nausea, good appetite. , 130. Insulin sliding scale started. +BS, +flatus, soft BM x1. Peterson catheter patent, adequate UOP, kang. Wound vac, 125 continuous therapy, pulling small SS drainage, dressing to perineum c/d/i. Ambulates w/SBA. Showered this morning with minimal assist. Worked with OT. Intermittent abx via PIV. Continue POC.

## 2018-01-16 NOTE — PROGRESS NOTES
Care Coordinator Progress Note     Admission Date/Time:  1/13/2018  Attending MD:  Stefnaia Munroe*     Data  Chart reviewed, discussed with interdisciplinary team.   Patient was admitted for:    Abscess  Perirectal abscess.    Concerns with insurance coverage for discharge needs: None.  Current Living Situation: Patient lives with partner.  Support System: Supportive  Services Involved: No services in home prior to admit  Transportation: Family or Friend will provide  Barriers to Discharge: Medical Clearance    Coordination of Care and Referrals: Provided patient/family with options for Home Care.        Assessment  This RNCC met with patient at bedside, explained role of RNCC and reviewed plans for discharge. Patient states that he has a sister that lives close by and his girlfriend lives with him, he also has a sister that lives in the same [place] court as him who does not work. Patient was given choices for home care as he will need wound VAC changes 3 times a week at discharge, patient did not have preference and was okay with using [facility]. Patient states  one of his sisters can stay with him during the day when discharged as his other sister and girlfriend work during the day.     Referral was faxed to [facility]  Per [facility]  order Number [id number] is ready to be placed    Waiting to make referral to [facility] until determination has been made regarding  Home IV ABX    Plan  Anticipated Discharge Date:  TBD-back to OR on 1/18/18  Anticipated Discharge Plan:  Home with skilled nursing and wound VAC    Ghazal Elliott RN

## 2018-01-16 NOTE — PROGRESS NOTES
"Post-operative Check    Mr. Callejas is a 42 year old man who underwent perineal abscess resection with wound VAC placement. He is doing well post-operatively. Has no complaints at this time. Patient was sleeping when I first walked into his room, but woke up easily after I called him.     /72 (BP Location: Left arm)  Pulse 71  Temp 98.3  F (36.8  C) (Oral)  Resp 16  Ht 1.753 m (5' 9\")  Wt 114 kg (251 lb 5.2 oz)  SpO2 96%  BMI 37.11 kg/m2    Exam:   NAD, AOx3. Follows commands and oriented x3.  CV: RRR, no m/r/g  Resp: CTAB, no wheezes or rubs  Abdominal exam: No TTP in all four quadrants. No pain to percussion. BS+ on auscultation.     Wound VAC is set at 125mmHg with no issues found.    A/P:   - Pain well-managed with pain medications   - Please continue current cares and contact on-call surgery resident with any questions or concerns.     -----------------------------------  Yamliet Funk MD, MS  Neurosurgery PGY-1  [phone]      "

## 2018-01-16 NOTE — PROGRESS NOTES
Surgery Progress Note  1/16/2018     Subjective:  - JENNIFER overnight.  - Pain well-controlled. Denies N/V. Is passing flatus.  - Tolerating sips and crackers overnight.  - Patient states wound vac is not uncomfortable, has not noticed any drainage from the area.    Objective:  Temp:  [97  F (36.1  C)-98.6  F (37  C)] 97  F (36.1  C)  Heart Rate:  [53-93] 53  Resp:  [16-18] 18  BP: (118-142)/(59-83) 124/73  SpO2:  [96 %-98 %] 96 %  I/O last 3 completed shifts:  In: 1310 [P.O.:610; I.V.:700]  Out: 925 [Urine:925]    Gen: Awake, alert, NAD   Resp: NLB on room air  Abd: Soft, ND, NT   Ext: WWP  Dressing/Incision: Not examined, wound vac set to continuous negative pressure at 125mmHg    BMP  Recent Labs  Lab 01/15/18  0737 01/15/18  0338 01/14/18  0505 01/13/18  0126     --  137 131*   POTASSIUM 3.5 3.8 3.3* 3.7   CHLORIDE 106  --  104 97   THEO 7.8*  --  7.8* 8.0*   CO2 25  --  25 25   BUN 7  --  8 8   CR 0.55*  --  0.57* 0.71   *  --  128* 207*     CBC  Recent Labs  Lab 01/16/18  0713 01/15/18  0737 01/14/18  0505 01/13/18  0126   WBC 10.0 10.9 12.9* 17.8*   RBC 3.40* 3.50* 3.44* 4.12*   HGB 10.6* 10.9* 10.8* 12.9*   HCT 32.7* 33.9* 33.1* 38.4*   MCV 96 97 96 93   MCH 31.2 31.1 31.4 31.3   MCHC 32.4 32.2 32.6 33.6   RDW 12.6 12.7 12.7 12.0    245 163 163     INR  Recent Labs  Lab 01/13/18  0607   INR 1.26*      AST/ALT & Alk Phos  Recent Labs  Lab 01/13/18  0126   AST 59*   *   ALKPHOS 89     Bili  Recent Labs   Lab Test  01/13/18   0126  11/07/16   1651  02/18/15   1430   BILITOTAL  1.0  0.4  0.5     Lipase/AmlyaseNo lab results found in last 7 days.    A/P: Estevan Callejas is a 42 year old male with a PMHx of DM2, who presented with 5 days of fever, lola-anal pain, and lola-anal swelling with leukocytosis, and findings of perineal abscess with extension into scrotum on CT scan. S/P irrigation and debridement of perianal abscess 1/13/18, dressing changed under MAC 1/14/2018 and 1/15/2018.  -  Regular diet, D/C'd IVF  - Urology consultated, now following peripherally  - Vac to -125mmHg, change to wet-to-dry if leaking  - Return to OR Thursday 1/18 for vac change under MAC  - Continue with Peterson, until scrotum less edematous (most likely will be removed after Thursday)  - PT/OT  - Lovenox 40mg daily  - Pain well-controlled with current regimen of scheduled tylenol 975mg Q8H, dilaudid IV 0.3-0.5mg Q2H PRN, oxycodone po IR 5-10mg Q3H PRN  - Treating chronic medical condition DM2 with home medication: metformin 1000mg BID  - Treating low albumin (2.2) by optimizing nutrition with calorie counts, supplements with Ensure TID between meals.  - Patient will likely NOT need IV antibiotics at discharge.    D/w staff.    Rae Feldman DO  PGY1

## 2018-01-16 NOTE — PLAN OF CARE
"Problem: Patient Care Overview  Goal: Plan of Care/Patient Progress Review  Outcome: No Change  /75 (BP Location: Left arm)  Pulse 71  Temp 97.6  F (36.4  C) (Oral)  Resp 18  Ht 1.753 m (5' 9\")  Wt 114 kg (251 lb 5.2 oz)  SpO2 98%  BMI 37.11 kg/m2  Wound vac dressing is intact with no leaks at 125 of pressure. Oxycodone for pain. Antibiotics per schedule. Peterson with adequate urine output. Blood sugar less than 140. Continue with plan of care.        "

## 2018-01-17 LAB
GLUCOSE BLDC GLUCOMTR-MCNC: 115 MG/DL (ref 70–99)
GLUCOSE BLDC GLUCOMTR-MCNC: 135 MG/DL (ref 70–99)
GLUCOSE BLDC GLUCOMTR-MCNC: 142 MG/DL (ref 70–99)
GLUCOSE BLDC GLUCOMTR-MCNC: 148 MG/DL (ref 70–99)
GLUCOSE BLDC GLUCOMTR-MCNC: 202 MG/DL (ref 70–99)

## 2018-01-17 PROCEDURE — 12000008 ZZH R&B INTERMEDIATE UMMC

## 2018-01-17 PROCEDURE — 00000146 ZZHCL STATISTIC GLUCOSE BY METER IP

## 2018-01-17 PROCEDURE — E2402 NEG PRESS WOUND THERAPY PUMP: HCPCS

## 2018-01-17 PROCEDURE — 25000132 ZZH RX MED GY IP 250 OP 250 PS 637: Performed by: STUDENT IN AN ORGANIZED HEALTH CARE EDUCATION/TRAINING PROGRAM

## 2018-01-17 PROCEDURE — 25000128 H RX IP 250 OP 636: Performed by: SURGERY

## 2018-01-17 PROCEDURE — 25000125 ZZHC RX 250: Performed by: STUDENT IN AN ORGANIZED HEALTH CARE EDUCATION/TRAINING PROGRAM

## 2018-01-17 RX ADMIN — ASPIRIN 81 MG CHEWABLE TABLET 81 MG: 81 TABLET CHEWABLE at 08:32

## 2018-01-17 RX ADMIN — OXYCODONE HYDROCHLORIDE 10 MG: 5 TABLET ORAL at 14:29

## 2018-01-17 RX ADMIN — OXYCODONE HYDROCHLORIDE 10 MG: 5 TABLET ORAL at 05:46

## 2018-01-17 RX ADMIN — GABAPENTIN 300 MG: 300 CAPSULE ORAL at 13:25

## 2018-01-17 RX ADMIN — GABAPENTIN 300 MG: 300 CAPSULE ORAL at 20:19

## 2018-01-17 RX ADMIN — ACETAMINOPHEN 650 MG: 325 TABLET, FILM COATED ORAL at 14:29

## 2018-01-17 RX ADMIN — GABAPENTIN 300 MG: 300 CAPSULE ORAL at 08:32

## 2018-01-17 RX ADMIN — CLINDAMYCIN PHOSPHATE 900 MG: 18 INJECTION, SOLUTION INTRAVENOUS at 12:32

## 2018-01-17 RX ADMIN — METFORMIN HYDROCHLORIDE 1000 MG: 500 TABLET ORAL at 08:32

## 2018-01-17 RX ADMIN — CLINDAMYCIN PHOSPHATE 900 MG: 18 INJECTION, SOLUTION INTRAVENOUS at 04:36

## 2018-01-17 RX ADMIN — LEVOFLOXACIN 500 MG: 5 INJECTION, SOLUTION INTRAVENOUS at 16:00

## 2018-01-17 RX ADMIN — SENNOSIDES AND DOCUSATE SODIUM 2 TABLET: 8.6; 5 TABLET ORAL at 08:32

## 2018-01-17 RX ADMIN — CLINDAMYCIN PHOSPHATE 900 MG: 18 INJECTION, SOLUTION INTRAVENOUS at 21:38

## 2018-01-17 RX ADMIN — ENOXAPARIN SODIUM 40 MG: 40 INJECTION SUBCUTANEOUS at 16:00

## 2018-01-17 NOTE — PROGRESS NOTES
Surgery Progress Note  1/17/2018     Subjective:  - JENNIFER overnight.  - Pain well-controlled, able to sleep well overnight.  - Denies N/V. Is passing flatus, had bowel movement yesterday. Up an walking to bathroom.  - Tolerating sips and crackers overnight.  - Patient states wound vac feels in place and has not noticed any drainage.    Objective:  Temp:  [97.8  F (36.6  C)-98.7  F (37.1  C)] 97.8  F (36.6  C)  Pulse:  [68] 68  Heart Rate:  [63-77] 77  Resp:  [18] 18  BP: (120-139)/(69-87) 126/69  SpO2:  [96 %-98 %] 97 %  I/O last 3 completed shifts:  In: 1020 [P.O.:1020]  Out: 3250 [Urine:3250]    Gen: Awake, alert, NAD   Resp: NLB on room air  Abd: Soft, ND, NT   Ext: WWP  Dressing/Incision: Wound vac in place, no surrounding drainage, some swelling in scrotum - but decreased since admission. Wound vac set to continuous negative pressure at 125mmHg    BMP    Recent Labs  Lab 01/15/18  0737 01/15/18  0338 01/14/18  0505 01/13/18  0126     --  137 131*   POTASSIUM 3.5 3.8 3.3* 3.7   CHLORIDE 106  --  104 97   THEO 7.8*  --  7.8* 8.0*   CO2 25  --  25 25   BUN 7  --  8 8   CR 0.55*  --  0.57* 0.71   *  --  128* 207*     CBC    Recent Labs  Lab 01/16/18  0713 01/15/18  0737 01/14/18  0505 01/13/18  0126   WBC 10.0 10.9 12.9* 17.8*   RBC 3.40* 3.50* 3.44* 4.12*   HGB 10.6* 10.9* 10.8* 12.9*   HCT 32.7* 33.9* 33.1* 38.4*   MCV 96 97 96 93   MCH 31.2 31.1 31.4 31.3   MCHC 32.4 32.2 32.6 33.6   RDW 12.6 12.7 12.7 12.0    245 163 163     INR    Recent Labs  Lab 01/13/18  0607   INR 1.26*      AST/ALT & Alk Phos    Recent Labs  Lab 01/13/18  0126   AST 59*   *   ALKPHOS 89     Bili  Recent Labs   Lab Test  01/13/18   0126  11/07/16   1651  02/18/15   1430   BILITOTAL  1.0  0.4  0.5     Lipase/AmlyaseNo lab results found in last 7 days.    A/P: Estevan Callejas is a 42 year old male with a PMHx of DM2, who presented with 5 days of fever, lola-anal pain, and lola-anal swelling with leukocytosis, and  findings of perineal abscess with extension into scrotum on CT scan. S/P irrigation and debridement of perianal abscess 1/13/18, dressing changed under MAC 1/14/2018 and 1/15/2018.  - Regular diet  - Urology consultated, now following peripherally  - Vac to -125mmHg, change to wet-to-dry if leaking  - Return to OR Thursday 1/18 for vac change under MAC  - Continue with Peterson, until scrotum less edematous (most likely will be removed after Thursday OR)  - PT/OT - please assess for need of donut for discharge  - Lovenox 40mg daily  - Pain well-controlled with current regimen of scheduled tylenol 975mg Q8H, oxycodone po IR 5-10mg Q3H PRN (dilaudid IV 0.3-0.5mg Q2H PRN is available, but patient has not needed for 24h)  - Treating chronic medical condition DM2 with home medication: metformin 1000mg BID  - Treating low albumin (2.2) by optimizing nutrition with calorie counts, supplements with Ensure TID between meals.  - Patient will likely NOT need IV antibiotics at discharge.     D/w staff.    Rae Feldman DO  PGY1

## 2018-01-17 NOTE — PLAN OF CARE
Problem: Patient Care Overview  Goal: Plan of Care/Patient Progress Review  Outcome: No Change  VSS. Pain managed with Oxycodone 5-10mg x2 with adequate Relief. Wound Vac intact at -125mmhg continuous therapy. Dressing CDI.  and 152 at HS. Metformin restarted. Iv abx infused per order. Pt up with SBA. Peterson with adequate UOP. BM x1. Plan for OR Thursday. Continue POC.

## 2018-01-17 NOTE — PLAN OF CARE
Problem: Patient Care Overview  Goal: Plan of Care/Patient Progress Review  Outcome: No Change  Vital signs:  Temp: 97.9  F (36.6  C) Temp src: Oral BP: 120/73   Heart Rate: 77 Resp: 18 SpO2: 97 % O2 Device: None (Room air)     VSS on RA. PIV in right NS TKO. IV clindamycin given per orders. Wound vac to perineum to -125, intact. Peterson intact with total 1175 mL UOP. +BS, passing flatus. . Oxycodone given x1 for pain with some relief. Denies nausea. Sleeping most of night. Plan for OR 1/18.

## 2018-01-17 NOTE — PLAN OF CARE
Problem: Infection, Risk/Actual (Adult)  Goal: Identify Related Risk Factors and Signs and Symptoms  Related risk factors and signs and symptoms are identified upon initiation of Human Response Clinical Practice Guideline (CPG).   Outcome: Improving  Vitals:    01/16/18 1156 01/16/18 1616 01/16/18 2230 01/17/18 0700   BP: 128/84 139/87 120/73 126/69   BP Location: Left arm Left arm Left arm Left arm   Pulse:    68   Resp: 18 18 18 18   Temp: 97.9  F (36.6  C) 98.7  F (37.1  C) 97.9  F (36.6  C) 97.8  F (36.6  C)   TempSrc: Oral Oral Oral Oral   SpO2: 98% 96% 97% 95%   Weight:       Height:         Pt up with SBA. Denies pain for most part, declined oxycodone thus far today. Tolerating regular diet and had 2 BMs. S/s insulin d/c'd and pt taking oral metformin per home regimen;  and then 135. Adequate urine out gerber which is to stay in for now per MD. Plan is NPO for I+D procedure tomorrow. Wound vac to perirectal area is holding. Continue with the POC.

## 2018-01-17 NOTE — OP NOTE
[facility], [place]    Brief Operative Note    Pre-operative diagnosis: Perineal Abscess   Post-operative diagnosis same   Procedure: Procedure(s):  Dressing Change and exam under Monitored Anesthesia Care with wound vac placement - Wound Class: IV   Surgeon: Surgeon(s) and Role:     * Stefania Munroe MD - Primary     * Shannon Ríos MD - Resident - Assisting     * Christine Quiroz MD - Resident - Assisting   Anesthesia: General    Estimated blood loss: 5cc   Drains: None   Specimens: * No specimens in log *   Findings: None.     Complications: None.   Implants: None.           INDICATIONS FOR PROCEDURE  Estevan Callejas is a 42 year old male who has previously undergone debridement of his perineal area due to an abscess. He returns for a vac change.    General risks related to abdominal surgery were reviewed with the patient.    These include, but are not limited to, death, myocardial infarction, pneumonia, urinary tract infection, deep venous thrombosis with or without pulmonary embolus, abdominal infection from bowel injury or abscess, bowel obstruction, wound infection, and bleeding.      OPERATIVE PROCEDURE:  Under the benefits of ketamine sedation, his perineal area was prepped and draped. The packing was removed and the area irrigated. A LONNIE was performed which demonstrated >1cm of tissue left over his rectum. No evidence of rectal injury or involvement was noted. 2 nylon stitches were placed to begin to close the posterior aspect of the wound to allow for appropriate vac sealing.    A black sponge was placed in the perineal space. The vac dressing was applied.    The patient tolerated the procedure well.  Dr. Munroe was scrubbed for all critical components of the operation.    All sponge and needle counts were correct x 2 at the end of the procedure.    Shannon Ríos MD  PGY-7 Surgery Resident

## 2018-01-18 ENCOUNTER — ANESTHESIA EVENT (OUTPATIENT)
Dept: SURGERY | Facility: CLINIC | Age: 43
End: 2018-01-18
Payer: COMMERCIAL

## 2018-01-18 ENCOUNTER — ANESTHESIA (OUTPATIENT)
Dept: SURGERY | Facility: CLINIC | Age: 43
End: 2018-01-18
Payer: COMMERCIAL

## 2018-01-18 VITALS
TEMPERATURE: 98.2 F | DIASTOLIC BLOOD PRESSURE: 79 MMHG | OXYGEN SATURATION: 96 % | BODY MASS INDEX: 37.22 KG/M2 | RESPIRATION RATE: 16 BRPM | HEART RATE: 87 BPM | SYSTOLIC BLOOD PRESSURE: 137 MMHG | WEIGHT: 251.32 LBS | HEIGHT: 69 IN

## 2018-01-18 LAB
BACTERIA SPEC CULT: NORMAL
BACTERIA SPEC CULT: NORMAL
GLUCOSE BLDC GLUCOMTR-MCNC: 125 MG/DL (ref 70–99)
GLUCOSE BLDC GLUCOMTR-MCNC: 140 MG/DL (ref 70–99)
GLUCOSE BLDC GLUCOMTR-MCNC: 151 MG/DL (ref 70–99)
GLUCOSE BLDC GLUCOMTR-MCNC: 154 MG/DL (ref 70–99)
GLUCOSE BLDC GLUCOMTR-MCNC: 174 MG/DL (ref 70–99)
GLUCOSE BLDC GLUCOMTR-MCNC: 177 MG/DL (ref 70–99)
POTASSIUM SERPL-SCNC: 3.6 MMOL/L (ref 3.4–5.3)
SPECIMEN SOURCE: NORMAL
SPECIMEN SOURCE: NORMAL

## 2018-01-18 PROCEDURE — 25000128 H RX IP 250 OP 636: Performed by: NURSE ANESTHETIST, CERTIFIED REGISTERED

## 2018-01-18 PROCEDURE — 36000053 ZZH SURGERY LEVEL 2 EA 15 ADDTL MIN - UMMC: Performed by: SURGERY

## 2018-01-18 PROCEDURE — 00000146 ZZHCL STATISTIC GLUCOSE BY METER IP

## 2018-01-18 PROCEDURE — 36415 COLL VENOUS BLD VENIPUNCTURE: CPT | Performed by: SURGERY

## 2018-01-18 PROCEDURE — 27210794 ZZH OR GENERAL SUPPLY STERILE: Performed by: SURGERY

## 2018-01-18 PROCEDURE — E2402 NEG PRESS WOUND THERAPY PUMP: HCPCS

## 2018-01-18 PROCEDURE — 25000132 ZZH RX MED GY IP 250 OP 250 PS 637: Performed by: STUDENT IN AN ORGANIZED HEALTH CARE EDUCATION/TRAINING PROGRAM

## 2018-01-18 PROCEDURE — 84132 ASSAY OF SERUM POTASSIUM: CPT | Performed by: SURGERY

## 2018-01-18 PROCEDURE — 37000009 ZZH ANESTHESIA TECHNICAL FEE, EACH ADDTL 15 MIN: Performed by: SURGERY

## 2018-01-18 PROCEDURE — 36000051 ZZH SURGERY LEVEL 2 1ST 30 MIN - UMMC: Performed by: SURGERY

## 2018-01-18 PROCEDURE — 2W05X6Z CHANGE PRESSURE DRESSING ON BACK: ICD-10-PCS | Performed by: SURGERY

## 2018-01-18 PROCEDURE — 37000008 ZZH ANESTHESIA TECHNICAL FEE, 1ST 30 MIN: Performed by: SURGERY

## 2018-01-18 PROCEDURE — 40000170 ZZH STATISTIC PRE-PROCEDURE ASSESSMENT II: Performed by: SURGERY

## 2018-01-18 PROCEDURE — 25000125 ZZHC RX 250: Performed by: STUDENT IN AN ORGANIZED HEALTH CARE EDUCATION/TRAINING PROGRAM

## 2018-01-18 PROCEDURE — 25000128 H RX IP 250 OP 636: Performed by: SURGERY

## 2018-01-18 RX ORDER — ACETAMINOPHEN 325 MG/1
650 TABLET ORAL EVERY 4 HOURS PRN
Qty: 100 TABLET | COMMUNITY
Start: 2018-01-18

## 2018-01-18 RX ORDER — FENTANYL CITRATE 50 UG/ML
INJECTION, SOLUTION INTRAMUSCULAR; INTRAVENOUS PRN
Status: DISCONTINUED | OUTPATIENT
Start: 2018-01-18 | End: 2018-01-18

## 2018-01-18 RX ORDER — OXYCODONE HYDROCHLORIDE 5 MG/1
5-10 TABLET ORAL
Qty: 30 TABLET | Refills: 0 | Status: SHIPPED | OUTPATIENT
Start: 2018-01-18 | End: 2018-01-30

## 2018-01-18 RX ORDER — POLYETHYLENE GLYCOL 3350 17 G/17G
17 POWDER, FOR SOLUTION ORAL DAILY PRN
Qty: 30 PACKET | Refills: 1 | Status: SHIPPED | OUTPATIENT
Start: 2018-01-18 | End: 2019-03-22

## 2018-01-18 RX ORDER — SODIUM CHLORIDE, SODIUM LACTATE, POTASSIUM CHLORIDE, CALCIUM CHLORIDE 600; 310; 30; 20 MG/100ML; MG/100ML; MG/100ML; MG/100ML
INJECTION, SOLUTION INTRAVENOUS CONTINUOUS PRN
Status: DISCONTINUED | OUTPATIENT
Start: 2018-01-18 | End: 2018-01-18

## 2018-01-18 RX ADMIN — ENOXAPARIN SODIUM 40 MG: 40 INJECTION SUBCUTANEOUS at 15:59

## 2018-01-18 RX ADMIN — OXYCODONE HYDROCHLORIDE 10 MG: 5 TABLET ORAL at 15:59

## 2018-01-18 RX ADMIN — SODIUM CHLORIDE, POTASSIUM CHLORIDE, SODIUM LACTATE AND CALCIUM CHLORIDE: 600; 310; 30; 20 INJECTION, SOLUTION INTRAVENOUS at 07:29

## 2018-01-18 RX ADMIN — FENTANYL CITRATE 25 MCG: 50 INJECTION, SOLUTION INTRAMUSCULAR; INTRAVENOUS at 08:03

## 2018-01-18 RX ADMIN — GABAPENTIN 300 MG: 300 CAPSULE ORAL at 13:39

## 2018-01-18 RX ADMIN — OXYCODONE HYDROCHLORIDE 5 MG: 5 TABLET ORAL at 08:26

## 2018-01-18 RX ADMIN — CLINDAMYCIN PHOSPHATE 900 MG: 18 INJECTION, SOLUTION INTRAVENOUS at 13:39

## 2018-01-18 RX ADMIN — CLINDAMYCIN PHOSPHATE 900 MG: 18 INJECTION, SOLUTION INTRAVENOUS at 04:43

## 2018-01-18 RX ADMIN — FENTANYL CITRATE 25 MCG: 50 INJECTION, SOLUTION INTRAMUSCULAR; INTRAVENOUS at 08:00

## 2018-01-18 RX ADMIN — LEVOFLOXACIN 500 MG: 5 INJECTION, SOLUTION INTRAVENOUS at 16:00

## 2018-01-18 ASSESSMENT — LIFESTYLE VARIABLES: TOBACCO_USE: 1

## 2018-01-18 ASSESSMENT — ENCOUNTER SYMPTOMS: ORTHOPNEA: 0

## 2018-01-18 ASSESSMENT — COPD QUESTIONNAIRES: COPD: 0

## 2018-01-18 NOTE — PLAN OF CARE
Problem: Patient Care Overview  Goal: Plan of Care/Patient Progress Review  Outcome: No Change  Vital signs:  Temp: 97.1  F (36.2  C) Temp src: Oral BP: 146/72 Pulse: 57   Resp: 18 SpO2: 95 % O2 Device: None (Room air)     VSS on RA. PIV in right NS TKO. IV clindamycin given per orders. Wound vac to perineum to -125, intact. Peterson intact with total 1175 mL UOP. +BS, passing flatus. . Denies pain/nausea. Sleeping most of night. NPO at MN. [facility] wipes this AM. Left for OR at 0545 for I&D, vac change.

## 2018-01-18 NOTE — ANESTHESIA CARE TRANSFER NOTE
Patient: Estevan Callejas    Procedure(s):  Serial wound vac change perineal - Wound Class: III-Contaminated    Diagnosis: Perineal Wound   Diagnosis Additional Information: No value filed.    Anesthesia Type:   General, ETT     Note:  Airway :Room Air  Patient transferred to:Medical/Surgical Unit  Comments: Report called to charge RN.  Peterson was removed at 815 and patient is to urinate in 6 hours.Handoff Report: Identifed the Patient, Identified the Reponsible Provider, Reviewed the pertinent medical history, Discussed the surgical course, Reviewed Intra-OP anesthesia mangement and issues during anesthesia, Set expectations for post-procedure period and Allowed opportunity for questions and acknowledgement of understanding      Vitals: (Last set prior to Anesthesia Care Transfer)    CRNA VITALS  1/18/2018 0758 - 1/18/2018 0828      1/18/2018             Resp Rate (observed): (!)  1                Electronically Signed By: Karon Fontenot CRNA, APRN CRNA  January 18, 2018  8:28 AM

## 2018-01-18 NOTE — PLAN OF CARE
Problem: Patient Care Overview  Goal: Plan of Care/Patient Progress Review  Pt came back from OR at 8:45AM, wound vac intact to perineum to -125mm Hg. VSS on RA. PIV in right NS TKO. IV clindamycin given per orders. Peterson removed this morning, pt voiding. +BS, had large BM today. Denies pain/nausea. Pt is planning to discharge this evening when sister gets off work (probably 9PM).

## 2018-01-18 NOTE — PROGRESS NOTES
Care Coordinator- Discharge Planning     Admission Date/Time:  1/13/2018  Attending MD:  Stefania Munroe*     Data  Date of initial CC assessment:  Previous note  Chart reviewed, discussed with interdisciplinary team.   Patient was admitted for:   1. Drug-induced constipation    2. Abscess    3. Perirectal abscess         Assessment  Patient is ready for discharge today and sister will pick patient up later this evening.  Home wound VAC is at the bedside and will be connected by bedside nurse before discharge as well as teach on wet to dry dressing if wound VAC would come off prior to clinic appointment on Monday 1/22/18 (supplies to be sent home with patient) . Patient will go to OP wound and ostomy clinic for VAC changes while VAC is in place and then use [facility] teach of   BID dressing changes (caregiver to be trained with dressing changes, sister and  girlfriend)     This RNCC left VM message for Patrica Johnson [phone] at wound clinic to cancel 1/19/18 appt  @ 12:30pm for VAC change, per treatmnt team, okay to wait until Monday for next VAC change.      Plan  Anticipated Discharge Date:  1/18/18  Anticipated Discharge Plan:  Home with OP VAC changes    CTS Handoff completed:  YES  Ghazal Elliott RN

## 2018-01-18 NOTE — ANESTHESIA PREPROCEDURE EVALUATION
Anesthesia Evaluation     . Pt has had prior anesthetic. Type: General (Grade I view; easy)    No history of anesthetic complications          ROS/MED HX    ENT/Pulmonary:     (+)TRINITY risk factors snores loudly, hypertension, obese, daytime somnolence, tobacco use, Current use 0.5 packs/day  , . .   (-) asthma and COPD   Neurologic: Comment: S1 neuropathy    (+)neuropathy - S1,     Cardiovascular:  - neg cardiovascular ROS   (+) hypertension----. : . . . :. .      (-) JEROME, orthopnea/PND, irregular heartbeat/palpitations and valvular problems/murmurs   METS/Exercise Tolerance:  >4 METS   Hematologic: Comments: hgb 12.9 - neg hematologic  ROS       Musculoskeletal: Comment: Recent fall with chest wall pain, neg CXR        GI/Hepatic:  - neg GI/hepatic ROS      (-) GERD   Renal/Genitourinary:  - ROS Renal section negative       Endo: Comment: Body mass index is 36.92 kg/(m^2).      (+) type II DM Not using insulin Obesity, .      Psychiatric:     (+) psychiatric history other (comment) (heavy alcohol use)      Infectious Disease: Comment: Received clindamycin, ertapenem, zosyn in [place] [facility]  (+) Recent Fever, Other Infectious Disease perirectal abscess - fourier's; s/p I&D 1/13      Malignancy:      - no malignancy   Other:    - neg other ROS                 Physical Exam  Normal systems: pulmonary and dental    Airway   Mallampati: II  TM distance: >3 FB  Neck ROM: full    Dental     Cardiovascular   Rhythm and rate: regular and normal      Pulmonary                     Anesthesia Plan      History & Physical Review  History and physical reviewed and following examination; no interval change.    ASA Status:  2 .    NPO Status:  > 8 hours    Plan for MAC with Intravenous and Propofol induction. Maintenance will be Balanced.    PONV prophylaxis:  Ondansetron (or other 5HT-3) and Dexamethasone or Solumedrol  Additional equipment: 2nd IV      Postoperative Care  Postoperative pain management:  IV analgesics and  Multi-modal analgesia.  Plan for postoperative opioid use.    Consents  Anesthetic plan, risks, benefits and alternatives discussed with:  Patient.  Use of blood products discussed: Yes.   Use of blood products discussed with Patient.  Consented to blood products.  .                          .

## 2018-01-18 NOTE — OP NOTE
[facility], [place]    Brief Operative Note    Pre-operative diagnosis: Perineal Wound    Post-operative diagnosis Same   Procedure: Procedure(s):  Serial wound vac change perineal - Wound Class: II-Clean Contaminated   Surgeon: Surgeon(s) and Role:     * Stefania Munroe MD - Primary     * Shannon Ríos MD - Resident - Assisting   Anesthesia: General    Estimated blood loss: None   Drains: None   Specimens: * No specimens in log *   Findings: Good granulation tissue throughout. VAC replaced. Patient tolerated well with narcotic pain med and without sedation. .   Complications: None.   Implants: None.         Operative Report:  The patient was brought to the OR and placed in lithotomy position. No sedation was used. A time-out was performed verifying correct patient and procedure. The VAC apparatus was removed. The perineum was prepped in usual fashion. Good granulation tissue was noted throughout. No bleeding was encountered. A new VAC apparatus was applied. The patient tolerated the procedure well and was brought to PACU in stable condition. All counts were correct at the end of the procedure.    Dr. Munroe was present throughout the operation.      Aldo Figueroa MD  PGY-2 General Surgery

## 2018-01-18 NOTE — ANESTHESIA POSTPROCEDURE EVALUATION
Patient: Estevan Callejas    Procedure(s):  Serial wound vac change perineal - Wound Class: III-Contaminated    Diagnosis:Perineal Wound   Diagnosis Additional Information: No value filed.    Anesthesia Type:  MAC    Note:  Anesthesia Post Evaluation    Patient location during evaluation: Floor  Patient participation: Able to fully participate in evaluation  Level of consciousness: awake and alert  Pain management: adequate  Airway patency: patent  Cardiovascular status: acceptable  Respiratory status: acceptable  Hydration status: acceptable  PONV: none     Anesthetic complications: None          Last vitals:  Vitals:    01/17/18 2036 01/17/18 2329 01/18/18 0625   BP: 138/82 146/72 124/75   Pulse: 55 57    Resp: 18 18 18   Temp: 37.1  C (98.7  F) 36.2  C (97.1  F) 36.9  C (98.4  F)   SpO2: 97% 95% 94%         Electronically Signed By: Silver Sarkar MD  January 18, 2018  8:38 AM

## 2018-01-18 NOTE — PLAN OF CARE
Problem: Patient Care Overview  Goal: Individualization & Mutuality  Outcome: No Change  Vitals:    01/16/18 2230 01/17/18 0700 01/17/18 1521 01/17/18 2036   BP: 120/73 126/69 144/87 138/82   BP Location: Left arm Left arm Left arm Left arm   Pulse:  68 75 55   Resp: 18 18 18 18   Temp: 97.9  F (36.6  C) 97.8  F (36.6  C) 98.8  F (37.1  C) 98.7  F (37.1  C)   TempSrc: Oral Oral Oral Oral   SpO2: 97% 95% 97% 97%   Weight:       Height:         AVSS, denies pain this evening & no N/V. BG check with metformin, last . Good oral intake, gerber with pinkish urine output, +flatus & loose BM. Incision with wound vac intact at -125 negative suction/ small drainage. SBA to br. Abx per order. I&D tomorrow. Continue POC.

## 2018-01-19 ENCOUNTER — TELEPHONE (OUTPATIENT)
Dept: FAMILY MEDICINE | Facility: OTHER | Age: 43
End: 2018-01-19

## 2018-01-19 ENCOUNTER — CARE COORDINATION (OUTPATIENT)
Dept: SURGERY | Facility: CLINIC | Age: 43
End: 2018-01-19

## 2018-01-19 ENCOUNTER — CARE COORDINATION (OUTPATIENT)
Dept: CARE COORDINATION | Facility: CLINIC | Age: 43
End: 2018-01-19

## 2018-01-19 LAB
BACTERIA SPEC CULT: ABNORMAL
BACTERIA SPEC CULT: ABNORMAL
Lab: ABNORMAL
SPECIMEN SOURCE: ABNORMAL

## 2018-01-19 NOTE — PLAN OF CARE
Pt will discharge once sister arrives to drive him home. Pt verbalized understanding of wound vac care, wet-to-dry dressing changes (if wound vac comes apart/leaks), medications and follow-up appointments. Pt got RX from pharmacy.

## 2018-01-19 NOTE — TELEPHONE ENCOUNTER
"Hospital/TCU/ED for chronic condition Discharge Protocol    \"Hi, my name is Dannielle Harris, a registered nurse, and I am calling from [facility].  I am calling to follow up and see how things are going for you after your recent emergency visit/hospital/TCU stay.\"    Tell me how you are doing now that you are home? \"Good, feeling better.\"      Discharge Instructions    \"Let's review your discharge instructions.  What is/are the follow-up recommendations?  Pt. Response: going to the  for the wound vac for the next 3 weeks    \"Has an appointment with your primary care provider been scheduled?\"   Yes. (confirm)    \"When you see the provider, I would recommend that you bring your medications with you.\"    Medications    \"Tell me what changed about your medicines when you discharged?\"    Changes to chronic meds?    0-1    \"What questions do you have about your medications?\"    None     New diagnoses of heart failure, COPD, diabetes, or MI?    No       Medication reconciliation completed? Yes  Was MTM referral placed (*Make sure to put transitions as reason for referral)?   No    Call Summary    \"What questions or concerns do you have about your recent visit and your follow-up care?\"     none    \"If you have questions or things don't continue to improve, we encourage you contact us through the main clinic number (give number).  Even if the clinic is not open, triage nurses are available 24/7 to help you.     We would like you to know that our clinic has extended hours (provide information).  We also have urgent care (provide details on closest location and hours/contact info)\"      \"Thank you for your time and take care!\"       Dannielle Harris, RN, BSN     "

## 2018-01-19 NOTE — PROGRESS NOTES
RN Post-Op/Post-Discharge Care Coordination Note    Mr. Estevan Callejas is a 42 year old male who underwent Incision and debridement of perineal abscess on 1/13 with  Dr. Stefania Munroe.  Spoke with Patient.    Support  Family member assisting with care. Pt to see wound care 3 times/week for wvac changes.     Health Status  Fevers/chills: Patient denies any fever or chills.  Nausea/Vomiting: Patient denies nausea/vomiting.  Eating/drinking: Patient is able to eat and drink without any complaints.  Bowel habits: Patient reports having a normal bowel movement.  Urinary: Voiding normally  Drains (ELIDA): patient states his output is light red in color. he said it's the same as prior to being discharged from the hospital. There is minimal drainage.  Incisions: Patient denies any signs and symptoms of infection..  Wound closure:  wvac   Pain: patient is using Oxycodone prn. When he doesn't need a narcotic he is taking Tylenol. He states his pain is tolerable.  New Medications:  Oxycodone, Tylenol, Miralax    Activity/Restrictions  No lifting in excess of 15-20 pounds for 3-4 weeks    Equipment  Wound VAC    Pathology reviewed with patient:  Yes: previously reviewed    All of his questions were answered including reviewing restrictions, pathology, and wound care.  He will call this office if he has any further questions and/or concerns.      Patient is scheduled for a wound check in clinic with Dr. Antoine 1/31 at 8:30. He will see wound care the same day for wvac change.    Whom and When to Call  Patient acknowledges understanding of how to manage any medication changes and   when to seek medical care.     Patient advised that if after hour medical concerns arise to please call [phone] and choose option 4 to speak to the physician on call.     A copy of this note was routed to the primary surgeon.

## 2018-01-22 ENCOUNTER — OFFICE VISIT (OUTPATIENT)
Dept: WOUND CARE | Facility: CLINIC | Age: 43
End: 2018-01-22
Payer: COMMERCIAL

## 2018-01-22 DIAGNOSIS — T81.30XA DISRUPTION OF WOUND OF PERINEUM IN MALE: Primary | ICD-10-CM

## 2018-01-22 NOTE — MR AVS SNAPSHOT
After Visit Summary   1/22/2018    Estevan Callejas    MRN: [id number]           Patient Information     Date Of Birth          1975        Visit Information        Provider Department      1/22/2018 8:30 AM Patrica Johnson RN [facility] Wound Ostomy        Today's Diagnoses     Disruption of wound of perineum in male    -  1       Follow-ups after your visit        Your next 10 appointments already scheduled     Jan 24, 2018  8:30 AM CST   NEW WOUND NURSE VISIT with Christiano Calvillo RN   [facility] Wound Ostomy ([facility])    [address] [place] [id number]   [phone]            Jan 26, 2018  8:30 AM CST   NEW WOUND NURSE VISIT with Patrica Johnson RN   [facility] Wound Ostomy ([facility])    [address] [place] [id number] [phone]            Jan 29, 2018  8:30 AM CST   NEW WOUND NURSE VISIT with Patrica Johnson RN   [facility] Wound Ostomy ([facility])    [address] [place] [id number] [phone]            Jan 31, 2018  8:30 AM CST   NEW WOUND NURSE VISIT with Christiano Calvillo RN   [facility] Wound Ostomy ([facility])    [address] [place] [id number] [phone]            Jan 31, 2018  8:30 AM CST   (Arrive by 8:15 AM)   Post-Op with Inder Antoine MD   [facility] General Surgery ([facility])    [address] [place] [address]   [phone]            Feb 02, 2018 10:30 AM CST   RETURN WOUND NURSE VISIT with Patrica Johnson RN   [facility] Wound Ostomy ([facility])    [address] [place] [id number] [phone]            Feb 05, 2018  8:30 AM CST   RETURN WOUND NURSE VISIT with Patrica Johnson RN   [facility] Wound Ostomy ([facility])    [address] [place] [id number] [phone]            2018  8:30 AM CST   RETURN WOUND NURSE VISIT with Christiano Calvillo RN   [facility] Wound Ostomy ([facility])    [address] [place] [id number] [phone]            2018  8:30 AM CST   RETURN WOUND NURSE VISIT with Patrica Johnson RN   [facility] Wound Ostomy ([facility])    [address] [place] [address] [phone]              Who to contact     Please call your clinic at [phone] to:    Ask questions about your health    Make or cancel appointments    Discuss your medicines    Learn about your test results    Speak to your doctor   If you have compliments or concerns about an experience at your clinic, or if you wish to file a complaint, please contact [facility] Patient Relations at [phone] or email us at [email or web address]         Additional Information About Your Visit        [employer] Information     [employer] is an electronic gateway that provides easy, online access to your medical records. With comment.com, you can request a clinic appointment, read your test results, renew a prescription or communicate with your care team.     To sign up for [employer] visit the website at [email or web address]   You will be asked to enter the access code listed below, as well as some personal information. Please follow the directions to create your username and password.     Your access code is: [email or web address]  Expires: 2018 11:42 AM     Your access code will  in 90 days. If you need help or a new code, please contact your [facility] Clinic or call [phone] for assistance.        Care EveryWhere ID     This is your Care EveryWhere ID. This could be used by other organizations to access your [facility]  records  [id number]         Blood Pressure from Last 3 Encounters:   01/18/18 137/79   01/13/18 129/72   01/10/18 138/82    Weight from Last 3 Encounters:   01/14/18 114 kg (251 lb 5.2 oz)   01/13/18 113.4 kg (250 lb)   01/10/18 113 kg (249 lb 1.6 oz)              Today, you had the following     No orders found for display       Primary Care Provider Office Phone # Fax #    Lawanda Barrera [phone]       [address] [facility] [address]  [facility] [address]        Equal Access to Services     [facility]: Hadii jonas Acuna, yane mcguire, elfego fernández, elisabeth isabel . So [facility] [phone].    ATENCIÓN: Si habla español, tiene a parker disposición servicios gratuitos de asistencia lingüística. Llame al [phone].    We comply with applicable federal civil rights laws and [place] laws. We do not discriminate on the basis of race, color, national origin, age, disability, sex, sexual orientation, or gender identity.            Thank you!     Thank you for choosing [facility]  for your care. Our goal is always to provide you with excellent care. Hearing back from our patients is one way we can continue to improve our services. Please take a few minutes to complete the written survey that you may receive in the mail after your visit with us. Thank you!             Your Updated Medication List - Protect others around you: Learn how to safely use, store and throw away your medicines at www.disposemymeds.org.          This list is accurate as of: 1/22/18  9:02 AM.  Always use your most recent med list.                   Brand Name Dispense Instructions for use Diagnosis    acetaminophen 325 MG tablet    TYLENOL    100 tablet    Take 2 tablets (650 mg) by mouth every 4 hours as needed for other (multimodal surgical pain management along with NSAIDS and opioid medication as indicated based on pain control and physical function.)    Perirectal  abscess       aspirin 81 MG tablet     90 tablet    Take 1 tablet (81 mg) by mouth daily    Type 2 diabetes mellitus without complication, without long-term current use of insulin (H)       blood glucose lancets standard    no brand specified    1 Box    Use to test blood sugar one times daily or as directed.    Type 2 diabetes mellitus with complication, without long-term current use of insulin (H)       blood glucose monitoring meter device kit    no brand specified    1 kit    Use to test blood sugar one times daily or as directed.    Type 2 diabetes mellitus with complication, without long-term current use of insulin (H)       blood glucose monitoring test strip    no brand specified    90 each    Use to test blood sugars  One  times daily in am    Type 2 diabetes mellitus with complication, without long-term current use of insulin (H)       gabapentin 300 MG capsule    NEURONTIN    270 capsule    Take 1 capsule (300 mg) by mouth 3 times daily    Type 2 diabetes mellitus without complication, without long-term current use of insulin (H)       lisinopril 2.5 MG tablet    PRINIVIL/Zestril    90 tablet    Take 1 tablet (2.5 mg) by mouth daily    Type 2 diabetes mellitus without complication, without long-term current use of insulin (H)       metFORMIN 500 MG tablet    GLUCOPHAGE    120 tablet    TAKE TWO TABLETS BY MOUTH TWICE A DAY WITH MEALS    Type 2 diabetes mellitus with complication, without long-term current use of insulin (H)       oxyCODONE IR 5 MG tablet    ROXICODONE    30 tablet    Take 1-2 tablets (5-10 mg) by mouth every 3 hours as needed for breakthrough pain or moderate to severe pain    Perirectal abscess       polyethylene glycol Packet    MIRALAX/GLYCOLAX    30 packet    Take 17 g by mouth daily as needed for constipation    Drug-induced constipation

## 2018-01-22 NOTE — PROGRESS NOTES
Patient comes to wound clinic for wound assessment per request of dr. Munroe. 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 Previous treatment includes: wound vac MW  This is treatment week:1    Objective findings:      Location: midline posterior scrotum and perineum     Wound measured.: L 10 cm x, W 4 cm x, D 6  cm, Full thickness.    Wound description: no sign of infection    Tenderness:sometimes    Odor: none     Inflamed    Drainage is moderate,      Tunneling:  N/A      Undermining: N/A    Wound Base: moist and granulation some slough    Palpation of the wound bed:  normal    Slough appearance:  adherent   10  %    Eschar appearance:  none       Periwound Skin: intact,      Color: normal and consistent with surrounding tissue             Subjective finding:     Pt states: doing well, last vac change was in OR with MAC    Patient is assessed for discomfort which is: minimal    Today's status of the wound: initial assessment doing well    Treatment: Wound vac removed Wound Vac placement: Non-excisional debridement: Wound washed with [employer] soaked gauze and irrigated with Normal Saline. Patient is assessed for discomfort, which is moderate, and understanding of wound vac change procedure.  4% Lidocaine Solution  is used for removal of sponge. Hair is shaved . No-sting barrier film is not applied. Sterile gloves are donned.Wound is repacked with black foam and covered with adhesive film. 2 cm hole hole is cut in the adhesive film on top of which suction device is placed. Amount of sponges used are noted to be 1 . Vacuum pump is turned on  continuous suction at 125 mmHg and seal is confirmed. Instructions and warnings reiterated.  Dr Rowan cam in to assess the wound and possibility to remove the posterior sutures. He would like those to stay in place.       PLAN: Dressing  changes 3 times/week in clinic. No changes to treatment at this time,  Non-excisional debridement (no cutting was done), however removal of debris was performed with swabs, gauze and/or wet to dry dressings.       Pt received the following instructions:    If wound vac fails and wound edges can not be sealed remove the vac and pack wound with moist gauze and abd pad. Signs and symptoms of infection taught.  Patient needs to be seen 2-3 days.   Treated and follow-up appointment made.  Dr. Rowan was available for supervision of care if needed or if questions should arise and regarding plan of care. Patrica Johnson RN, CWON

## 2018-01-23 ENCOUNTER — CARE COORDINATION (OUTPATIENT)
Dept: SURGERY | Facility: CLINIC | Age: 43
End: 2018-01-23

## 2018-01-24 ENCOUNTER — OFFICE VISIT (OUTPATIENT)
Dept: WOUND CARE | Facility: CLINIC | Age: 43
End: 2018-01-24
Payer: COMMERCIAL

## 2018-01-24 DIAGNOSIS — T81.30XA DISRUPTION OF WOUND OF PERINEUM IN MALE: Primary | ICD-10-CM

## 2018-01-24 NOTE — PROGRESS NOTES
Patient comes to wound clinic for wound assessment per request of dr. Munroe. 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 Previous treatment includes: wound vac MW  This is treatment week:1    Objective findings:      Location: midline posterior scrotum and perineum     Wound measured.: 8.5 x 3 x 5.2 cm, Full thickness.    Wound description: no sign of infection    Tenderness:sometimes    Odor: none     Inflamed    Drainage is moderate,      Tunneling:  N/A      Undermining: N/A    Wound Base: moist and granulation     Palpation of the wound bed:  normal    Slough appearance:  none    Eschar appearance:  none       Periwound Skin: intact      Color: normal and consistent with surrounding tissue           Subjective finding:     Patient is assessed for discomfort which is: minimal    Today's status of the wound: improving    Treatment: Wound cleanses with technicare NS soln, rinsed with NS, Patient is assessed for discomfort, which is minimal to moderate, and understanding of wound vac change procedure. Wound vac removed Wound Vac placement: Sterile gloves are donned.Wound is repacked with black foam and covered with adhesive film. 2 cm hole hole is cut in the adhesive film on top of which suction device is placed. Amount of sponges used are noted to be 1 . Vacuum pump is turned on  continuous suction at 125 mmHg and seal is confirmed. Instructions and warnings reiterated.       PLAN: Dressing changes 3 times/week in clinic. No changes to treatment at this time       Pt received the following instructions:    If wound vac fails and wound edges can not be sealed remove the vac and pack wound with moist gauze and abd pad. Signs and symptoms of infection taught.  Patient needs to be seen 2-3 days.   Treated and follow-up appointment made.      Dr. Kong was available for  supervision of care if needed or if questions should arise regarding plan of care.     Christiano Calvillo RN, CWON

## 2018-01-24 NOTE — MR AVS SNAPSHOT
After Visit Summary   1/24/2018    Estevan Callejas    MRN: [id number]           Patient Information     Date Of Birth          1975        Visit Information        Provider Department      1/24/2018 8:30 AM Christiano Calvillo RN [facility] Wound Ostomy        Today's Diagnoses     Disruption of wound of perineum in male    -  1       Follow-ups after your visit        Your next 10 appointments already scheduled     Jan 26, 2018  8:30 AM CST   NEW WOUND NURSE VISIT with Patrica Johnson RN   [facility] Wound Ostomy ([facility])    [address] [place] [id number] [phone]            Jan 29, 2018  8:30 AM CST   NEW WOUND NURSE VISIT with Patrica Johnson RN   [facility] Wound Ostomy ([facility])    [address] [place] [address] [phone]            Jan 31, 2018  8:30 AM CST   NEW WOUND NURSE VISIT with Christiano Calvillo RN   [facility] Wound Ostomy ([facility])    [address] [place] [address] 84[address] [phone]            Jan 31, 2018  8:30 AM CST   (Arrive by 8:15 AM)   Post-Op with Inder Antoine MD   [facility] General Surgery ([facility])    [address] [place] [address] [phone]            Feb 02, 2018 10:30 AM CST   RETURN WOUND NURSE VISIT with Patrica Johnson RN   [facility] Wound Ostomy ([facility])    [address] [place] [address] [phone]            Feb 05, 2018  8:30 AM CST   RETURN WOUND NURSE VISIT with Patrica Johnson RN   [facility] Wound Ostomy ([facility])    [address] [place] [id number] [phone]            Feb 07, 2018  8:30 AM CST   RETURN WOUND NURSE VISIT with Christiano Calvillo RN   [facility] Wound Ostomy ([facility]  Surgery Center)    [address] [facility]  4th [facility] [id number] [phone]            2018  8:30 AM CST   RETURN WOUND NURSE VISIT with Patrica Johnson RN   [facility] Wound Ostomy ([facility] [place])    [address] [id number] [phone]              Who to contact     Please call your clinic at [phone] to:    Ask questions about your health    Make or cancel appointments    Discuss your medicines    Learn about your test results    Speak to your doctor   If you have compliments or concerns about an experience at your clinic, or if you wish to file a complaint, please contact [facility] Patient Relations at [phone] or email us at [email or web address]         Additional Information About Your Visit        galaxyadvisorsharFlinja Information     GetJar is an electronic gateway that provides easy, online access to your medical records. With GetJar, you can request a clinic appointment, read your test results, renew a prescription or communicate with your care team.     To sign up for GetJar visit the website at [email or web address]   You will be asked to enter the access code listed below, as well as some personal information. Please follow the directions to create your username and password.     Your access code is: [email or web address]  Expires: 2018 11:42 AM     Your access code will  in 90 days. If you need help or a new code, please contact your [facility] or call [phone] for assistance.        Care EveryWhere ID     This is your Care EveryWhere ID. This could be used by other organizations to access your [place] medical records  [id number]         Blood Pressure from Last 3 Encounters:   18 137/79   18 129/72   01/10/18 138/82    Weight from Last 3 Encounters:   18 114 kg (251 lb 5.2 oz)   18 113.4 kg (250 lb)   01/10/18 113  kg (249 lb 1.6 oz)              Today, you had the following     No orders found for display       Primary Care Provider Office Phone # Fax #    Lawanda Barrera [phone]       [address] [place] [address] [address]  [facility] [address]        Equal Access to Services     DAVID JORGE : Hadanna santana hadrayshawno Soomaali, waaxda luqadaha, qaybta kaalmada adeegyada, elisabeth stokesnorbertjefe isabel . So [facility] [phone].    ATENCIÓN: Si habla español, tiene a parker disposición servicios gratuitos de asistencia lingüística. Gamalame al [phone].    We comply with applicable federal civil rights laws and [place] laws. We do not discriminate on the basis of race, color, national origin, age, disability, sex, sexual orientation, or gender identity.            Thank you!     Thank you for choosing [facility] WOUND OSTOMY  for your care. Our goal is always to provide you with excellent care. Hearing back from our patients is one way we can continue to improve our services. Please take a few minutes to complete the written survey that you may receive in the mail after your visit with us. Thank you!             Your Updated Medication List - Protect others around you: Learn how to safely use, store and throw away your medicines at www.disposemymeds.org.          This list is accurate as of 1/24/18 10:57 AM.  Always use your most recent med list.                   Brand Name Dispense Instructions for use Diagnosis    acetaminophen 325 MG tablet    TYLENOL    100 tablet    Take 2 tablets (650 mg) by mouth every 4 hours as needed for other (multimodal surgical pain management along with NSAIDS and opioid medication as indicated based on pain control and physical function.)    Perirectal abscess       aspirin 81 MG tablet     90 tablet    Take 1 tablet (81 mg) by mouth daily    Type 2 diabetes mellitus without complication, without long-term current use of insulin (H)       blood glucose lancets standard    no brand  specified    1 Box    Use to test blood sugar one times daily or as directed.    Type 2 diabetes mellitus with complication, without long-term current use of insulin (H)       blood glucose monitoring meter device kit    no brand specified    1 kit    Use to test blood sugar one times daily or as directed.    Type 2 diabetes mellitus with complication, without long-term current use of insulin (H)       blood glucose monitoring test strip    no brand specified    90 each    Use to test blood sugars  One  times daily in am    Type 2 diabetes mellitus with complication, without long-term current use of insulin (H)       gabapentin 300 MG capsule    NEURONTIN    270 capsule    Take 1 capsule (300 mg) by mouth 3 times daily    Type 2 diabetes mellitus without complication, without long-term current use of insulin (H)       lisinopril 2.5 MG tablet    PRINIVIL/Zestril    90 tablet    Take 1 tablet (2.5 mg) by mouth daily    Type 2 diabetes mellitus without complication, without long-term current use of insulin (H)       metFORMIN 500 MG tablet    GLUCOPHAGE    120 tablet    TAKE TWO TABLETS BY MOUTH TWICE A DAY WITH MEALS    Type 2 diabetes mellitus with complication, without long-term current use of insulin (H)       oxyCODONE IR 5 MG tablet    ROXICODONE    30 tablet    Take 1-2 tablets (5-10 mg) by mouth every 3 hours as needed for breakthrough pain or moderate to severe pain    Perirectal abscess       polyethylene glycol Packet    MIRALAX/GLYCOLAX    30 packet    Take 17 g by mouth daily as needed for constipation    Drug-induced constipation

## 2018-01-26 ENCOUNTER — OFFICE VISIT (OUTPATIENT)
Dept: WOUND CARE | Facility: CLINIC | Age: 43
End: 2018-01-26
Payer: COMMERCIAL

## 2018-01-26 DIAGNOSIS — T81.30XA DISRUPTION OF WOUND OF PERINEUM IN MALE: Primary | ICD-10-CM

## 2018-01-26 NOTE — MR AVS SNAPSHOT
After Visit Summary   1/26/2018    Estevan Callejas    MRN: [id number]           Patient Information     Date Of Birth          1975        Visit Information        Provider Department      1/26/2018 8:30 AM Patrica Johnson RN [facility] Wound Ostomy        Today's Diagnoses     Disruption of wound of perineum in male    -  1       Follow-ups after your visit        Your next 10 appointments already scheduled     Jan 29, 2018  8:30 AM CST   NEW WOUND NURSE VISIT with Patrica Johnson RN   [facility] Wound Ostomy ([facility])    [address] [place] [address] [phone]            Jan 31, 2018  8:30 AM CST   NEW WOUND NURSE VISIT with Christiano Calvillo RN   [facility] Wound Ostomy ([facility])    [address] [place] [id number] [phone]            Jan 31, 2018  8:30 AM CST   (Arrive by 8:15 AM)   Post-Op with Inder Antoine MD   [facility] General Surgery ([facility])    [address] [place] [address]   [phone]            Feb 02, 2018 10:30 AM CST   RETURN WOUND NURSE VISIT with Patrica Johnson RN   [facility] Wound Ostomy ([facility])    [address] [place] [phone]            Feb 05, 2018  8:30 AM CST   RETURN WOUND NURSE VISIT with Patrica Johnson RN   [facility] Wound Ostomy ([facility])    [address] [place] [id number] [phone]            Feb 07, 2018  8:30 AM CST   RETURN WOUND NURSE VISIT with Christiano Calvillo RN   [facility] Wound Ostomy ([facility])    [address] [place] [id number] [phone]            Feb 09, 2018  8:30 AM CST   RETURN WOUND NURSE VISIT with Patrica Johnson RN   [facility] Wound Ostomy ([facility]  Surgery Center)    [address] [facility]  4th [facility] [address] [phone]              Who to contact     Please call your clinic at [phone] to:    Ask questions about your health    Make or cancel appointments    Discuss your medicines    Learn about your test results    Speak to your doctor   If you have compliments or concerns about an experience at your clinic, or if you wish to file a complaint, please contact [facility] Patient Relations at [phone] or email us at [email or web address]         Additional Information About Your Visit        Sinapis PharmaharAdpeps Information     Glassdoort is an electronic gateway that provides easy, online access to your medical records. With [employer], you can request a clinic appointment, read your test results, renew a prescription or communicate with your care team.     To sign up for [employer] visit the website at [email or web address]   You will be asked to enter the access code listed below, as well as some personal information. Please follow the directions to create your username and password.     Your access code is: [email or web address]  Expires: 2018 11:42 AM     Your access code will  in 90 days. If you need help or a new code, please contact your [facility] or call [phone] for assistance.        Care EveryWhere ID     This is your Care EveryWhere ID. This could be used by other organizations to access your [place] medical records  [id number]         Blood Pressure from Last 3 Encounters:   18 137/79   18 129/72   01/10/18 138/82    Weight from Last 3 Encounters:   18 114 kg (251 lb 5.2 oz)   18 113.4 kg (250 lb)   01/10/18 113 kg (249 lb 1.6 oz)              Today, you had the following     No orders found for display       Primary Care Provider Office Phone # Fax #    Lawanda Barrera [phone]       [address]  [place]  [facility] [address]        Equal Access to Services     [facility] HospitalLUIS ANGEL : Hadii aad ku hadrayshawnlast [place], wabarbarada rosemarieadaha, qaybta kaalmaelisabeth martinez. So [facility] [phone].    ATENCIÓN: Si habla español, tiene a parker disposición servicios gratuitos de asistencia lingüística. Llame al [phone].    We comply with applicable federal civil rights laws and [place] laws. We do not discriminate on the basis of race, color, national origin, age, disability, sex, sexual orientation, or gender identity.            Thank you!     Thank you for choosing [facility] WOUND OSTOMY  for your care. Our goal is always to provide you with excellent care. Hearing back from our patients is one way we can continue to improve our services. Please take a few minutes to complete the written survey that you may receive in the mail after your visit with us. Thank you!             Your Updated Medication List - Protect others around you: Learn how to safely use, store and throw away your medicines at www.disposemymeds.org.          This list is accurate as of 1/26/18  9:17 AM.  Always use your most recent med list.                   Brand Name Dispense Instructions for use Diagnosis    acetaminophen 325 MG tablet    TYLENOL    100 tablet    Take 2 tablets (650 mg) by mouth every 4 hours as needed for other (multimodal surgical pain management along with NSAIDS and opioid medication as indicated based on pain control and physical function.)    Perirectal abscess       aspirin 81 MG tablet     90 tablet    Take 1 tablet (81 mg) by mouth daily    Type 2 diabetes mellitus without complication, without long-term current use of insulin (H)       blood glucose lancets standard    no brand specified    1 Box    Use to test blood sugar one times daily or as directed.    Type 2 diabetes mellitus with complication, without long-term current use of insulin (H)       blood glucose monitoring meter device kit    no brand  specified    1 kit    Use to test blood sugar one times daily or as directed.    Type 2 diabetes mellitus with complication, without long-term current use of insulin (H)       blood glucose monitoring test strip    no brand specified    90 each    Use to test blood sugars  One  times daily in am    Type 2 diabetes mellitus with complication, without long-term current use of insulin (H)       gabapentin 300 MG capsule    NEURONTIN    270 capsule    Take 1 capsule (300 mg) by mouth 3 times daily    Type 2 diabetes mellitus without complication, without long-term current use of insulin (H)       lisinopril 2.5 MG tablet    PRINIVIL/Zestril    90 tablet    Take 1 tablet (2.5 mg) by mouth daily    Type 2 diabetes mellitus without complication, without long-term current use of insulin (H)       metFORMIN 500 MG tablet    GLUCOPHAGE    120 tablet    TAKE TWO TABLETS BY MOUTH TWICE A DAY WITH MEALS    Type 2 diabetes mellitus with complication, without long-term current use of insulin (H)       oxyCODONE IR 5 MG tablet    ROXICODONE    30 tablet    Take 1-2 tablets (5-10 mg) by mouth every 3 hours as needed for breakthrough pain or moderate to severe pain    Perirectal abscess       polyethylene glycol Packet    MIRALAX/GLYCOLAX    30 packet    Take 17 g by mouth daily as needed for constipation    Drug-induced constipation

## 2018-01-26 NOTE — PROGRESS NOTES
Patient comes to wound clinic for wound assessment per request of dr. Munroe. 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 Previous treatment includes: wound vac MW  This is treatment week:1    Objective findings:      Location: midline posterior scrotum and perineum     Wound measured.: 8.0 x 3 x 5 cm, Full thickness.    Deeper pocket in the lower posterior wall bringing total depth to 7 in that location    Wound description: no sign of infection    Tenderness:sometimes    Odor: none     Inflamed    Drainage is moderate,      Tunneling:  N/A      Undermining: N/A    Wound Base: moist and granulation     Palpation of the wound bed:  normal    Slough appearance:  none    Eschar appearance:  none       Periwound Skin: intact      Color: normal and consistent with surrounding tissue     Subjective finding:     Patient is assessed for discomfort which is: minimal    Today's status of the wound: improving    Treatment: Wound cleanses with technicare NS soln, rinsed with NS, Patient is assessed for discomfort, which is minimal to moderate, and understanding of wound vac change procedure. Wound vac removed Wound Vac placement: Sterile gloves are donned.Wound is repacked with black foam and covered with adhesive film. 2 cm hole hole is cut in the adhesive film on top of which suction device is placed. Amount of sponges used are noted to be 3 . Vacuum pump is turned on  continuous suction at 125 mmHg and seal is confirmed. Instructions and warnings reiterated.       PLAN: Dressing changes 3 times/week in clinic. No changes to treatment at this time       Pt received the following instructions:    If wound vac fails and wound edges can not be sealed remove the vac and pack wound with moist gauze and abd pad. Signs and symptoms of infection taught.  Patient needs to be seen 2-3 days.    Treated and follow-up appointment made.      Almas RAMESH was available for supervision of care if needed or if questions should arise regarding plan of care. Patrica Johnson RN CWON

## 2018-01-29 ENCOUNTER — OFFICE VISIT (OUTPATIENT)
Dept: WOUND CARE | Facility: CLINIC | Age: 43
End: 2018-01-29
Payer: COMMERCIAL

## 2018-01-29 DIAGNOSIS — T81.30XA DISRUPTION OF WOUND OF PERINEUM IN MALE: Primary | ICD-10-CM

## 2018-01-29 NOTE — MR AVS SNAPSHOT
After Visit Summary   1/29/2018    Estevan Callejas    MRN: [id number]           Patient Information     Date Of Birth          1975        Visit Information        Provider Department      1/29/2018 8:30 AM Patrica Johnson RN [facility] Wound Ostomy        Today's Diagnoses     Disruption of wound of perineum in male    -  1       Follow-ups after your visit        Your next 10 appointments already scheduled     Jan 29, 2018  8:30 AM CST   NEW WOUND NURSE VISIT with Patrica Johnson RN   [facility] Wound Ostomy ([facility])    [address] [place] [id number] [phone]            Jan 31, 2018  8:30 AM CST   NEW WOUND NURSE VISIT with Christiano Calvillo RN   [facility] Wound Ostomy ([facility])    [address] [place] [id number] [phone]            Jan 31, 2018  8:30 AM CST   (Arrive by 8:15 AM)   Post-Op with Inder Antoine MD   [facility] General Surgery ([facility])    [address] [place] [id number]   [phone]            Feb 02, 2018 10:30 AM CST   RETURN WOUND NURSE VISIT with Patrica Johnson RN   [facility] Wound Ostomy ([facility])    [address] [place] [id number] [phone]            Feb 05, 2018  8:30 AM CST   RETURN WOUND NURSE VISIT with Patrica Johnson RN   [facility] Wound Ostomy ([facility])    [address] [place] [id number] [phone]            Feb 07, 2018  8:30 AM CST   RETURN WOUND NURSE VISIT with Christiano Clavillo RN   [facility] Wound Ostomy ([facility])    [address] [place] [id number] [phone]            Feb 09, 2018  8:30 AM CST   RETURN WOUND NURSE VISIT with Patrica Johnson RN   [facility] Wound Ostomy ([facility])    [address]  [address] [facility] [facility] [address] [phone]              Who to contact     Please call your clinic at [phone] to:    Ask questions about your health    Make or cancel appointments    Discuss your medicines    Learn about your test results    Speak to your doctor   If you have compliments or concerns about an experience at your clinic, or if you wish to file a complaint, please contact [facility] Patient Relations at [phone] or email us at [email or web address]         Additional Information About Your Visit        [employer] Information     [email or web address] is an electronic gateway that provides easy, online access to your medical records. With [employer], you can request a clinic appointment, read your test results, renew a prescription or communicate with your care team.     To sign up for [employer] visit the website at [email or web address]   You will be asked to enter the access code listed below, as well as some personal information. Please follow the directions to create your username and password.     Your access code is: [email or web address]  Expires: 2018 11:42 AM     Your access code will  in 90 days. If you need help or a new code, please contact your [facility] or call [phone] for assistance.        Care EveryWhere ID     This is your Care EveryWhere ID. This could be used by other organizations to access your [place] medical records  [id number]         Blood Pressure from Last 3 Encounters:   18 137/79   18 129/72   01/10/18 138/82    Weight from Last 3 Encounters:   18 114 kg (251 lb 5.2 oz)   18 113.4 kg (250 lb)   01/10/18 113 kg (249 lb 1.6 oz)              Today, you had the following     No orders found for display       Primary Care Provider Office Phone # Fax #    Lawanda Barrera [phone]       [address]  [place]  [facility] [address]        Equal Access to Services     [facility] CenterLUIS ANGEL : Hadii aad ku hadrayshawnlast [place], wabarbarada rosemarieadaha, qaybta kaalmaelisabeth martinez. So [facility] [phone].    ATENCIÓN: Si habla español, tiene a parker disposición servicios gratuitos de asistencia lingüística. Llame al [phone].    We comply with applicable federal civil rights laws and [place] laws. We do not discriminate on the basis of race, color, national origin, age, disability, sex, sexual orientation, or gender identity.            Thank you!     Thank you for choosing [facility] WOUND OSTOMY  for your care. Our goal is always to provide you with excellent care. Hearing back from our patients is one way we can continue to improve our services. Please take a few minutes to complete the written survey that you may receive in the mail after your visit with us. Thank you!             Your Updated Medication List - Protect others around you: Learn how to safely use, store and throw away your medicines at www.disposemymeds.org.          This list is accurate as of 1/29/18  8:08 AM.  Always use your most recent med list.                   Brand Name Dispense Instructions for use Diagnosis    acetaminophen 325 MG tablet    TYLENOL    100 tablet    Take 2 tablets (650 mg) by mouth every 4 hours as needed for other (multimodal surgical pain management along with NSAIDS and opioid medication as indicated based on pain control and physical function.)    Perirectal abscess       aspirin 81 MG tablet     90 tablet    Take 1 tablet (81 mg) by mouth daily    Type 2 diabetes mellitus without complication, without long-term current use of insulin (H)       blood glucose lancets standard    no brand specified    1 Box    Use to test blood sugar one times daily or as directed.    Type 2 diabetes mellitus with complication, without long-term current use of insulin (H)       blood glucose monitoring meter device kit    no brand  specified    1 kit    Use to test blood sugar one times daily or as directed.    Type 2 diabetes mellitus with complication, without long-term current use of insulin (H)       blood glucose monitoring test strip    no brand specified    90 each    Use to test blood sugars  One  times daily in am    Type 2 diabetes mellitus with complication, without long-term current use of insulin (H)       gabapentin 300 MG capsule    NEURONTIN    270 capsule    Take 1 capsule (300 mg) by mouth 3 times daily    Type 2 diabetes mellitus without complication, without long-term current use of insulin (H)       lisinopril 2.5 MG tablet    PRINIVIL/Zestril    90 tablet    Take 1 tablet (2.5 mg) by mouth daily    Type 2 diabetes mellitus without complication, without long-term current use of insulin (H)       metFORMIN 500 MG tablet    GLUCOPHAGE    120 tablet    TAKE TWO TABLETS BY MOUTH TWICE A DAY WITH MEALS    Type 2 diabetes mellitus with complication, without long-term current use of insulin (H)       oxyCODONE IR 5 MG tablet    ROXICODONE    30 tablet    Take 1-2 tablets (5-10 mg) by mouth every 3 hours as needed for breakthrough pain or moderate to severe pain    Perirectal abscess       polyethylene glycol Packet    MIRALAX/GLYCOLAX    30 packet    Take 17 g by mouth daily as needed for constipation    Drug-induced constipation

## 2018-01-29 NOTE — PROGRESS NOTES
SUBJECTIVE:   Estevan Callejas is a 42 year old male who presents to clinic today for the following health issues:      History of Present Illness     Diabetes:     Frequency of checking blood sugars::  4 times a day    Diabetic concerns::  None    Hypoglycemia symptoms::  None    Paraesthesia present::  No    Eye Exam in the last year::  NO      Problem list and histories reviewed & adjusted, as indicated.  Additional history: as documented        Patient Active Problem List   Diagnosis     Peripheral neuropathy - near S2 dermatome     Type 2 diabetes, HbA1c goal < 7% (H)     Low back pain     Morbid obesity (H)     Perineal abscess     Wound abscess     Epidermal cyst of face     Past Surgical History:   Procedure Laterality Date     EXAM UNDER ANESTHESIA, CHANGE DRESSING (LOCATION), COMBINED N/A 1/15/2018    Procedure: COMBINED EXAM UNDER ANESTHESIA, CHANGE DRESSING (LOCATION);  Dressing Change and exam under Monitored Anesthesia Care with wound vac placement;  Surgeon: Stefania Munroe MD;  Location: [facility] OR     EXAM UNDER ANESTHESIA, CHANGE DRESSING (LOCATION), COMBINED N/A 1/18/2018    Procedure: COMBINED EXAM UNDER ANESTHESIA, CHANGE DRESSING (LOCATION);  Serial wound vac change perineal;  Surgeon: Stefania Munroe MD;  Location: [facility] OR     IRRIGATION AND DEBRIDEMENT PERINEAL, COMBINED N/A 1/13/2018    Procedure: COMBINED IRRIGATION AND DEBRIDEMENT PERINEAL;  Irrigation and Debridement of perianal abscess;  Surgeon: Stefania Munroe MD;  Location: [facility] OR     IRRIGATION AND DEBRIDEMENT PERINEAL, COMBINED N/A 1/14/2018    Procedure: COMBINED IRRIGATION AND DEBRIDEMENT PERINEAL;  combined irrigation and debridement perineal and dressing change;  Surgeon: Stefania Munroe MD;  Location: [facility] OR     ORTHOPEDIC SURGERY       TONSILLECTOMY, ADENOIDECTOMY, COMBINED         Social History   Substance Use Topics     Smoking status: Current Some Day Smoker     Types: Dip,  chew, snus or snuff, Cigarettes     Smokeless tobacco: Current User     Alcohol use Yes     History reviewed. No pertinent family history.      Current Outpatient Prescriptions   Medication Sig Dispense Refill     blood glucose monitoring (NO BRAND SPECIFIED) test strip Use to test blood sugars  3 times daily in am(reason- uncontrolled sugars with recent infections) 90 each 3     blood glucose (NO BRAND SPECIFIED) lancets standard Use to test blood sugar one times daily or as directed. 100 each 3     glipiZIDE (GLIPIZIDE XL) 2.5 MG 24 hr tablet Take 1 tablet (2.5 mg) by mouth daily 30 tablet 0     oxyCODONE IR (ROXICODONE) 5 MG tablet Take 1-2 tablets (5-10 mg) by mouth every 3 hours as needed for breakthrough pain or moderate to severe pain 30 tablet 0     lisinopril (PRINIVIL/ZESTRIL) 2.5 MG tablet Take 1 tablet (2.5 mg) by mouth daily 90 tablet 1     acetaminophen (TYLENOL) 325 MG tablet Take 2 tablets (650 mg) by mouth every 4 hours as needed for other (multimodal surgical pain management along with NSAIDS and opioid medication as indicated based on pain control and physical function.) 100 tablet      polyethylene glycol (MIRALAX/GLYCOLAX) Packet Take 17 g by mouth daily as needed for constipation 30 packet 1     metFORMIN (GLUCOPHAGE) 500 MG tablet TAKE TWO TABLETS BY MOUTH TWICE A DAY WITH MEALS 120 tablet 0     gabapentin (NEURONTIN) 300 MG capsule Take 1 capsule (300 mg) by mouth 3 times daily 270 capsule 1     aspirin 81 MG tablet Take 1 tablet (81 mg) by mouth daily 90 tablet 3     blood glucose monitoring (NO BRAND SPECIFIED) meter device kit Use to test blood sugar one times daily or as directed. 1 kit 0     [DISCONTINUED] glipiZIDE (GLIPIZIDE XL) 2.5 MG 24 hr tablet Take 1 tablet (2.5 mg) by mouth daily 30 tablet 0     [DISCONTINUED] lisinopril (PRINIVIL/ZESTRIL) 2.5 MG tablet Take 1 tablet (2.5 mg) by mouth daily 90 tablet 1     No Known Allergies  Recent Labs   Lab Test  01/30/18   1456  01/18/18    "1111  01/15/18   0737   01/14/18   0505  01/13/18   0126  06/12/17   1637  03/09/17   1753  12/16/16   1436  11/07/16   1651  03/13/15   0822   02/18/15   1430   A1C  6.7*   --    --    --    --    --   5.8  6.3*   --   10.3*   --    < >   --    LDL   --    --    --    --    --    --    --    --   63   --   66   --    --    HDL   --    --    --    --    --    --    --    --   65   --   64   --    --    TRIG   --    --    --    --    --    --    --    --   104   --   117   --    --    ALT   --    --    --    --    --   107*   --    --    --   57   --    --   58   CR   --    --   0.55*   --   0.57*  0.71   --    --    --   0.80   --    --   0.59*   GFRESTIMATED   --    --   >90   --   >90  >90   --    --    --   >90  Non  GFR Calc     --    --   >90  Non  GFR Calc     GFRESTBLACK   --    --   >90   --   >90  >90   --    --    --   >90   GFR Calc     --    --   >90   GFR Calc     POTASSIUM   --   3.6  3.5   < >  3.3*  3.7   --    --    --   4.3   --    --   3.8   TSH   --    --    --    --    --    --    --   3.28   --    --    --    --   1.13    < > = values in this interval not displayed.      BP Readings from Last 3 Encounters:   01/30/18 134/86   01/18/18 137/79   01/13/18 129/72    Wt Readings from Last 3 Encounters:   01/30/18 242 lb (109.8 kg)   01/14/18 251 lb 5.2 oz (114 kg)   01/13/18 250 lb (113.4 kg)                  Labs reviewed in EPIC    ROS:  Constitutional, HEENT, cardiovascular, pulmonary, GI, , musculoskeletal, neuro, skin, endocrine and psych systems are negative, except as otherwise noted.    OBJECTIVE:     /86 (BP Location: Right arm, Patient Position: Chair, Cuff Size: Adult Large)  Pulse 102  Temp 97.9  F (36.6  C) (Oral)  Resp 16  Ht 5' 9\" (1.753 m)  Wt 242 lb (109.8 kg)  SpO2 96%  BMI 35.74 kg/m2  Body mass index is 35.74 kg/(m^2).   Physical Exam   Constitutional: He is oriented to person, place, and time. He " appears well-developed and well-nourished.   HENT:   Head: Normocephalic and atraumatic.   Right Ear: External ear normal.   Left Ear: External ear normal.   Mouth/Throat: Oropharynx is clear and moist.   Eyes: EOM are normal.   Neck: Neck supple.   Cardiovascular: Normal rate and regular rhythm.    Pulmonary/Chest: Effort normal and breath sounds normal.   Abdominal: Soft. Bowel sounds are normal.   Wound VAC in place   Musculoskeletal: Normal range of motion.   Neurological: He is alert and oriented to person, place, and time.   Psychiatric: He has a normal mood and affect.         Diagnostic Test Results:  none     ASSESSMENT/PLAN:     Problem List Items Addressed This Visit     Type 2 diabetes, HbA1c goal < 7% (H) - Primary     -Last a1-6.4  -Home fasting Blood sugars-blood sugars are very random 40 ranging from 100-300's  -Last eye exam- Not UTD- Patient to schedule  -Diabetic foot exam - mild neuropathy. Continue gabapentin  -Last urine microalbumin-On ACE  -Last LDL-Continue statin  -Continue metformin thousand milligrams twice a day.  Will add a small dose of glipizide 2.5 mg extended release to help control postprandial sugars.    -Discussed diet , lifestyle modifications, exercise and weight loss  -RTC in 1 months for follow up             Relevant Medications    blood glucose monitoring (NO BRAND SPECIFIED) test strip    blood glucose (NO BRAND SPECIFIED) lancets standard    glipiZIDE (GLIPIZIDE XL) 2.5 MG 24 hr tablet    lisinopril (PRINIVIL/ZESTRIL) 2.5 MG tablet    Other Relevant Orders    HEMOGLOBIN A1C (Completed)    Lipid panel reflex to direct LDL Fasting (Completed)    Basic metabolic panel (Completed)    Albumin Random Urine Quantitative with Creat Ratio (Completed)    Perineal abscess     Patient has a wound VAC in place.  I did add glipizide to help control his blood sugars better  to help  accelerate his recovery from the abscess  He has been using oxycodone as needed for wound VAC changes and  for doctor's visits  Discussed against overuse.  Refill oxycodone  Recheck in 1 month for close follow-up.         Epidermal cyst of face     Referred to surgery         Relevant Orders    GENERAL SURG ADULT REFERRAL      Other Visit Diagnoses     Screening for diabetic retinopathy        Need for prophylactic vaccination and inoculation against influenza        Need for prophylactic vaccination against Streptococcus pneumoniae (pneumococcus)        Perirectal abscess        Relevant Medications    oxyCODONE IR (ROXICODONE) 5 MG tablet           Lawanda Barrera MD  [facility]

## 2018-01-30 ENCOUNTER — OFFICE VISIT (OUTPATIENT)
Dept: FAMILY MEDICINE | Facility: OTHER | Age: 43
End: 2018-01-30
Payer: COMMERCIAL

## 2018-01-30 ENCOUNTER — TELEPHONE (OUTPATIENT)
Dept: SURGERY | Facility: CLINIC | Age: 43
End: 2018-01-30

## 2018-01-30 VITALS
DIASTOLIC BLOOD PRESSURE: 86 MMHG | BODY MASS INDEX: 35.84 KG/M2 | HEIGHT: 69 IN | RESPIRATION RATE: 16 BRPM | WEIGHT: 242 LBS | OXYGEN SATURATION: 96 % | HEART RATE: 102 BPM | TEMPERATURE: 97.9 F | SYSTOLIC BLOOD PRESSURE: 134 MMHG

## 2018-01-30 DIAGNOSIS — K61.1 PERIRECTAL ABSCESS: ICD-10-CM

## 2018-01-30 DIAGNOSIS — Z23 NEED FOR PROPHYLACTIC VACCINATION AND INOCULATION AGAINST INFLUENZA: ICD-10-CM

## 2018-01-30 DIAGNOSIS — E11.8 TYPE 2 DIABETES MELLITUS WITH COMPLICATION, WITHOUT LONG-TERM CURRENT USE OF INSULIN (H): ICD-10-CM

## 2018-01-30 DIAGNOSIS — L72.0 EPIDERMAL CYST OF FACE: ICD-10-CM

## 2018-01-30 DIAGNOSIS — E11.9 TYPE 2 DIABETES MELLITUS WITHOUT COMPLICATION, WITHOUT LONG-TERM CURRENT USE OF INSULIN (H): ICD-10-CM

## 2018-01-30 DIAGNOSIS — E11.69 TYPE 2 DIABETES MELLITUS WITH OTHER SPECIFIED COMPLICATION, UNSPECIFIED LONG TERM INSULIN USE STATUS: Primary | ICD-10-CM

## 2018-01-30 DIAGNOSIS — Z13.5 SCREENING FOR DIABETIC RETINOPATHY: ICD-10-CM

## 2018-01-30 DIAGNOSIS — Z23 NEED FOR PROPHYLACTIC VACCINATION AGAINST STREPTOCOCCUS PNEUMONIAE (PNEUMOCOCCUS): ICD-10-CM

## 2018-01-30 DIAGNOSIS — L02.215 PERINEAL ABSCESS: ICD-10-CM

## 2018-01-30 LAB
ANION GAP SERPL CALCULATED.3IONS-SCNC: 9 MMOL/L (ref 3–14)
BUN SERPL-MCNC: 13 MG/DL (ref 7–30)
CALCIUM SERPL-MCNC: 8.7 MG/DL (ref 8.5–10.1)
CHLORIDE SERPL-SCNC: 105 MMOL/L (ref 94–109)
CHOLEST SERPL-MCNC: 221 MG/DL
CO2 SERPL-SCNC: 24 MMOL/L (ref 20–32)
CREAT SERPL-MCNC: 0.67 MG/DL (ref 0.66–1.25)
CREAT UR-MCNC: 215 MG/DL
GFR SERPL CREATININE-BSD FRML MDRD: >90 ML/MIN/1.7M2
GLUCOSE SERPL-MCNC: 204 MG/DL (ref 70–99)
HBA1C MFR BLD: 6.7 % (ref 4.3–6)
HDLC SERPL-MCNC: 67 MG/DL
LDLC SERPL CALC-MCNC: ABNORMAL MG/DL
LDLC SERPL DIRECT ASSAY-MCNC: 119 MG/DL
MICROALBUMIN UR-MCNC: 21 MG/L
MICROALBUMIN/CREAT UR: 9.72 MG/G CR (ref 0–17)
NONHDLC SERPL-MCNC: 154 MG/DL
POTASSIUM SERPL-SCNC: 4.1 MMOL/L (ref 3.4–5.3)
SODIUM SERPL-SCNC: 138 MMOL/L (ref 133–144)
TRIGL SERPL-MCNC: 505 MG/DL

## 2018-01-30 PROCEDURE — 99214 OFFICE O/P EST MOD 30 MIN: CPT | Performed by: FAMILY MEDICINE

## 2018-01-30 PROCEDURE — 80048 BASIC METABOLIC PNL TOTAL CA: CPT | Performed by: FAMILY MEDICINE

## 2018-01-30 PROCEDURE — 83036 HEMOGLOBIN GLYCOSYLATED A1C: CPT | Performed by: FAMILY MEDICINE

## 2018-01-30 PROCEDURE — 80061 LIPID PANEL: CPT | Performed by: FAMILY MEDICINE

## 2018-01-30 PROCEDURE — 82043 UR ALBUMIN QUANTITATIVE: CPT | Performed by: FAMILY MEDICINE

## 2018-01-30 PROCEDURE — 36415 COLL VENOUS BLD VENIPUNCTURE: CPT | Performed by: FAMILY MEDICINE

## 2018-01-30 PROCEDURE — 83721 ASSAY OF BLOOD LIPOPROTEIN: CPT | Performed by: FAMILY MEDICINE

## 2018-01-30 RX ORDER — GLIPIZIDE 2.5 MG/1
2.5 TABLET, EXTENDED RELEASE ORAL DAILY
Qty: 30 TABLET | Refills: 0 | Status: SHIPPED | OUTPATIENT
Start: 2018-01-30 | End: 2018-01-30

## 2018-01-30 RX ORDER — GLIPIZIDE 2.5 MG/1
2.5 TABLET, EXTENDED RELEASE ORAL DAILY
Qty: 30 TABLET | Refills: 0 | Status: SHIPPED | OUTPATIENT
Start: 2018-01-30 | End: 2018-02-23

## 2018-01-30 RX ORDER — OXYCODONE HYDROCHLORIDE 5 MG/1
5-10 TABLET ORAL
Qty: 30 TABLET | Refills: 0 | Status: SHIPPED | OUTPATIENT
Start: 2018-01-30 | End: 2018-02-13

## 2018-01-30 RX ORDER — LISINOPRIL 2.5 MG/1
2.5 TABLET ORAL DAILY
Qty: 90 TABLET | Refills: 1 | Status: SHIPPED | OUTPATIENT
Start: 2018-01-30 | End: 2018-03-01

## 2018-01-30 ASSESSMENT — PAIN SCALES - GENERAL: PAINLEVEL: NO PAIN (0)

## 2018-01-30 NOTE — TELEPHONE ENCOUNTER
Established Patient Telephone Reminder Call    Date of call:  01/30/18  Phone numbers:  Home number on file [phone] (home)    Reached patient/confirmed appointment:  Yes  Appointment with:   Dr. Inder Antoine  Reason for visit:  Post op-wound check  Other: WOC appointment confirmed.

## 2018-01-30 NOTE — MR AVS SNAPSHOT
After Visit Summary   1/30/2018    Estevan Callejas    MRN: [id number]           Patient Information     Date Of Birth          1975        Visit Information        Provider Department      1/30/2018 3:00 PM Lawanda Barrera MD [facility]        Today's Diagnoses     Type 2 diabetes mellitus with other specified complication, unspecified long term insulin use status (H)    -  1    Screening for diabetic retinopathy        Need for prophylactic vaccination and inoculation against influenza        Need for prophylactic vaccination against Streptococcus pneumoniae (pneumococcus)        Epidermal cyst of face        Type 2 diabetes mellitus with complication, without long-term current use of insulin (H)        Perirectal abscess           Follow-ups after your visit        Additional Services     GENERAL SURG ADULT REFERRAL       Your provider has referred you to: [facility]: [facility] [phone]   [email or web address]    Please be aware that coverage of these services is subject to the terms and limitations of your health insurance plan.  Call member services at your health plan with any benefit or coverage questions.      Please bring the following with you to your appointment:    (1) Any X-Rays, CTs or MRIs which have been performed.  Contact the facility where they were done to arrange for  prior to your scheduled appointment.   (2) List of current medications   (3) This referral request   (4) Any documents/labs given to you for this referral                  Follow-up notes from your care team     Return in about 1 month (around 2/28/2018).      Your next 10 appointments already scheduled     Jan 31, 2018  8:30 AM CST   NEW WOUND NURSE VISIT with Christiano Calvillo RN   [facility] Wound Ostomy ([facility])    [address] [place] [address]  [facility] [address] [phone]            Jan 31, 2018   8:30 AM CST   (Arrive by 8:15 AM)   Post-Op with Inder Antoine MD   [facility] General Surgery ([facility])    [address] [place] [address]   [phone]            Feb 02, 2018 10:30 AM CST   RETURN WOUND NURSE VISIT with Patrica Johnson RN   [facility] Wound Ostomy ([facility])    [address] [place] [id number] [phone]            Feb 05, 2018  8:30 AM CST   RETURN WOUND NURSE VISIT with Patrica Johnson RN   [facility] Wound Ostomy ([facility])    [address] [place] [address] [phone]            Feb 07, 2018  8:30 AM CST   RETURN WOUND NURSE VISIT with Christiano Calvillo RN   [facility] Wound Ostomy ([facility])    [address] [place] [id number] [phone]            Feb 09, 2018  8:30 AM CST   RETURN WOUND NURSE VISIT with Patrica Johnson RN   [facility] Wound Ostomy ([facility])    [address] [place] [address] [phone]            Feb 12, 2018 11:00 AM CST   New Visit with Antwan Macias MD   [facility]    [address] [place] [address] [phone]            Feb 27, 2018  4:20 PM CST   Office Visit with Lawanda Barrera MD   [facility]    [address]  [facility] [id number] [phone]           Bring a current list of meds and any records pertaining to this visit. For Physicals, please bring immunization records and any forms needing to be filled out. Please arrive 10 minutes early to complete paperwork.              Future tests that were ordered for you today     Open Future Orders        Priority Expected Expires Ordered    Albumin Random Urine Quantitative with Creat Ratio Routine  "2018            Who to contact     If you have questions or need follow up information about today's clinic visit or your schedule please contact [facility] directly at [phone].  Normal or non-critical lab and imaging results will be communicated to you by MyChart, letter or phone within 4 business days after the clinic has received the results. If you do not hear from us within 7 days, please contact the clinic through MyChart or phone. If you have a critical or abnormal lab result, we will notify you by phone as soon as possible.  Submit refill requests through [employer] or call your pharmacy and they will forward the refill request to us. Please allow 3 business days for your refill to be completed.          Additional Information About Your Visit        [employer] Information     [employer] lets you send messages to your doctor, view your test results, renew your prescriptions, schedule appointments and more. To sign up, go to [email or web address] . Click on \"Log in\" on the left side of the screen, which will take you to the Welcome page. Then click on \"Sign up Now\" on the right side of the page.     You will be asked to enter the access code listed below, as well as some personal information. Please follow the directions to create your username and password.     Your access code is: [email or web address]  Expires: 2018 11:42 AM     Your access code will  in 90 days. If you need help or a new code, please call your [place] clinic or [phone].        Care EveryWhere ID     This is your Care EveryWhere ID. This could be used by other organizations to access your [place] medical records  [id number]        Your Vitals Were     Pulse Temperature Respirations Height Pulse Oximetry BMI (Body Mass Index)    102 97.9  F (36.6  C) (Oral) 16 5' 9\" (1.753 m) 96% 35.74 kg/m2       Blood Pressure from Last 3 Encounters:   18 134/86   18 137/79   18 129/72    " Weight from Last 3 Encounters:   01/30/18 242 lb (109.8 kg)   01/14/18 251 lb 5.2 oz (114 kg)   01/13/18 250 lb (113.4 kg)              We Performed the Following     Basic metabolic panel     GENERAL SURG ADULT REFERRAL     HEMOGLOBIN A1C     Lipid panel reflex to direct LDL Fasting          Today's Medication Changes          These changes are accurate as of 1/30/18  3:39 PM.  If you have any questions, ask your nurse or doctor.               Start taking these medicines.        Dose/Directions    glipiZIDE 2.5 MG 24 hr tablet   Commonly known as:  glipiZIDE XL   Used for:  Type 2 diabetes mellitus with other specified complication, unspecified long term insulin use status (H)   Started by:  Lawanda Barrera MD        Dose:  2.5 mg   Take 1 tablet (2.5 mg) by mouth daily   Quantity:  30 tablet   Refills:  0         These medicines have changed or have updated prescriptions.        Dose/Directions    blood glucose monitoring test strip   Commonly known as:  no brand specified   This may have changed:  additional instructions   Used for:  Type 2 diabetes mellitus with complication, without long-term current use of insulin (H)   Changed by:  Lawanda Barrera MD        Use to test blood sugars  3 times daily in am(reason- uncontrolled sugars with recent infections)   Quantity:  90 each   Refills:  3            Where to get your medicines      These medications were sent to [facility] - [address]   [address] [place] [address]     Phone:  [phone]     blood glucose lancets standard    blood glucose monitoring test strip    glipiZIDE 2.5 MG 24 hr tablet         Some of these will need a paper prescription and others can be bought over the counter.  Ask your nurse if you have questions.     Bring a paper prescription for each of these medications     oxyCODONE IR 5 MG tablet                Primary Care Provider Office Phone # Fax #    Lawanda Barrera MD  [phone]       [address] [facility] [address]  [facility] [address]        Equal Access to Services     DAVID JORGE : Alanna Acuna, danielkrysta houstonteteha, brandonmaria r wallsabelardo danielhoda, elisabeth aichain hayaabam sanchezsarah coreas maame miller. So [facility] [phone].    ATENCIÓN: Si habla español, tiene a parker disposición servicios gratuitos de asistencia lingüística. Llame al [phone].    We comply with applicable federal civil rights laws and [place] laws. We do not discriminate on the basis of race, color, national origin, age, disability, sex, sexual orientation, or gender identity.            Thank you!     Thank you for choosing [facility]  for your care. Our goal is always to provide you with excellent care. Hearing back from our patients is one way we can continue to improve our services. Please take a few minutes to complete the written survey that you may receive in the mail after your visit with us. Thank you!             Your Updated Medication List - Protect others around you: Learn how to safely use, store and throw away your medicines at www.disposemymeds.org.          This list is accurate as of 1/30/18  3:39 PM.  Always use your most recent med list.                   Brand Name Dispense Instructions for use Diagnosis    acetaminophen 325 MG tablet    TYLENOL    100 tablet    Take 2 tablets (650 mg) by mouth every 4 hours as needed for other (multimodal surgical pain management along with NSAIDS and opioid medication as indicated based on pain control and physical function.)    Perirectal abscess       aspirin 81 MG tablet     90 tablet    Take 1 tablet (81 mg) by mouth daily    Type 2 diabetes mellitus without complication, without long-term current use of insulin (H)       blood glucose lancets standard    no brand specified    100 each    Use to test blood sugar one times daily or as directed.    Type 2 diabetes mellitus with complication, without long-term current use of  insulin (H)       blood glucose monitoring meter device kit    no brand specified    1 kit    Use to test blood sugar one times daily or as directed.    Type 2 diabetes mellitus with complication, without long-term current use of insulin (H)       blood glucose monitoring test strip    no brand specified    90 each    Use to test blood sugars  3 times daily in am(reason- uncontrolled sugars with recent infections)    Type 2 diabetes mellitus with complication, without long-term current use of insulin (H)       gabapentin 300 MG capsule    NEURONTIN    270 capsule    Take 1 capsule (300 mg) by mouth 3 times daily    Type 2 diabetes mellitus without complication, without long-term current use of insulin (H)       glipiZIDE 2.5 MG 24 hr tablet    glipiZIDE XL    30 tablet    Take 1 tablet (2.5 mg) by mouth daily    Type 2 diabetes mellitus with other specified complication, unspecified long term insulin use status (H)       lisinopril 2.5 MG tablet    PRINIVIL/Zestril    90 tablet    Take 1 tablet (2.5 mg) by mouth daily    Type 2 diabetes mellitus without complication, without long-term current use of insulin (H)       metFORMIN 500 MG tablet    GLUCOPHAGE    120 tablet    TAKE TWO TABLETS BY MOUTH TWICE A DAY WITH MEALS    Type 2 diabetes mellitus with complication, without long-term current use of insulin (H)       oxyCODONE IR 5 MG tablet    ROXICODONE    30 tablet    Take 1-2 tablets (5-10 mg) by mouth every 3 hours as needed for breakthrough pain or moderate to severe pain    Perirectal abscess       polyethylene glycol Packet    MIRALAX/GLYCOLAX    30 packet    Take 17 g by mouth daily as needed for constipation    Drug-induced constipation

## 2018-01-30 NOTE — ASSESSMENT & PLAN NOTE
Patient has a wound VAC in place.  I did add glipizide to help control his blood sugars better  to help  accelerate his recovery from the abscess  He has been using oxycodone as needed for wound VAC changes and for doctor's visits  Discussed against overuse.  Refill oxycodone  Recheck in 1 month for close follow-up.

## 2018-01-30 NOTE — ASSESSMENT & PLAN NOTE
-Last a1-6.4  -Home fasting Blood sugars-blood sugars are very random 40 ranging from 100-300's  -Last eye exam- Not UTD- Patient to schedule  -Diabetic foot exam - mild neuropathy. Continue gabapentin  -Last urine microalbumin-On ACE  -Last LDL-Continue statin  -Continue metformin thousand milligrams twice a day.  Will add a small dose of glipizide 2.5 mg extended release to help control postprandial sugars.    -Discussed diet , lifestyle modifications, exercise and weight loss  -RTC in 1 months for follow up

## 2018-01-30 NOTE — NURSING NOTE
"Chief Complaint   Patient presents with     Diabetes     Mass     Panel Management     Mychart, pneumovax, flu, lipid, a1c, microalbumin, eye exam, statin on med list       Initial /86 (BP Location: Right arm, Patient Position: Chair, Cuff Size: Adult Large)  Pulse 102  Temp 97.9  F (36.6  C) (Oral)  Resp 16  Ht 5' 9\" (1.753 m)  Wt 242 lb (109.8 kg)  SpO2 96%  BMI 35.74 kg/m2 Estimated body mass index is 35.74 kg/(m^2) as calculated from the following:    Height as of this encounter: 5' 9\" (1.753 m).    Weight as of this encounter: 242 lb (109.8 kg).  Medication Reconciliation: complete   Missy Chi CMA (AAMA)      "

## 2018-01-31 ENCOUNTER — OFFICE VISIT (OUTPATIENT)
Dept: WOUND CARE | Facility: CLINIC | Age: 43
End: 2018-01-31
Payer: COMMERCIAL

## 2018-01-31 ENCOUNTER — OFFICE VISIT (OUTPATIENT)
Dept: SURGERY | Facility: CLINIC | Age: 43
End: 2018-01-31
Payer: COMMERCIAL

## 2018-01-31 ENCOUNTER — TELEPHONE (OUTPATIENT)
Dept: FAMILY MEDICINE | Facility: OTHER | Age: 43
End: 2018-01-31

## 2018-01-31 VITALS
SYSTOLIC BLOOD PRESSURE: 124 MMHG | DIASTOLIC BLOOD PRESSURE: 83 MMHG | WEIGHT: 245.9 LBS | TEMPERATURE: 98.4 F | HEART RATE: 104 BPM | HEIGHT: 69 IN | OXYGEN SATURATION: 95 % | BODY MASS INDEX: 36.42 KG/M2

## 2018-01-31 DIAGNOSIS — T81.30XA DISRUPTION OF WOUND OF PERINEUM IN MALE: Primary | ICD-10-CM

## 2018-01-31 DIAGNOSIS — Z51.89 VISIT FOR WOUND CHECK: Primary | ICD-10-CM

## 2018-01-31 ASSESSMENT — PAIN SCALES - GENERAL: PAINLEVEL: SEVERE PAIN (6)

## 2018-01-31 NOTE — LETTER
1/31/2018       RE: Estevan Callejas  [address]   [facility] [address]     Dear Colleague,    Thank you for referring your patient, Estevan Callejas, to the [facility] GENERAL SURGERY at [facility]. Please see a copy of my visit note below.    Patient here for wound check. Doing well.  Tolerating diet.  No fevers/chills.  No problems with wound vac.  On exam, there is no sign of infection.  Good healing tissue.  Two nylon sutures to approximate skin from 2 weeks ago will be removed.    Discussed smoking cessation.  A/p - open perineal wound.  Continue with current managmeent.  Discussed potential for wound worsening, especially in setting of him being a smoker.  Questions answered.  He is understanding of discussion and agreeable with plan.    Again, thank you for allowing me to participate in the care of your patient.      Sincerely,    Inder Antoine MD

## 2018-01-31 NOTE — TELEPHONE ENCOUNTER
Reason for Call:  Medication or medication refill:    Do you use a [facility]?  Name of the pharmacy and phone number for the current request:  [facility] - [phone]    Name of the medication requested: oxyCODONE IR (ROXICODONE) 5 MG tablet    Other request: PA required for insurance to cover, amount of RX's exceeds daily limit    Can we leave a detailed message on this number? YES    Phone number patient can be reached at: Cell number on file:    No relevant phone numbers on file. [phone]        Best Time: any    Call taken on 1/31/2018 at 3:23 PM by Tila Garcia

## 2018-01-31 NOTE — TELEPHONE ENCOUNTER
PA Initiation    Medication: oxyCODONE IR (ROXICODONE) 5 MG tablet - initiated  Insurance Company: [employer] - Phone [phone] Fax [phone]  Pharmacy Filling the Rx: [facility] - [id number]  Filling Pharmacy Phone: [phone]  Filling Pharmacy Fax:    Start Date: 1/31/2018

## 2018-01-31 NOTE — PROGRESS NOTES
Patient here for wound check. Doing well.  Tolerating diet.  No fevers/chills.  No problems with wound vac.  On exam, there is no sign of infection.  Good healing tissue.  Two nylon sutures to approximate skin from 2 weeks ago will be removed.    Discussed smoking cessation.  A/p - open perineal wound.  Continue with current managmeent.  Discussed potential for wound worsening, especially in setting of him being a smoker.  Questions answered.  He is understanding of discussion and agreeable with plan.

## 2018-01-31 NOTE — LETTER
[address]  [facility] [address]  Phone: [phone]    February 12, 2018        Estevan Callejas  [address] [facility] [address]          To whom it may concern:    RE: Estevan Callejas    Patient has been under my care since  2016 . He has been recently diagnosed with a infection needing drainage and a wound vac placement which needs to be changed at frequent intervals which can be quite painful and he has been using percocet before these changes. I think its medically necessary to use this as he cannot use other pain meds like NSAIDs due to underlying diabetes.     Please contact me for questions or concerns.      Sincerely,    Lawanda Barrera MD

## 2018-01-31 NOTE — TELEPHONE ENCOUNTER
Prior Authorization Retail Medication Request  Medication/Dose: oxyCODONE IR (ROXICODONE) 5 MG tablet  Diagnosis and ICD code:   New/Renewal/Insurance Change PA:   Previously Tried and Failed Therapies:     Insurance ID (if provided):   Insurance Phone (if provided):     Any additional info from fax request:     If you received a fax notification from an outside Pharmacy:  [facility] - [phone]  Pharmacy #:  Pharmacy Fax:

## 2018-01-31 NOTE — MR AVS SNAPSHOT
After Visit Summary   1/31/2018    Estevan Callejas    MRN: [id number]           Patient Information     Date Of Birth          1975        Visit Information        Provider Department      1/31/2018 8:30 AM Inder Antoine MD [facility] General Surgery        Today's Diagnoses     Visit for wound check    -  1       Follow-ups after your visit        Your next 10 appointments already scheduled     Feb 02, 2018 10:30 AM CST   RETURN WOUND NURSE VISIT with Patrica Johnson RN   [facility] Wound Ostomy ([facility])    [address] [place] [address]   [phone]            Feb 05, 2018  8:30 AM CST   RETURN WOUND NURSE VISIT with Patrica Johnson RN   [facility] Wound Ostomy ([facility])    [address] [place] [address]   [phone]            Feb 07, 2018  8:30 AM CST   RETURN WOUND NURSE VISIT with Christiano Calvillo RN   [facility] Wound Ostomy ([facility])    [address] [place] [address]   [phone]            Feb 09, 2018  8:30 AM CST   RETURN WOUND NURSE VISIT with Patrica Johnson RN   [facility] Wound Ostomy ([facility])    [address] [place] [address]   [phone]            Feb 12, 2018 11:00 AM CST   New Visit with Antwan Macias MD   [facility] ([address] [id number] [phone]            Feb 27, 2018  4:20 PM CST   Office Visit with Lawanda Barrera MD   [facility] ([facility])    [address] [facility] [address]  [facility] [id number] [phone]           Bring a current list of meds and any records pertaining to this visit. For Physicals, please bring immunization records and any forms needing to be filled out. Please arrive 10 minutes early to complete  "paperwork.              Who to contact     Please call your clinic at [phone] to:    Ask questions about your health    Make or cancel appointments    Discuss your medicines    Learn about your test results    Speak to your doctor   If you have compliments or concerns about an experience at your clinic, or if you wish to file a complaint, please contact [facility] Patient Relations at [phone] or email us at [email or web address]         Additional Information About Your Visit        Vi[employer]ar[employer] Information     Phoodeez is an electronic gateway that provides easy, online access to your medical records. With Phoodeez, you can request a clinic appointment, read your test results, renew a prescription or communicate with your care team.     To sign up for Phoodeez visit the website at [email or web address]   You will be asked to enter the access code listed below, as well as some personal information. Please follow the directions to create your username and password.     Your access code is: [email or web address]  Expires: 2018 11:42 AM     Your access code will  in 90 days. If you need help or a new code, please contact your [facility] or call [phone] for assistance.        Care EveryWhere ID     This is your Care EveryWhere ID. This could be used by other organizations to access your [facility] medical records  [id number]        Your Vitals Were     Pulse Temperature Height Pulse Oximetry BMI (Body Mass Index)       104 98.4  F (36.9  C) (Oral) 1.753 m (5' 9\") 95% 36.31 kg/m2        Blood Pressure from Last 3 Encounters:   18 124/83   18 134/86   18 137/79    Weight from Last 3 Encounters:   18 111.5 kg (245 lb 14.4 oz)   18 109.8 kg (242 lb)   18 114 kg (251 lb 5.2 oz)              Today, you had the following     No orders found for display       Primary Care Provider Office Phone # Fax #    Lawanda " MD Holly [phone]       [address] [facility] [address]  [facility] [address]        Equal Access to Services     DAVID JORGE : Alanna Acuna, yane mcguire, elfego escobarmakrysta fernández, elisabeth holcombin haychavez sanchezsarah kikenorbertjefe miller. So [facility] [phone].    ATENCIÓN: Si habla español, tiene a parker disposición servicios gratuitos de asistencia lingüística. Llame al [phone].    We comply with applicable federal civil rights laws and [place] laws. We do not discriminate on the basis of race, color, national origin, age, disability, sex, sexual orientation, or gender identity.            Thank you!     Thank you for choosing [facility] SURGERY  for your care. Our goal is always to provide you with excellent care. Hearing back from our patients is one way we can continue to improve our services. Please take a few minutes to complete the written survey that you may receive in the mail after your visit with us. Thank you!             Your Updated Medication List - Protect others around you: Learn how to safely use, store and throw away your medicines at www.disposemymeds.org.          This list is accurate as of 1/31/18  9:25 AM.  Always use your most recent med list.                   Brand Name Dispense Instructions for use Diagnosis    acetaminophen 325 MG tablet    TYLENOL    100 tablet    Take 2 tablets (650 mg) by mouth every 4 hours as needed for other (multimodal surgical pain management along with NSAIDS and opioid medication as indicated based on pain control and physical function.)    Perirectal abscess       aspirin 81 MG tablet     90 tablet    Take 1 tablet (81 mg) by mouth daily    Type 2 diabetes mellitus without complication, without long-term current use of insulin (H)       blood glucose lancets standard    no brand specified    100 each    Use to test blood sugar one times daily or as directed.    Type 2 diabetes mellitus with complication, without long-term current  use of insulin (H)       blood glucose monitoring meter device kit    no brand specified    1 kit    Use to test blood sugar one times daily or as directed.    Type 2 diabetes mellitus with complication, without long-term current use of insulin (H)       blood glucose monitoring test strip    no brand specified    90 each    Use to test blood sugars  3 times daily in am(reason- uncontrolled sugars with recent infections)    Type 2 diabetes mellitus with complication, without long-term current use of insulin (H)       gabapentin 300 MG capsule    NEURONTIN    270 capsule    Take 1 capsule (300 mg) by mouth 3 times daily    Type 2 diabetes mellitus without complication, without long-term current use of insulin (H)       glipiZIDE 2.5 MG 24 hr tablet    glipiZIDE XL    30 tablet    Take 1 tablet (2.5 mg) by mouth daily    Type 2 diabetes mellitus with other specified complication, unspecified long term insulin use status (H)       lisinopril 2.5 MG tablet    PRINIVIL/Zestril    90 tablet    Take 1 tablet (2.5 mg) by mouth daily    Type 2 diabetes mellitus without complication, without long-term current use of insulin (H)       metFORMIN 500 MG tablet    GLUCOPHAGE    120 tablet    TAKE TWO TABLETS BY MOUTH TWICE A DAY WITH MEALS    Type 2 diabetes mellitus with complication, without long-term current use of insulin (H)       oxyCODONE IR 5 MG tablet    ROXICODONE    30 tablet    Take 1-2 tablets (5-10 mg) by mouth every 3 hours as needed for breakthrough pain or moderate to severe pain    Perirectal abscess       polyethylene glycol Packet    MIRALAX/GLYCOLAX    30 packet    Take 17 g by mouth daily as needed for constipation    Drug-induced constipation

## 2018-01-31 NOTE — NURSING NOTE
"Chief Complaint   Patient presents with     Surgical Followup     post op       Vitals:    01/31/18 0906   BP: 124/83   Pulse: 104   Temp: 98.4  F (36.9  C)   TempSrc: Oral   SpO2: 95%   Weight: 245 lb 14.4 oz   Height: 5' 9\"       Body mass index is 36.31 kg/(m^2).      Frances NOGUEIRA LPN                 "

## 2018-01-31 NOTE — PROGRESS NOTES
Patient comes to wound clinic for wound assessment per request of dr. Munroe. 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 Previous treatment includes: wound vac MW  This is treatment week:1    Objective findings:      Location: midline posterior scrotum and perineum     Wound measured.: 60 x 3 x 4 cm, Full thickness.    Deeper pocket in the lower posterior wall bringing total depth to 4 cm in that location    Wound description: no sign of infection    Tenderness:sometimes    Odor: none     much less inflammation    Drainage is moderate,      Wound Base: moist and granulation     Palpation of the wound bed:  normal    Periwound Skin: intact      Color: normal and consistent with surrounding tissue     Subjective finding:     Patient is assessed for discomfort which is: minimal    Today's status of the wound: improving    Treatment: Sutures removed per dr Antoine Wound cleanses with technicare NS soln, rinsed with NS, Patient is assessed for discomfort, which is minimal to moderate, and understanding of wound vac change procedure. Wound vac removed Wound Vac placement: Sterile gloves are donned.Wound is repacked with black foam and covered with adhesive film. 2 cm hole hole is cut in the adhesive film on top of which suction device is placed. Amount of sponges used are noted to be 2 . Vacuum pump is turned on  continuous suction at 125 mmHg and seal is confirmed. Instructions and warnings reiterated.       PLAN: Dressing changes 3 times/week in clinic. No changes to treatment at this time       Pt received the following instructions:    If wound vac fails and wound edges can not be sealed remove the vac and pack wound with moist gauze and abd pad. Signs and symptoms of infection taught.  Patient needs to be seen 2-3 days.   Treated and follow-up appointment made.      Arash was available for supervision of care if needed or if questions should arise regarding plan of care. Patrica Johnson RN CWON

## 2018-01-31 NOTE — MR AVS SNAPSHOT
After Visit Summary   1/31/2018    Estevan Callejas    MRN: [id number]           Patient Information     Date Of Birth          1975        Visit Information        Provider Department      1/31/2018 8:30 AM Patrica Johnson RN [facility] Wound Ostomy        Today's Diagnoses     Disruption of wound of perineum in male    -  1       Follow-ups after your visit        Your next 10 appointments already scheduled     Feb 02, 2018 10:30 AM CST   RETURN WOUND NURSE VISIT with Patrica Johnson RN   [facility] Wound Ostomy ([facility])    [address] [place] [address]   [phone]            Feb 05, 2018  8:30 AM CST   RETURN WOUND NURSE VISIT with Patrica Johnson RN   [facility] Wound Ostomy ([facility])    [address] [place] [address]   [phone]            Feb 07, 2018  8:30 AM CST   RETURN WOUND NURSE VISIT with Christiano Calvillo RN   [facility] Wound Ostomy ([facility])    [address] [place] [address]   [phone]            Feb 09, 2018  8:30 AM CST   RETURN WOUND NURSE VISIT with Patrica Johnson RN   [facility] Wound Ostomy ([facility])    [address] [place] [address]   [phone]            Feb 12, 2018 11:00 AM CST   New Visit with Antwan Macias MD   [facility] ([address] [phone]            Feb 27, 2018  4:20 PM CST   Office Visit with Lawanda Barrera MD   [facility] ([facility])    [address] [facility] [address]  [facility] [id number] [phone]           Bring a current list of meds and any records pertaining to this visit. For Physicals, please bring immunization records and any forms needing to be filled out. Please arrive 10 minutes early to  complete paperwork.              Who to contact     Please call your clinic at [phone] to:    Ask questions about your health    Make or cancel appointments    Discuss your medicines    Learn about your test results    Speak to your doctor   If you have compliments or concerns about an experience at your clinic, or if you wish to file a complaint, please contact [facility] Patient Relations at [phone] or email us at [email or web address]         Additional Information About Your Visit        FoodBuzz Information     FoodBuzz is an electronic gateway that provides easy, online access to your medical records. With FoodBuzz, you can request a clinic appointment, read your test results, renew a prescription or communicate with your care team.     To sign up for FoodBuzz visit the website at [email or web address]   You will be asked to enter the access code listed below, as well as some personal information. Please follow the directions to create your username and password.     Your access code is: [email or web address]  Expires: 2018 11:42 AM     Your access code will  in 90 days. If you need help or a new code, please contact your [facility] or call [phone] for assistance.        Care EveryWhere ID     This is your Care EveryWhere ID. This could be used by other organizations to access your [facility] medical records  [id number]         Blood Pressure from Last 3 Encounters:   18 124/83   18 134/86   18 137/79    Weight from Last 3 Encounters:   18 111.5 kg (245 lb 14.4 oz)   18 109.8 kg (242 lb)   18 114 kg (251 lb 5.2 oz)              Today, you had the following     No orders found for display       Primary Care Provider Office Phone # Fax #    Lawanda Barrera [phone]       [address] [facility] [address]  [facility] [address]        Equal Access to Services     DAVID JORGE : Alanna  jonas Acuna, yane houstonteteha, qajohnta kaabelardo danielhoda, waxjames jay stokesnorbertjefe isabel paul. So [facility] [phone].    ATENCIÓN: Si habla español, tiene a parker disposición servicios gratuitos de asistencia lingüística. Llame al [phone].    We comply with applicable federal civil rights laws and [place] laws. We do not discriminate on the basis of race, color, national origin, age, disability, sex, sexual orientation, or gender identity.            Thank you!     Thank you for choosing [facility] WOUND OSTOMY  for your care. Our goal is always to provide you with excellent care. Hearing back from our patients is one way we can continue to improve our services. Please take a few minutes to complete the written survey that you may receive in the mail after your visit with us. Thank you!             Your Updated Medication List - Protect others around you: Learn how to safely use, store and throw away your medicines at www.disposemymeds.org.          This list is accurate as of 1/31/18  9:39 AM.  Always use your most recent med list.                   Brand Name Dispense Instructions for use Diagnosis    acetaminophen 325 MG tablet    TYLENOL    100 tablet    Take 2 tablets (650 mg) by mouth every 4 hours as needed for other (multimodal surgical pain management along with NSAIDS and opioid medication as indicated based on pain control and physical function.)    Perirectal abscess       aspirin 81 MG tablet     90 tablet    Take 1 tablet (81 mg) by mouth daily    Type 2 diabetes mellitus without complication, without long-term current use of insulin (H)       blood glucose lancets standard    no brand specified    100 each    Use to test blood sugar one times daily or as directed.    Type 2 diabetes mellitus with complication, without long-term current use of insulin (H)       blood glucose monitoring meter device kit    no brand specified    1 kit    Use to test blood sugar one times daily or as  directed.    Type 2 diabetes mellitus with complication, without long-term current use of insulin (H)       blood glucose monitoring test strip    no brand specified    90 each    Use to test blood sugars  3 times daily in am(reason- uncontrolled sugars with recent infections)    Type 2 diabetes mellitus with complication, without long-term current use of insulin (H)       gabapentin 300 MG capsule    NEURONTIN    270 capsule    Take 1 capsule (300 mg) by mouth 3 times daily    Type 2 diabetes mellitus without complication, without long-term current use of insulin (H)       glipiZIDE 2.5 MG 24 hr tablet    glipiZIDE XL    30 tablet    Take 1 tablet (2.5 mg) by mouth daily    Type 2 diabetes mellitus with other specified complication, unspecified long term insulin use status (H)       lisinopril 2.5 MG tablet    PRINIVIL/Zestril    90 tablet    Take 1 tablet (2.5 mg) by mouth daily    Type 2 diabetes mellitus without complication, without long-term current use of insulin (H)       metFORMIN 500 MG tablet    GLUCOPHAGE    120 tablet    TAKE TWO TABLETS BY MOUTH TWICE A DAY WITH MEALS    Type 2 diabetes mellitus with complication, without long-term current use of insulin (H)       oxyCODONE IR 5 MG tablet    ROXICODONE    30 tablet    Take 1-2 tablets (5-10 mg) by mouth every 3 hours as needed for breakthrough pain or moderate to severe pain    Perirectal abscess       polyethylene glycol Packet    MIRALAX/GLYCOLAX    30 packet    Take 17 g by mouth daily as needed for constipation    Drug-induced constipation

## 2018-02-01 NOTE — TELEPHONE ENCOUNTER
Central Prior Authorization Team   Phone: [phone]      PRIOR AUTHORIZATION DENIED    Medication: oxyCODONE IR (ROXICODONE) 5 MG tablet - Denied    Denial Date: 1/31/2018    Denial Rational: Determined not medically necessary       Appeal Information:

## 2018-02-02 ENCOUNTER — OFFICE VISIT (OUTPATIENT)
Dept: WOUND CARE | Facility: CLINIC | Age: 43
End: 2018-02-02
Payer: COMMERCIAL

## 2018-02-02 ENCOUNTER — CARE COORDINATION (OUTPATIENT)
Dept: SURGERY | Facility: CLINIC | Age: 43
End: 2018-02-02

## 2018-02-02 DIAGNOSIS — Z53.9 ERRONEOUS ENCOUNTER--DISREGARD: Primary | ICD-10-CM

## 2018-02-02 DIAGNOSIS — T81.30XA DISRUPTION OF WOUND OF PERINEUM IN MALE: Primary | ICD-10-CM

## 2018-02-02 NOTE — PROGRESS NOTES
Patient comes to wound clinic for wound assessment per request of dr. Munroe. 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 Previous treatment includes: wound vac MW  This is treatment week:2    Objective findings: Before removing the fact the patient stated that he had increased drainage in his canister.  Which was unusual since it has decreased over the past 2 weeks.  He also stated that he had more itching on the scrotum and I observed increased erythema of the scrotum.  After removal of the sponge and cleaning the base of the wound it was noted that the small pocket along the posterior wall has some light-colored drainage which after exploration with a Q-tip turned out to be copious amount of drainage.  Dr. Munroe was called to the room and she assessed the wound and after probing the area more she suspected that this was an underlying abscess that now had drained.  Our plan is for this to be packed with strip gauze and continue to place the wound VAC on the remainder of the wound as previously.      Location: midline posterior scrotum and perineum     Wound measured.: 6.0 x 3 x 4 cm, Full thickness.    Deeper pocket in the lower posterior wall bringing total depth to  8 cm in that location. pocket itself is approximately 1.5 cm round    Tenderness:sometimes    Odor: none there was no odor of the drainage that came out of this smaller abscess which decreased our worries of this being of fistula towards the rectum    Increased inflammation compared to Wednesday    Drainage is moderate,      Wound Base: moist and granulation     Palpation of the wound bed:  normal    Periwound Skin: intact      Color: normal and consistent with surrounding tissue     Subjective finding:   Patient is assessed for discomfort which is: Patient states that he has had more itching in the area and  sensitivity.  Today's status of the wound: The wound which was improving up to today has now declined somewhat for the finding of this new abscess area.  We will be watchful for open connection to the rectum in this area.    Treatment: Sutures removed per dr Antoine Wound cleanses with technicare NS soln, rinsed with NS, Patient is assessed for discomfort, which is minimal to moderate, and understanding of wound vac change procedure. Wound vac removed the abscess was packed with 1/4 inch gauze and left ALT 40 wound VAC to be observed.  Wound Vac placement: Sterile gloves are donned.Wound is repacked with black foam and covered with adhesive film. 2 cm hole hole is cut in the adhesive film on top of which suction device is placed. Amount of sponges used are noted to be 3 . Vacuum pump is turned on  continuous suction at 125 mmHg and seal is confirmed. Instructions and warnings reiterated.       PLAN: Dressing changes 3 times/week in clinic. No changes to treatment at this time       Pt received the following instructions: Patient was instructed to assess the wound twice a day for increased redness or itching or pain and also to watch the output in the canister.  He should not have a canister filled within 1 day. If he does she needs to go to the ER  If wound vac fails and wound edges can not be sealed remove the vac and pack wound with moist gauze and abd pad. Signs and symptoms of infection taught.  Patient needs to be seen 2-3 days.   Treated and follow-up appointment made.    Dr Munroe was available for supervision of care if needed or if questions should arise regarding plan of care. Patrica Johnson RN CWON

## 2018-02-02 NOTE — MR AVS SNAPSHOT
After Visit Summary   2/2/2018    Estevan Callejas    MRN: [id number]           Patient Information     Date Of Birth          1975        Visit Information        Provider Department      2/2/2018 10:30 AM Patrica Johnson RN [facility]        Today's Diagnoses     Disruption of wound of perineum in male    -  1       Follow-ups after your visit        Your next 10 appointments already scheduled     Feb 05, 2018  8:30 AM CST   RETURN WOUND NURSE VISIT with [facility] ([facility])    [address] [place] [address]   [phone]            Feb 07, 2018  8:30 AM CST   RETURN WOUND NURSE VISIT with Christiano Calvillo RN   [facility]ound [facility] ([facility])    [address] [place] [id number] [phone]            Feb 09, 2018  8:30 AM CST   RETURN WOUND NURSE VISIT with Patrica Johnson RN   [facility] Wound [facility] ([facility])    [address] [place] [id number] [phone]            Feb 12, 2018 11:00 AM CST   New Visit with Antwan Macias MD   [facility] ([address] [address] [phone]            Feb 27, 2018  4:20 PM CST   Office Visit with Lawanda Barrera MD   [facility] ([facility])    [address] [facility] [address]  [facility] [id number] [phone]           Bring a current list of meds and any records pertaining to this visit. For Physicals, please bring immunization records and any forms needing to be filled out. Please arrive 10 minutes early to complete paperwork.              Who to contact     Please call your clinic at [phone] to:    Ask questions about your health    Make or cancel appointments    Discuss your medicines    Learn about your test results    Speak to your  doctor   If you have compliments or concerns about an experience at your clinic, or if you wish to file a complaint, please contact [facility] Physicians Patient Relations at [phone] or email us at [email or web address]         Additional Information About Your Visit        DecideQuickhar[employer] Information     Sumbolat is an electronic gateway that provides easy, online access to your medical records. With CommonFloor, you can request a clinic appointment, read your test results, renew a prescription or communicate with your care team.     To sign up for CommonFloor visit the website at [email or web address]   You will be asked to enter the access code listed below, as well as some personal information. Please follow the directions to create your username and password.     Your access code is: [email or web address]  Expires: 2018 11:42 AM     Your access code will  in 90 days. If you need help or a new code, please contact your [facility] or call [phone] for assistance.        Care EveryWhere ID     This is your Care EveryWhere ID. This could be used by other organizations to access your [place] medical records  [id number]         Blood Pressure from Last 3 Encounters:   18 124/83   18 134/86   18 137/79    Weight from Last 3 Encounters:   18 111.5 kg (245 lb 14.4 oz)   18 109.8 kg (242 lb)   18 114 kg (251 lb 5.2 oz)              Today, you had the following     No orders found for display       Primary Care Provider Office Phone # Fax #    Lawanda Barrera [phone]       [address] [facility] [address]  [facility] [address]        Equal Access to Services     DAVID JORGE : Alanna Acuna, yane mcguire, elfego fernández, elisabeth miller. So [facility] [phone].    ATENCIÓN: Si habla español, tiene a parker disposición servicios gratuitos de asistencia lingüística. Brandyn  al [phone].    We comply with applicable federal civil rights laws and [place] laws. We do not discriminate on the basis of race, color, national origin, age, disability, sex, sexual orientation, or gender identity.            Thank you!     Thank you for choosing [facility] WOUND OSTOMY  for your care. Our goal is always to provide you with excellent care. Hearing back from our patients is one way we can continue to improve our services. Please take a few minutes to complete the written survey that you may receive in the mail after your visit with us. Thank you!             Your Updated Medication List - Protect others around you: Learn how to safely use, store and throw away your medicines at www.disposemymeds.org.          This list is accurate as of 2/2/18  3:33 PM.  Always use your most recent med list.                   Brand Name Dispense Instructions for use Diagnosis    acetaminophen 325 MG tablet    TYLENOL    100 tablet    Take 2 tablets (650 mg) by mouth every 4 hours as needed for other (multimodal surgical pain management along with NSAIDS and opioid medication as indicated based on pain control and physical function.)    Perirectal abscess       aspirin 81 MG tablet     90 tablet    Take 1 tablet (81 mg) by mouth daily    Type 2 diabetes mellitus without complication, without long-term current use of insulin (H)       blood glucose lancets standard    no brand specified    100 each    Use to test blood sugar one times daily or as directed.    Type 2 diabetes mellitus with complication, without long-term current use of insulin (H)       blood glucose monitoring meter device kit    no brand specified    1 kit    Use to test blood sugar one times daily or as directed.    Type 2 diabetes mellitus with complication, without long-term current use of insulin (H)       blood glucose monitoring test strip    no brand specified    90 each    Use to test blood sugars  3 times daily in am(reason-  uncontrolled sugars with recent infections)    Type 2 diabetes mellitus with complication, without long-term current use of insulin (H)       gabapentin 300 MG capsule    NEURONTIN    270 capsule    Take 1 capsule (300 mg) by mouth 3 times daily    Type 2 diabetes mellitus without complication, without long-term current use of insulin (H)       glipiZIDE 2.5 MG 24 hr tablet    glipiZIDE XL    30 tablet    Take 1 tablet (2.5 mg) by mouth daily    Type 2 diabetes mellitus with other specified complication, unspecified long term insulin use status (H)       lisinopril 2.5 MG tablet    PRINIVIL/Zestril    90 tablet    Take 1 tablet (2.5 mg) by mouth daily    Type 2 diabetes mellitus without complication, without long-term current use of insulin (H)       metFORMIN 500 MG tablet    GLUCOPHAGE    120 tablet    TAKE TWO TABLETS BY MOUTH TWICE A DAY WITH MEALS    Type 2 diabetes mellitus with complication, without long-term current use of insulin (H)       oxyCODONE IR 5 MG tablet    ROXICODONE    30 tablet    Take 1-2 tablets (5-10 mg) by mouth every 3 hours as needed for breakthrough pain or moderate to severe pain    Perirectal abscess       polyethylene glycol Packet    MIRALAX/GLYCOLAX    30 packet    Take 17 g by mouth daily as needed for constipation    Drug-induced constipation

## 2018-02-05 ENCOUNTER — OFFICE VISIT (OUTPATIENT)
Dept: WOUND CARE | Facility: CLINIC | Age: 43
End: 2018-02-05
Payer: COMMERCIAL

## 2018-02-05 ENCOUNTER — TRANSFERRED RECORDS (OUTPATIENT)
Dept: HEALTH INFORMATION MANAGEMENT | Facility: CLINIC | Age: 43
End: 2018-02-05

## 2018-02-05 DIAGNOSIS — T81.30XA DISRUPTION OF WOUND OF PERINEUM IN MALE: Primary | ICD-10-CM

## 2018-02-05 LAB — RETINOPATHY: NEGATIVE

## 2018-02-05 NOTE — MR AVS SNAPSHOT
After Visit Summary   2/5/2018    Estevan Callejas    MRN: [id number]           Patient Information     Date Of Birth          1975        Visit Information        Provider Department      2/5/2018 8:30 AM Pat[facility] RN [facility]        Today's Diagnoses     Disruption of wound of perineum in male    -  1       Follow-ups after your visit        Your next 10 appointments already scheduled     Feb 07, 2018  8:30 AM CST   RETURN WOUND NURSE VISIT with [facility] ([facility])    [address] [place] [address]   [phone]            Feb 09, 2018  8:30 AM CST   RETURN WOUND NURSE VISIT with TIMMY [facility] ([facility])    [address] [place] [id number] [phone]            Feb 12, 2018 11:00 AM CST   New Visit with Antwan Macias MD   [facility] ([address] [address] [phone]            Feb 27, 2018  4:20 PM CST   Office Visit with Lawanda Barrera MD   [facility] ([facility])    [address] [place] [address] [phone]           Bring a current list of meds and any records pertaining to this visit. For Physicals, please bring immunization records and any forms needing to be filled out. Please arrive 10 minutes early to complete paperwork.              Who to contact     Please call your clinic at [phone] to:    Ask questions about your health    Make or cancel appointments    Discuss your medicines    Learn about your test results    Speak to your doctor   If you have compliments or concerns about an experience at your clinic, or if you wish to file a complaint, please contact [facility] Physicians Patient Relations at [phone] or email us at  [email or web address]         Additional Information About Your Visit        Pretio Interactivehart Information     Pretio Interactivehart is an electronic gateway that provides easy, online access to your medical records. With ApnaPaisa, you can request a clinic appointment, read your test results, renew a prescription or communicate with your care team.     To sign up for ApnaPaisa visit the website at [email or web address]   You will be asked to enter the access code listed below, as well as some personal information. Please follow the directions to create your username and password.     Your access code is: [email or web address]  Expires: 2018 11:42 AM     Your access code will  in 90 days. If you need help or a new code, please contact your [facility] or call [phone] for assistance.        Care EveryWhere ID     This is your Care EveryWhere ID. This could be used by other organizations to access your [place] medical records  [id number]         Blood Pressure from Last 3 Encounters:   18 124/83   18 134/86   18 137/79    Weight from Last 3 Encounters:   18 111.5 kg (245 lb 14.4 oz)   18 109.8 kg (242 lb)   18 114 kg (251 lb 5.2 oz)              Today, you had the following     No orders found for display       Primary Care Provider Office Phone # Fax #    Lawanda Barrera [phone]       [address] [place] [address]  [facility] [address]        Equal Access to Services     RUTH JORGE : Hadii jonas ku hadasho Soomaali, waaxda luqadaha, qaybta kaalmada adeegyada, elisabeth isabel . So [facility] [phone].    ATENCIÓN: Si habla español, tiene a parker disposición servicios gratuitos de asistencia lingüística. Llame al [phone].    We comply with applicable federal civil rights laws and [place] laws. We do not discriminate on the basis of race, color, national origin, age, disability, sex, sexual orientation, or gender  identity.            Thank you!     Thank you for choosing [facility] WOUND OSTOMY  for your care. Our goal is always to provide you with excellent care. Hearing back from our patients is one way we can continue to improve our services. Please take a few minutes to complete the written survey that you may receive in the mail after your visit with us. Thank you!             Your Updated Medication List - Protect others around you: Learn how to safely use, store and throw away your medicines at www.disposemymeds.org.          This list is accurate as of 2/5/18  9:04 AM.  Always use your most recent med list.                   Brand Name Dispense Instructions for use Diagnosis    acetaminophen 325 MG tablet    TYLENOL    100 tablet    Take 2 tablets (650 mg) by mouth every 4 hours as needed for other (multimodal surgical pain management along with NSAIDS and opioid medication as indicated based on pain control and physical function.)    Perirectal abscess       aspirin 81 MG tablet     90 tablet    Take 1 tablet (81 mg) by mouth daily    Type 2 diabetes mellitus without complication, without long-term current use of insulin (H)       blood glucose lancets standard    no brand specified    100 each    Use to test blood sugar one times daily or as directed.    Type 2 diabetes mellitus with complication, without long-term current use of insulin (H)       blood glucose monitoring meter device kit    no brand specified    1 kit    Use to test blood sugar one times daily or as directed.    Type 2 diabetes mellitus with complication, without long-term current use of insulin (H)       blood glucose monitoring test strip    no brand specified    90 each    Use to test blood sugars  3 times daily in am(reason- uncontrolled sugars with recent infections)    Type 2 diabetes mellitus with complication, without long-term current use of insulin (H)       gabapentin 300 MG capsule    NEURONTIN    270 capsule    Take 1 capsule (300 mg)  by mouth 3 times daily    Type 2 diabetes mellitus without complication, without long-term current use of insulin (H)       glipiZIDE 2.5 MG 24 hr tablet    glipiZIDE XL    30 tablet    Take 1 tablet (2.5 mg) by mouth daily    Type 2 diabetes mellitus with other specified complication, unspecified long term insulin use status (H)       lisinopril 2.5 MG tablet    PRINIVIL/Zestril    90 tablet    Take 1 tablet (2.5 mg) by mouth daily    Type 2 diabetes mellitus without complication, without long-term current use of insulin (H)       metFORMIN 500 MG tablet    GLUCOPHAGE    120 tablet    TAKE TWO TABLETS BY MOUTH TWICE A DAY WITH MEALS    Type 2 diabetes mellitus with complication, without long-term current use of insulin (H)       oxyCODONE IR 5 MG tablet    ROXICODONE    30 tablet    Take 1-2 tablets (5-10 mg) by mouth every 3 hours as needed for breakthrough pain or moderate to severe pain    Perirectal abscess       polyethylene glycol Packet    MIRALAX/GLYCOLAX    30 packet    Take 17 g by mouth daily as needed for constipation    Drug-induced constipation

## 2018-02-05 NOTE — PROGRESS NOTES
"Patient comes to wound clinic for wound assessment per request of dr. Munroe. 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 Previous treatment includes: wound vac [facility]  This is treatment week: 3    Objective findings: stable wound    Location: midline posterior scrotum and perineum     Wound measured.: 6.0 x 3 x 4 cm, Full thickness.    Deeper pocket in the lower posterior wall bringing total depth to  6 cm in that location. pocket itself is approximately 1.5 cm around    Tenderness:sometimes    Odor: none    Drainage is moderate,  Bloody in the canister    Wound Base: moist and granulation     Palpation of the wound bed:  normal    Periwound Skin: intact      Color: normal and consistent with surrounding tissue     Subjective finding:   Patient is assessed for discomfort which is: none    Today's status of the wound:  StableWe will be watchful for open connection to the rectum in this area.    Treatment:  Wound vac Dressing and 1/4\" strip gauze removed, Wound cleanses with [employer] , rinsed with NS, Patient is assessed for discomfort, which is minimal to moderate, and understanding of wound vac change procedure. Wound vac removed the abscess was packed with 1/4 inch gauze.  Wound Vac placement: Sterile gloves are donned.Wound is repacked with black foam and covered with adhesive film. 2 cm hole hole is cut in the adhesive film on top of which suction device is placed. Amount of sponges used are noted to be 3 . Vacuum pump is turned on  continuous suction at 125 mmHg and seal is confirmed. Instructions and warnings reiterated.       PLAN: Dressing changes 3 times/week in clinic. No changes to treatment at this time       Pt received the following instructions: Patient was instructed to assess the wound twice a day for increased redness or itching or pain and also to watch the " output in the canister.  He should not have a canister filled within 1 day. If he does she needs to go to the ER. Disability paperwork filled out. ot should prevent sitting  If wound vac fails and wound edges can not be sealed remove the vac and pack wound with moist gauze and abd pad. Signs and symptoms of infection taught.  Patient needs to be seen 2-3 days.   Treated and follow-up appointment made.    Dr Man was available for supervision of care if needed or if questions should arise regarding plan of care. Patrica Johnson RN [facility]

## 2018-02-07 ENCOUNTER — OFFICE VISIT (OUTPATIENT)
Dept: WOUND CARE | Facility: CLINIC | Age: 43
End: 2018-02-07
Payer: COMMERCIAL

## 2018-02-07 DIAGNOSIS — T81.30XA DISRUPTION OF WOUND OF PERINEUM IN MALE: Primary | ICD-10-CM

## 2018-02-07 NOTE — MR AVS SNAPSHOT
After Visit Summary   2/7/2018    Estevan Callejas    MRN: [id number]           Patient Information     Date Of Birth          1975        Visit Information        Provider Department      2/7/2018 8:30 AM Christiano Calvillo RN [facility]        Today's Diagnoses     Disruption of wound of perineum in male    -  1       Follow-ups after your visit        Your next 10 appointments already scheduled     Feb 09, 2018  8:30 AM CST   RETURN WOUND NURSE VISIT with TIMMY [facility] ([facility])    [address]  4th Floor  [facility] [address] [phone]            Feb 12, 2018 11:00 AM CST   New Visit with Antwan Macias MD   [facility] ([facility])    [address] [place] [address] [phone]            Feb 27, 2018  4:20 PM CST   Office Visit with Lawanda Barrera MD   [facility] ([facility])    [address] [facility] [address]  [facility] [phone] [phone]           Bring a current list of meds and any records pertaining to this visit. For Physicals, please bring immunization records and any forms needing to be filled out. Please arrive 10 minutes early to complete paperwork.              Who to contact     Please call your clinic at [phone] to:    Ask questions about your health    Make or cancel appointments    Discuss your medicines    Learn about your test results    Speak to your doctor   If you have compliments or concerns about an experience at your clinic, or if you wish to file a complaint, please contact [facility] Physicians Patient Relations at [phone] or email us at [email or web address]         Additional Information About Your Visit        OndaViahart Information     [employer] is an electronic gateway that provides easy, online access to your medical records. With [employer], you can request a clinic  appointment, read your test results, renew a prescription or communicate with your care team.     To sign up for Eyenalyzehart visit the website at [email or web address]   You will be asked to enter the access code listed below, as well as some personal information. Please follow the directions to create your username and password.     Your access code is: [email or web address]  Expires: 2018 11:42 AM     Your access code will  in 90 days. If you need help or a new code, please contact your [facility] or call [phone] for assistance.        Care EveryWhere ID     This is your Care EveryWhere ID. This could be used by other organizations to access your [place] medical records  [id number]         Blood Pressure from Last 3 Encounters:   18 124/83   18 134/86   18 137/79    Weight from Last 3 Encounters:   18 111.5 kg (245 lb 14.4 oz)   18 109.8 kg (242 lb)   18 114 kg (251 lb 5.2 oz)              Today, you had the following     No orders found for display       Primary Care Provider Office Phone # Fax #    Lawanda Barrera [phone]       [address] [place] [address]        Equal Access to Services     DAVID JORGE : Hadii aad ku hadasho Soomaali, waaxda luqadaha, qaybta kaalmada adeegyada, elisabeth isabel . So [facility] [phone].    ATENCIÓN: Si habla español, tiene a parker disposición servicios gratuitos de asistencia lingüística. Llame al [phone].    We comply with applicable federal civil rights laws and [place] laws. We do not discriminate on the basis of race, color, national origin, age, disability, sex, sexual orientation, or gender identity.            Thank you!     Thank you for choosing [facility] WOUND OSTOMY  for your care. Our goal is always to provide you with excellent care. Hearing back from our patients is one way we can continue to improve our services. Please take  a few minutes to complete the written survey that you may receive in the mail after your visit with us. Thank you!             Your Updated Medication List - Protect others around you: Learn how to safely use, store and throw away your medicines at www.disposemymeds.org.          This list is accurate as of 2/7/18 10:22 AM.  Always use your most recent med list.                   Brand Name Dispense Instructions for use Diagnosis    acetaminophen 325 MG tablet    TYLENOL    100 tablet    Take 2 tablets (650 mg) by mouth every 4 hours as needed for other (multimodal surgical pain management along with NSAIDS and opioid medication as indicated based on pain control and physical function.)    Perirectal abscess       aspirin 81 MG tablet     90 tablet    Take 1 tablet (81 mg) by mouth daily    Type 2 diabetes mellitus without complication, without long-term current use of insulin (H)       blood glucose lancets standard    no brand specified    100 each    Use to test blood sugar one times daily or as directed.    Type 2 diabetes mellitus with complication, without long-term current use of insulin (H)       blood glucose monitoring meter device kit    no brand specified    1 kit    Use to test blood sugar one times daily or as directed.    Type 2 diabetes mellitus with complication, without long-term current use of insulin (H)       blood glucose monitoring test strip    no brand specified    90 each    Use to test blood sugars  3 times daily in am(reason- uncontrolled sugars with recent infections)    Type 2 diabetes mellitus with complication, without long-term current use of insulin (H)       gabapentin 300 MG capsule    NEURONTIN    270 capsule    Take 1 capsule (300 mg) by mouth 3 times daily    Type 2 diabetes mellitus without complication, without long-term current use of insulin (H)       glipiZIDE 2.5 MG 24 hr tablet    glipiZIDE XL    30 tablet    Take 1 tablet (2.5 mg) by mouth daily    Type 2 diabetes  mellitus with other specified complication, unspecified long term insulin use status (H)       lisinopril 2.5 MG tablet    PRINIVIL/Zestril    90 tablet    Take 1 tablet (2.5 mg) by mouth daily    Type 2 diabetes mellitus without complication, without long-term current use of insulin (H)       metFORMIN 500 MG tablet    GLUCOPHAGE    120 tablet    TAKE TWO TABLETS BY MOUTH TWICE A DAY WITH MEALS    Type 2 diabetes mellitus with complication, without long-term current use of insulin (H)       oxyCODONE IR 5 MG tablet    ROXICODONE    30 tablet    Take 1-2 tablets (5-10 mg) by mouth every 3 hours as needed for breakthrough pain or moderate to severe pain    Perirectal abscess       polyethylene glycol Packet    MIRALAX/GLYCOLAX    30 packet    Take 17 g by mouth daily as needed for constipation    Drug-induced constipation

## 2018-02-07 NOTE — TELEPHONE ENCOUNTER
Provider please advise if further action is needed, thanks  Deysi Francisco RT (R)/   Hi,   This medication was denied. If the provider would like to appeal please provide a letter of medical necessity and route back to the team. Otherwise you can close the encounter.   Thank you,   [facility] Team   (Routing comment)

## 2018-02-07 NOTE — PROGRESS NOTES
"Patient comes to wound clinic for wound assessment per request of Dr. Munroe. 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 Previous treatment includes: wound vac MW  This is treatment week: 3    Objective findings: stable wound    Location: midline posterior scrotum and perineum     Wound measured.: 6.0 x 3 x 3.25 cm, Full thickness.    Deeper pocket in the lower posterior wall bringing total depth to  5 cm in that location. pocket itself is approximately 1.5 cm around    Tenderness:sometimes    Odor: none    Drainage is scant amt serosanguinous    Wound Base: moist and granulation     Palpation of the wound bed:  normal    Periwound Skin: intact      Color: normal and consistent with surrounding tissue     Subjective finding:   Patient is assessed for discomfort which is: none    Today's status of the wound:  improving    Treatment:  Wound vac Dressing and 1/4\" strip gauze removed, Wound cleanses with Technicare , rinsed with NS, Patient is assessed for discomfort, which is minimal, and understanding of wound vac change procedure. Wound vac removed the tunnel was packed with 1/4 inch gauze.  Wound Vac placement: Sterile gloves are donned.Wound is repacked with 1piece of black foam and covered with adhesive film. 2 cm hole hole is cut in the adhesive film on top of which a piece of foam was placed to pad betweem trac pad and skin, trac pad placed on top of foam pad. Amount of sponges used are noted to be 2 . Vacuum pump is turned on  continuous suction at 125 mmHg and seal is confirmed. Instructions and warnings reiterated.       PLAN: Dressing changes 3 times/week in clinic. No changes to treatment at this time       Pt received the following instructions: Patient was instructed to assess the wound twice a day for increased redness or itching or pain and also to watch the " output in the canister. He should not have a canister filled within 1 day. If he does she needs to go to the ER. Disability paperwork filled out. ot should prevent sitting. If wound vac fails and wound edges can not be sealed remove the vac and pack wound with moist gauze and abd pad. Signs and symptoms of infection taught. Patient needs to be seen 2-3 days. Treated and follow-up appointment made.      Dr Kong was available for supervision of care if needed or if questions should arise regarding plan of care.     Christiano Calvillo RN CWON

## 2018-02-08 NOTE — TELEPHONE ENCOUNTER
I spoke to his insurance company and they said NO it wouldn't be covered. She told me he can get a 7 day supply for oxycodone twice a month. Is all they would approve

## 2018-02-09 ENCOUNTER — OFFICE VISIT (OUTPATIENT)
Dept: WOUND CARE | Facility: CLINIC | Age: 43
End: 2018-02-09
Payer: COMMERCIAL

## 2018-02-09 DIAGNOSIS — T81.30XA DISRUPTION OF WOUND OF PERINEUM IN MALE: Primary | ICD-10-CM

## 2018-02-09 NOTE — MR AVS SNAPSHOT
After Visit Summary   2/9/2018    Estevan Callejas    MRN: [id number]           Patient Information     Date Of Birth          1975        Visit Information        Provider Department      2/9/2018 8:30 AM Patrica Johnson RN  [facility]        Today's Diagnoses     Disruption of wound of perineum in male    -  1       Follow-ups after your visit        Your next 10 appointments already scheduled     Feb 12, 2018  7:30 AM CST   RETURN WOUND NURSE VISIT with Patrica Johnson RN   [facility] Wound Ostomy ([facility])    [address] [place] [address]   [phone]            Feb 12, 2018 11:00 AM CST   New Visit with Antwan Macias MD   [facility] ([address] [address] [phone]            Feb 14, 2018  8:30 AM CST   RETURN WOUND NURSE VISIT with Christiano Calvillo RN   [facility] Wound Ostomy ([facility])    [address] [place] [address] [phone]            Feb 16, 2018  8:30 AM CST   RETURN WOUND NURSE VISIT with Patrica Johnson RN   [facility] Wound [facility]omy ([facility])    [address] [place] [address] [phone]            Feb 19, 2018  9:30 AM CST   RETURN WOUND NURSE VISIT with Patrica Johnson RN   [facility] Wound Ostomy ([facility])    [address] [place] [address] [phone]            Feb 21, 2018  7:30 AM CST   RETURN WOUND NURSE VISIT with Christiano Calvillo RN   [facility] Wound Ostomy ([facility])    [address] [place] [address] [phone]            Feb 23, 2018  8:30 AM CST   RETURN WOUND NURSE VISIT with Patrica Johnson RN   [facility] Wound Ostomy ([facility])    [address] [place] [address]  [facility] [address] [phone]            2018  4:20 PM CST   Office Visit with Lawanda Barrera MD   [facility] ([facility])    [address]  [facility] [address] [phone]           Bring a current list of meds and any records pertaining to this visit. For Physicals, please bring immunization records and any forms needing to be filled out. Please arrive 10 minutes early to complete paperwork.              Who to contact     Please call your clinic at [phone] to:    Ask questions about your health    Make or cancel appointments    Discuss your medicines    Learn about your test results    Speak to your doctor            Additional Information About Your Visit        MyChart Information     Play2Focus is an electronic gateway that provides easy, online access to your medical records. With Play2Focus, you can request a clinic appointment, read your test results, renew a prescription or communicate with your care team.     To sign up for [employer] visit the website at [email or web address]   You will be asked to enter the access code listed below, as well as some personal information. Please follow the directions to create your username and password.     Your access code is: [email or web address]  Expires: 2018 11:42 AM     Your access code will  in 90 days. If you need help or a new code, please contact your [facility] or call [phone] for assistance.        Care EveryWhere ID     This is your Care EveryWhere ID. This could be used by other organizations to access your [facility] medical records  [id number]         Blood Pressure from Last 3 Encounters:   18 124/83   18 134/86   18 137/79    Weight from Last 3 Encounters:   18 111.5 kg (245 lb 14.4 oz)   18 109.8 kg (242 lb)   18 114 kg (251 lb 5.2 oz)              Today, you had the following     No orders found for  display       Primary Care Provider Office Phone # Fax #    Lawanda Barrera [phone]       [address] [facility] [address]  [facility] [address]        Equal Access to Services     DAVID JORGE : Hadii aad ku hadrayshawnlast Kalpana, wabarbarada luqraymundo, qajohnta katwinda pradeep, elisabeth aichain hayaabam stokesnorbertjefe miller. So [facility] [phone].    ATENCIÓN: Si habla español, tiene a parker disposición servicios gratuitos de asistencia lingüística. Llame al [phone].    We comply with applicable federal civil rights laws and [place] laws. We do not discriminate on the basis of race, color, national origin, age, disability, sex, sexual orientation, or gender identity.            Thank you!     Thank you for choosing [facility] WOUND OSTOMY  for your care. Our goal is always to provide you with excellent care. Hearing back from our patients is one way we can continue to improve our services. Please take a few minutes to complete the written survey that you may receive in the mail after your visit with us. Thank you!             Your Updated Medication List - Protect others around you: Learn how to safely use, store and throw away your medicines at www.disposemymeds.org.          This list is accurate as of 2/9/18  9:00 AM.  Always use your most recent med list.                   Brand Name Dispense Instructions for use Diagnosis    acetaminophen 325 MG tablet    TYLENOL    100 tablet    Take 2 tablets (650 mg) by mouth every 4 hours as needed for other (multimodal surgical pain management along with NSAIDS and opioid medication as indicated based on pain control and physical function.)    Perirectal abscess       aspirin 81 MG tablet     90 tablet    Take 1 tablet (81 mg) by mouth daily    Type 2 diabetes mellitus without complication, without long-term current use of insulin (H)       blood glucose lancets standard    no brand specified    100 each    Use to test blood sugar one times daily or as directed.    Type 2  diabetes mellitus with complication, without long-term current use of insulin (H)       blood glucose monitoring meter device kit    no brand specified    1 kit    Use to test blood sugar one times daily or as directed.    Type 2 diabetes mellitus with complication, without long-term current use of insulin (H)       blood glucose monitoring test strip    no brand specified    90 each    Use to test blood sugars  3 times daily in am(reason- uncontrolled sugars with recent infections)    Type 2 diabetes mellitus with complication, without long-term current use of insulin (H)       gabapentin 300 MG capsule    NEURONTIN    270 capsule    Take 1 capsule (300 mg) by mouth 3 times daily    Type 2 diabetes mellitus without complication, without long-term current use of insulin (H)       glipiZIDE 2.5 MG 24 hr tablet    glipiZIDE XL    30 tablet    Take 1 tablet (2.5 mg) by mouth daily    Type 2 diabetes mellitus with other specified complication, unspecified long term insulin use status (H)       lisinopril 2.5 MG tablet    PRINIVIL/Zestril    90 tablet    Take 1 tablet (2.5 mg) by mouth daily    Type 2 diabetes mellitus without complication, without long-term current use of insulin (H)       metFORMIN 500 MG tablet    GLUCOPHAGE    120 tablet    TAKE TWO TABLETS BY MOUTH TWICE A DAY WITH MEALS    Type 2 diabetes mellitus with complication, without long-term current use of insulin (H)       oxyCODONE IR 5 MG tablet    ROXICODONE    30 tablet    Take 1-2 tablets (5-10 mg) by mouth every 3 hours as needed for breakthrough pain or moderate to severe pain    Perirectal abscess       polyethylene glycol Packet    MIRALAX/GLYCOLAX    30 packet    Take 17 g by mouth daily as needed for constipation    Drug-induced constipation

## 2018-02-09 NOTE — PROGRESS NOTES
"Patient comes to wound clinic for wound assessment per request of Dr. Munroe. 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 Previous treatment includes: wound vac [facility]  This is treatment week: 3    Objective findings: stable wound    Location: midline posterior scrotum and perineum     Wound measured.: 6.0 x 3 x 4 cm, Full thickness.    Deeper pocket in the lower posterior wall bringing total depth to  5 cm in that location. pocket itself is approximately 1.5 cm around    Tenderness:sometimes    Odor: none    Drainage is scant amt serosanguinous    Wound Base: moist and granulation     Palpation of the wound bed:  normal    Periwound Skin: intact      Color: normal and consistent with surrounding tissue     Subjective finding:   Patient is assessed for discomfort which is: none    Today's status of the wound:  improving    Treatment:  Wound vac Dressing and 1/4\" strip gauze removed, Wound cleanses with Technicare , rinsed with NS, Patient is assessed for discomfort, which is minimal, and understanding of wound vac change procedure. Wound vac removed the tunnel was cleansed with Technicare, Irrigated with saline and repacked with 1/4 inch gauze.  Wound Vac placement: Wound is repacked with 1piece of black foam and covered with adhesive film. 2 cm hole hole is cut in the adhesive film on top of which a piece of foam was placed to pad betweem trac pad and skin, trac pad placed on top of foam pad. Amount of sponges used are noted to be 2 . Vacuum pump is turned on  continuous suction at 125 mmHg and seal is confirmed. Instructions and warnings reiterated.       PLAN: Dressing changes 3 times/week in clinic. No changes to treatment at this time       Pt received the following instructions: Patient was instructed to assess the wound twice a day for increased redness or itching or pain " and also to watch the output in the canister. He should not have a canister filled within 1 day. If he does she needs to go to the ER. Disability paperwork filled out. ot should prevent sitting. If wound vac fails and wound edges can not be sealed remove the vac and pack wound with moist gauze and abd pad. Signs and symptoms of infection taught. Patient needs to be seen 2-3 days. Treated and follow-up appointment made.  Dr Rowan was available for supervision of care if needed or if questions should arise regarding plan of care. Patrica Johnson RN CWON

## 2018-02-12 ENCOUNTER — OFFICE VISIT (OUTPATIENT)
Dept: WOUND CARE | Facility: CLINIC | Age: 43
End: 2018-02-12
Payer: COMMERCIAL

## 2018-02-12 DIAGNOSIS — T81.30XA DISRUPTION OF WOUND OF PERINEUM IN MALE: Primary | ICD-10-CM

## 2018-02-12 LAB
FUNGUS SPEC CULT: NORMAL
SPECIMEN SOURCE: NORMAL

## 2018-02-12 NOTE — PROGRESS NOTES
"Patient comes to wound clinic for wound assessment per request of Dr. Munroe. 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 Previous treatment includes: wound vac MW  This is treatment week: 3    Objective findings: stable improving wound    Location: midline posterior scrotum and perineum     Wound measured.: 5.0 x 3 x 3.5 cm, Full thickness.    Deeper pocket in the lower posterior wall bringing total depth to  5 cm in that location. pocket itself is approximately 1.5 cm around    Tenderness:sometimes    Odor: none    Drainage is scant amt serosanguinous    Wound Base: moist and granulation     Palpation of the wound bed:  normal    Periwound Skin: intact      Color: normal and consistent with surrounding tissue     Subjective finding:   Patient is assessed for discomfort which is: none    Today's status of the wound:  improving    Treatment:  Wound vac Dressing and 1/4\" strip gauze removed, Wound cleanses with Technicare , rinsed with NS, Patient is assessed for discomfort, which is minimal, and understanding of wound vac change procedure. Wound vac removed the tunnel was cleansed with Technicare, Irrigated with saline and repacked with HydroferaBlue dressing  .  Wound Vac placement: Wound is repacked with 1piece of black foam and covered with adhesive film. 2 cm hole hole is cut in the adhesive film on top of which a piece of foam was placed to pad betweem trac pad and skin, trac pad placed on top of foam pad. Amount of sponges used are noted to be 2 . Vacuum pump is turned on  continuous suction at 125 mmHg and seal is confirmed. Instructions and warnings reiterated.       PLAN: Dressing changes 3 times/week in clinic. No changes to treatment at this time       Pt received the following instructions: Patient was instructed to assess the wound twice a day for increased redness " or itching or pain and also to watch the output in the canister. He should not have a canister filled within 1 day. If he does she needs to go to the ER. Disability paperwork filled out. ot should prevent sitting. If wound vac fails and wound edges can not be sealed remove the vac and pack wound with moist gauze and abd pad. Signs and symptoms of infection taught. Patient needs to be seen 2-3 days. Treated and follow-up appointment made.  Dr Rowan was available for supervision of care if needed or if questions should arise regarding plan of care. Patrica Johnson RN CWON

## 2018-02-12 NOTE — TELEPHONE ENCOUNTER
DR. VARMA wants to appeal this decision. You can print off the letter she wrote to appeal this. Thank you,

## 2018-02-12 NOTE — MR AVS SNAPSHOT
After Visit Summary   2/12/2018    Estevan Callejas    MRN: [id number]           Patient Information     Date Of Birth          1975        Visit Information        Provider Department      2/12/2018 7:30 AM Patrica Johnson RN [facility]        Today's Diagnoses     Disruption of wound of perineum in male    -  1       Follow-ups after your visit        Your next 10 appointments already scheduled     Feb 12, 2018 11:00 AM CST   New Visit with Antwan Macias MD   [facility] ([facility]    [address] [facility] [address] [phone]            Feb 14, 2018  8:30 AM CST   RETURN WOUND NURSE VISIT with Crhistiano Calvillo RN    [facility] ([facility])    [address] [address] [phone]            Feb 16, 2018  8:30 AM CST   RETURN WOUND NURSE VISIT with Patrica Johnson RN    [facility] ([facility])    [address] [address] [phone]            Feb 19, 2018  9:30 AM CST   RETURN WOUND NURSE VISIT with Patrica Johnson RN    [facility] ([facility])    [address] [address] [phone]            Feb 21, 2018  7:30 AM CST   RETURN WOUND NURSE VISIT with Christiano Calvillo RN    [facility] ([facility])    [address] [id number] [phone]            Feb 23, 2018  8:30 AM CST   RETURN WOUND NURSE VISIT with Patrica Johnson RN    [facility] ([facility])    [address] [address] [phone]            Feb 27, 2018  4:20 PM CST   Office Visit with Lawanda Barrera MD   [facility] ([facility])    [address]  [facility]  [id number] [phone]           Bring a current list of meds and any records pertaining to this visit. For Physicals, please bring immunization records and any forms needing to be filled out. Please arrive 10 minutes early to complete paperwork.              Who to contact     Please call your clinic at [phone] to:    Ask questions about your health    Make or cancel appointments    Discuss your medicines    Learn about your test results    Speak to your doctor            Additional Information About Your Visit        [employer] Information     localbacon is an electronic gateway that provides easy, online access to your medical records. With localbacon, you can request a clinic appointment, read your test results, renew a prescription or communicate with your care team.     To sign up for localbacon visit the website at [email or web address]   You will be asked to enter the access code listed below, as well as some personal information. Please follow the directions to create your username and password.     Your access code is: [email or web address]  Expires: 2018 11:42 AM     Your access code will  in 90 days. If you need help or a new code, please contact your [facility] or call [phone] for assistance.        Care EveryWhere ID     This is your Care EveryWhere ID. This could be used by other organizations to access your [facility] medical records  [id number]         Blood Pressure from Last 3 Encounters:   18 124/83   18 134/86   18 137/79    Weight from Last 3 Encounters:   18 111.5 kg (245 lb 14.4 oz)   18 109.8 kg (242 lb)   18 114 kg (251 lb 5.2 oz)              Today, you had the following     No orders found for display       Primary Care Provider Office Phone # Fax #    Lawanda Barrera [phone]       [address] [facility] [address]  [facility] [address]        Equal Access to Services     DAVID JORGE AH: Alanna santana  franklin Acuna, yane houstonteteha, elfego kaabelardo danilehoda, elisabeth aichain hayaan danielsarah kikemagdalena lastephaniebam paul. So [facility] [phone].    ATENCIÓN: Si habla emory, tiene a parker disposición servicios gratuitos de asistencia lingüística. Brandyn al [phone].    We comply with applicable federal civil rights laws and [place] laws. We do not discriminate on the basis of race, color, national origin, age, disability, sex, sexual orientation, or gender identity.            Thank you!     Thank you for choosing [facility] WOUND OSTOMY  for your care. Our goal is always to provide you with excellent care. Hearing back from our patients is one way we can continue to improve our services. Please take a few minutes to complete the written survey that you may receive in the mail after your visit with us. Thank you!             Your Updated Medication List - Protect others around you: Learn how to safely use, store and throw away your medicines at www.disposemymeds.org.          This list is accurate as of 2/12/18  8:14 AM.  Always use your most recent med list.                   Brand Name Dispense Instructions for use Diagnosis    acetaminophen 325 MG tablet    TYLENOL    100 tablet    Take 2 tablets (650 mg) by mouth every 4 hours as needed for other (multimodal surgical pain management along with NSAIDS and opioid medication as indicated based on pain control and physical function.)    Perirectal abscess       aspirin 81 MG tablet     90 tablet    Take 1 tablet (81 mg) by mouth daily    Type 2 diabetes mellitus without complication, without long-term current use of insulin (H)       blood glucose lancets standard    no brand specified    100 each    Use to test blood sugar one times daily or as directed.    Type 2 diabetes mellitus with complication, without long-term current use of insulin (H)       blood glucose monitoring meter device kit    no brand specified    1 kit    Use to test blood sugar one times daily or as directed.     Type 2 diabetes mellitus with complication, without long-term current use of insulin (H)       blood glucose monitoring test strip    no brand specified    90 each    Use to test blood sugars  3 times daily in am(reason- uncontrolled sugars with recent infections)    Type 2 diabetes mellitus with complication, without long-term current use of insulin (H)       gabapentin 300 MG capsule    NEURONTIN    270 capsule    Take 1 capsule (300 mg) by mouth 3 times daily    Type 2 diabetes mellitus without complication, without long-term current use of insulin (H)       glipiZIDE 2.5 MG 24 hr tablet    glipiZIDE XL    30 tablet    Take 1 tablet (2.5 mg) by mouth daily    Type 2 diabetes mellitus with other specified complication, unspecified long term insulin use status (H)       lisinopril 2.5 MG tablet    PRINIVIL/Zestril    90 tablet    Take 1 tablet (2.5 mg) by mouth daily    Type 2 diabetes mellitus without complication, without long-term current use of insulin (H)       metFORMIN 500 MG tablet    GLUCOPHAGE    120 tablet    TAKE TWO TABLETS BY MOUTH TWICE A DAY WITH MEALS    Type 2 diabetes mellitus with complication, without long-term current use of insulin (H)       oxyCODONE IR 5 MG tablet    ROXICODONE    30 tablet    Take 1-2 tablets (5-10 mg) by mouth every 3 hours as needed for breakthrough pain or moderate to severe pain    Perirectal abscess       polyethylene glycol Packet    MIRALAX/GLYCOLAX    30 packet    Take 17 g by mouth daily as needed for constipation    Drug-induced constipation

## 2018-02-13 DIAGNOSIS — K61.1 PERIRECTAL ABSCESS: ICD-10-CM

## 2018-02-13 RX ORDER — OXYCODONE HYDROCHLORIDE 5 MG/1
5-10 TABLET ORAL
Qty: 30 TABLET | Refills: 0 | Status: SHIPPED | OUTPATIENT
Start: 2018-02-13 | End: 2018-06-29

## 2018-02-14 ENCOUNTER — OFFICE VISIT (OUTPATIENT)
Dept: WOUND CARE | Facility: CLINIC | Age: 43
End: 2018-02-14
Payer: COMMERCIAL

## 2018-02-14 DIAGNOSIS — T81.30XA DISRUPTION OF WOUND OF PERINEUM IN MALE: Primary | ICD-10-CM

## 2018-02-14 NOTE — PROGRESS NOTES
"Patient comes to wound clinic for wound assessment per request of Dr. Munroe. 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 Previous treatment includes: wound vac MW  This is treatment week: 3    Objective findings: stable improving wound    Location: midline posterior scrotum and perineum     Wound measured.: 5.0 x 3 x 3.5 cm, Full thickness.    Deeper pocket in the lower posterior wall bringing total depth to  5 cm in that location. pocket itself is approximately 1.5 cm around and 5 cm deep    Tenderness: sometimes    Odor: none    Drainage is scant amt serosanguinous    Wound Base: moist and granulation     Palpation of the wound bed:  normal    Periwound Skin: intact      Color: normal and consistent with surrounding tissue     Subjective finding:   Patient is assessed for discomfort which is: none    Today's status of the wound:  improving    Treatment:  Wound vac Dressing and 1/4\" strip gauze removed, Wound cleanses with Technicare , rinsed with NS, Patient is assessed for discomfort, which is minimal, and understanding of wound vac change procedure. Wound vac removed the tunnel was cleansed with Technicare, Irrigated with saline and repacked with HydroferaBlue dressing. Wound Vac placement: Wound is repacked with 1piece of black foam and covered with adhesive film. 2 cm hole hole is cut in the adhesive film on top of which a piece of foam was placed to pad betweem trac pad and skin, trac pad placed on top of foam pad. Amount of sponges used are noted to be 2 . Vacuum pump is turned on  continuous suction at 125 mmHg and seal is confirmed. Instructions and warnings reiterated.       PLAN: Dressing changes 3 times/week in clinic. No changes to treatment at this time       Pt received the following instructions: Patient was instructed to assess the wound twice a day for " increased redness or itching or pain and also to watch the output in the canister. He should not have a canister filled within 1 day. If he does she needs to go to the ER. Disability paperwork filled out. ot should prevent sitting. If wound vac fails and wound edges can not be sealed remove the vac and pack wound with moist gauze and abd pad. Signs and symptoms of infection taught. Patient needs to be seen 2-3 days. Treated and follow-up appointment made.      Dr. Kong was available for supervision of care if needed or if questions should arise regarding plan of care.     Christiano Calvillo RN CWON

## 2018-02-14 NOTE — TELEPHONE ENCOUNTER
2/14/18 - RECEIVED FAX - [facility]    Called PT got member services number# [phone]  Faxed to new # [phone]  [address]  [place] [address]  Sent over appeal

## 2018-02-14 NOTE — MR AVS SNAPSHOT
After Visit Summary   2/14/2018    Estevan Callejas    MRN: [id number]           Patient Information     Date Of Birth          1975        Visit Information        Provider Department      2/14/2018 8:30 AM Christiano Calvillo RN  [facility]        Today's Diagnoses     Disruption of wound of perineum in male    -  1       Follow-ups after your visit        Your next 10 appointments already scheduled     Feb 16, 2018  8:30 AM CST   RETURN WOUND NURSE VISIT with Patrica Johnson RN    [facility] ([facility])    [address] [place] [id number] [phone]            Feb 19, 2018  9:30 AM CST   RETURN WOUND NURSE VISIT with TIMMY Horn [facility] Wound Ostomy ([facility])    [address] [place] [id number] [phone]            Feb 21, 2018  7:30 AM CST   RETURN WOUND NURSE VISIT with Christiano Calvillo RN   [facility] Wound Ostomy ([facility])    [address] [place] [id number] [phone]            Feb 23, 2018  8:30 AM CST   RETURN WOUND NURSE VISIT with Patrica Johnson RN   [facility] Wound Ostomy ([facility])    [address] [place] [id number] [phone]            Feb 27, 2018  4:20 PM CST   Office Visit with Lawanda Barrera MD   [facility] ([facility])    [address] [place] [address] [address]  [facility] [id number] [phone]           Bring a current list of meds and any records pertaining to this visit. For Physicals, please bring immunization records and any forms needing to be filled out. Please arrive 10 minutes early to complete paperwork.              Who to contact     Please call your clinic at [phone] to:    Ask questions about your health    Make or cancel appointments    Discuss your medicines    Learn about  your test results    Speak to your doctor            Additional Information About Your Visit        XODIShart Information     sofatutort is an electronic gateway that provides easy, online access to your medical records. With [employer], you can request a clinic appointment, read your test results, renew a prescription or communicate with your care team.     To sign up for [employer] visit the website at [email or web address]   You will be asked to enter the access code listed below, as well as some personal information. Please follow the directions to create your username and password.     Your access code is: [email or web address]  Expires: 2018 11:42 AM     Your access code will  in 90 days. If you need help or a new code, please contact your [facility] or call [phone] for assistance.        Care EveryWhere ID     This is your Care EveryWhere ID. This could be used by other organizations to access your [facility] medical records  [id number]         Blood Pressure from Last 3 Encounters:   18 124/83   18 134/86   18 137/79    Weight from Last 3 Encounters:   18 111.5 kg (245 lb 14.4 oz)   18 109.8 kg (242 lb)   18 114 kg (251 lb 5.2 oz)              Today, you had the following     No orders found for display       Primary Care Provider Office Phone # Fax #    Lawanda Barrera [phone]       [address] [facility] [address]  [facility] [address]        Equal Access to Services     [facility] Center[facility]IS ANGEL : Hadii aad ku hadasho Soomaali, waaxda luqadaha, qaybta kaalmada adeegyada, waxay jay isabel . So [facility] [phone].    ATENCIÓN: Si habla sixtoañol, tiene a parker disposición servicios gratuitos de asistencia lingüística. Llame al [phone].    We comply with applicable federal civil rights laws and [place] laws. We do not discriminate on the basis of race, color, national origin, age, disability, sex, sexual orientation, or  gender identity.            Thank you!     Thank you for choosing [facility] WOUND OSTOMY  for your care. Our goal is always to provide you with excellent care. Hearing back from our patients is one way we can continue to improve our services. Please take a few minutes to complete the written survey that you may receive in the mail after your visit with us. Thank you!             Your Updated Medication List - Protect others around you: Learn how to safely use, store and throw away your medicines at www.disposemymeds.org.          This list is accurate as of 2/14/18 10:35 AM.  Always use your most recent med list.                   Brand Name Dispense Instructions for use Diagnosis    acetaminophen 325 MG tablet    TYLENOL    100 tablet    Take 2 tablets (650 mg) by mouth every 4 hours as needed for other (multimodal surgical pain management along with NSAIDS and opioid medication as indicated based on pain control and physical function.)    Perirectal abscess       aspirin 81 MG tablet     90 tablet    Take 1 tablet (81 mg) by mouth daily    Type 2 diabetes mellitus without complication, without long-term current use of insulin (H)       blood glucose lancets standard    no brand specified    100 each    Use to test blood sugar one times daily or as directed.    Type 2 diabetes mellitus with complication, without long-term current use of insulin (H)       blood glucose monitoring meter device kit    no brand specified    1 kit    Use to test blood sugar one times daily or as directed.    Type 2 diabetes mellitus with complication, without long-term current use of insulin (H)       blood glucose monitoring test strip    no brand specified    90 each    Use to test blood sugars  3 times daily in am(reason- uncontrolled sugars with recent infections)    Type 2 diabetes mellitus with complication, without long-term current use of insulin (H)       gabapentin 300 MG capsule    NEURONTIN    270 capsule    Take 1 capsule  (300 mg) by mouth 3 times daily    Type 2 diabetes mellitus without complication, without long-term current use of insulin (H)       glipiZIDE 2.5 MG 24 hr tablet    glipiZIDE XL    30 tablet    Take 1 tablet (2.5 mg) by mouth daily    Type 2 diabetes mellitus with other specified complication, unspecified long term insulin use status (H)       lisinopril 2.5 MG tablet    PRINIVIL/Zestril    90 tablet    Take 1 tablet (2.5 mg) by mouth daily    Type 2 diabetes mellitus without complication, without long-term current use of insulin (H)       metFORMIN 500 MG tablet    GLUCOPHAGE    120 tablet    TAKE TWO TABLETS BY MOUTH TWICE A DAY WITH MEALS    Type 2 diabetes mellitus with complication, without long-term current use of insulin (H)       oxyCODONE IR 5 MG tablet    ROXICODONE    30 tablet    Take 1-2 tablets (5-10 mg) by mouth every 3 hours as needed for breakthrough pain or moderate to severe pain    Perirectal abscess       polyethylene glycol Packet    MIRALAX/GLYCOLAX    30 packet    Take 17 g by mouth daily as needed for constipation    Drug-induced constipation

## 2018-02-16 ENCOUNTER — OFFICE VISIT (OUTPATIENT)
Dept: WOUND CARE | Facility: CLINIC | Age: 43
End: 2018-02-16
Payer: COMMERCIAL

## 2018-02-16 DIAGNOSIS — T81.30XA DISRUPTION OF WOUND OF PERINEUM IN MALE: Primary | ICD-10-CM

## 2018-02-16 NOTE — MR AVS SNAPSHOT
After Visit Summary   2/16/2018    Estevan Callejas    MRN: [id number]           Patient Information     Date Of Birth          1975        Visit Information        Provider Department      2/16/2018 8:30 AM [facility] RN [facility]        Today's Diagnoses     Disruption of wound of perineum in male    -  1       Follow-ups after your visit        Your next 10 appointments already scheduled     Feb 19, 2018  9:30 AM CST   RETURN WOUND NURSE VISIT with TIMMY Horn [facility] ([facility])    [address] [place] [id number]   [phone]            Feb 21, 2018  7:30 AM CST   RETURN WOUND NURSE VISIT with Christiano Calvillo RN   [facility] Wound Ostomy ([facility])    [address] [place] [id number] [phone]            Feb 23, 2018  8:30 AM CST   RETURN WOUND NURSE VISIT with Patrica Johnson RN   [facility] Wound Ostomy ([facility])    [address] [place] [id number] [phone]            Feb 27, 2018  4:20 PM CST   Office Visit with Lawanda [facility] MD   [facility]    [address] [place] [id number] [phone]           Bring a current list of meds and any records pertaining to this visit. For Physicals, please bring immunization records and any forms needing to be filled out. Please arrive 10 minutes early to complete paperwork.              Who to contact     Please call your clinic at [phone] to:    Ask questions about your health    Make or cancel appointments    Discuss your medicines    Learn about your test results    Speak to your doctor            Additional Information About Your Visit        Tubaloohart Information     Bisc[employer] is an electronic gateway that provides easy, online access to your medical records. With  [employer], you can request a clinic appointment, read your test results, renew a prescription or communicate with your care team.     To sign up for [employer] visit the website at [email or web address]   You will be asked to enter the access code listed below, as well as some personal information. Please follow the directions to create your username and password.     Your access code is: [email or web address]  Expires: 2018 11:42 AM     Your access code will  in 90 days. If you need help or a new code, please contact your [facility] or call [phone] for assistance.        Care EveryWhere ID     This is your Care EveryWhere ID. This could be used by other organizations to access your [facility] medical records  [id number]         Blood Pressure from Last 3 Encounters:   18 124/83   18 134/86   18 137/79    Weight from Last 3 Encounters:   18 111.5 kg (245 lb 14.4 oz)   18 109.8 kg (242 lb)   18 114 kg (251 lb 5.2 oz)              Today, you had the following     No orders found for display       Primary Care Provider Office Phone # Fax #    Lawanda Barrera [phone]       [address] [place] [address]        Equal Access to Services     DAVID JORGE : Hadii jonas santana hadasho Sobeverleyali, waaxda luqadaha, qaybta kaalmada pradeep, elisabeth isabel . So [facility] [phone].    ATENCIÓN: Si habla español, tiene a parker disposición servicios gratuitos de asistencia lingüística. Brandyn al [phone].    We comply with applicable federal civil rights laws and [place] laws. We do not discriminate on the basis of race, color, national origin, age, disability, sex, sexual orientation, or gender identity.            Thank you!     Thank you for choosing [facility] WOUND OSTOMY  for your care. Our goal is always to provide you with excellent care. Hearing back from our patients is one way we can continue to  improve our services. Please take a few minutes to complete the written survey that you may receive in the mail after your visit with us. Thank you!             Your Updated Medication List - Protect others around you: Learn how to safely use, store and throw away your medicines at www.disposemymeds.org.          This list is accurate as of 2/16/18  9:34 AM.  Always use your most recent med list.                   Brand Name Dispense Instructions for use Diagnosis    acetaminophen 325 MG tablet    TYLENOL    100 tablet    Take 2 tablets (650 mg) by mouth every 4 hours as needed for other (multimodal surgical pain management along with NSAIDS and opioid medication as indicated based on pain control and physical function.)    Perirectal abscess       aspirin 81 MG tablet     90 tablet    Take 1 tablet (81 mg) by mouth daily    Type 2 diabetes mellitus without complication, without long-term current use of insulin (H)       blood glucose lancets standard    no brand specified    100 each    Use to test blood sugar one times daily or as directed.    Type 2 diabetes mellitus with complication, without long-term current use of insulin (H)       blood glucose monitoring meter device kit    no brand specified    1 kit    Use to test blood sugar one times daily or as directed.    Type 2 diabetes mellitus with complication, without long-term current use of insulin (H)       blood glucose monitoring test strip    no brand specified    90 each    Use to test blood sugars  3 times daily in am(reason- uncontrolled sugars with recent infections)    Type 2 diabetes mellitus with complication, without long-term current use of insulin (H)       gabapentin 300 MG capsule    NEURONTIN    270 capsule    Take 1 capsule (300 mg) by mouth 3 times daily    Type 2 diabetes mellitus without complication, without long-term current use of insulin (H)       glipiZIDE 2.5 MG 24 hr tablet    glipiZIDE XL    30 tablet    Take 1 tablet (2.5 mg) by  mouth daily    Type 2 diabetes mellitus with other specified complication, unspecified long term insulin use status (H)       lisinopril 2.5 MG tablet    PRINIVIL/Zestril    90 tablet    Take 1 tablet (2.5 mg) by mouth daily    Type 2 diabetes mellitus without complication, without long-term current use of insulin (H)       metFORMIN 500 MG tablet    GLUCOPHAGE    120 tablet    TAKE TWO TABLETS BY MOUTH TWICE A DAY WITH MEALS    Type 2 diabetes mellitus with complication, without long-term current use of insulin (H)       oxyCODONE IR 5 MG tablet    ROXICODONE    30 tablet    Take 1-2 tablets (5-10 mg) by mouth every 3 hours as needed for breakthrough pain or moderate to severe pain    Perirectal abscess       polyethylene glycol Packet    MIRALAX/GLYCOLAX    30 packet    Take 17 g by mouth daily as needed for constipation    Drug-induced constipation

## 2018-02-16 NOTE — PROGRESS NOTES
"exursion Patient comes to wound clinic for wound assessment per request of Dr. Munroe. 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 Previous treatment includes: wound vac MWF  This is treatment week: 3    Objective findings: stable improving wound    Location: midline posterior scrotum and perineum     Wound measured.: 5.0 x 3 x 2.5 cm, Full thickness.    Deeper pocket in the lower posterior wall bringing total depth . pocket itself is approximately 1.5 cm around and 2 cm deep    Tenderness: sometimes    Odor: none    Drainage is scant amt serosanguinous    Wound Base: moist and granulation     Palpation of the wound bed:  normal    Periwound Skin: intact      Color: normal and consistent with surrounding tissue     Subjective finding:   Patient is assessed for discomfort which is: none    Today's status of the wound:  improving    Treatment:  Wound vac Dressing and 1/4\" strip gauze removed, Wound cleanses with Technicare , rinsed with NS, Patient is assessed for discomfort, which is minimal, and understanding of wound vac change procedure. Wound vac removed the tunnel was cleansed with Technicare, Irrigated with saline and repacked with HydroferaBlue dressing. Wound Vac placement: Wound is repacked with 1piece of black foam and covered with adhesive film. 2 cm hole hole is cut in the adhesive film on top of which a piece of foam was placed to pad betweem trac pad and skin, trac pad placed on top of foam pad. Amount of sponges used are noted to be 2 . Vacuum pump is turned on  continuous suction at 125 mmHg and seal is confirmed. Instructions and warnings reiterated.       PLAN: Dressing changes 3 times/week in clinic. No changes to treatment at this time       Pt received the following instructions: Patient was instructed to assess the wound twice a day for increased redness or " itching or pain and also to watch the output in the canister. He should not have a canister filled within 1 day. If he does she needs to go to the ER. Disability paperwork filled out. ot should prevent sitting. If wound vac fails and wound edges can not be sealed remove the vac and pack wound with moist gauze and abd pad. Signs and symptoms of infection taught. Patient needs to be seen 2-3 days. Treated and follow-up appointment made.      Guadalupe Bolivar was available for supervision of care if needed or if questions should arise regarding plan of care.  Patrica Johnson RN CWON

## 2018-02-19 ENCOUNTER — OFFICE VISIT (OUTPATIENT)
Dept: WOUND CARE | Facility: CLINIC | Age: 43
End: 2018-02-19
Payer: COMMERCIAL

## 2018-02-19 DIAGNOSIS — T81.30XA DISRUPTION OF WOUND OF PERINEUM IN MALE: Primary | ICD-10-CM

## 2018-02-19 NOTE — PROGRESS NOTES
"exursion Patient comes to wound clinic for wound assessment per request of Dr. Munroe. 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 Previous treatment includes: wound vac MWF  This is treatment week: 3    Objective findings: stable improving wound but significant enlarging of the posterior pocket      Location: midline posterior scrotum and perineum     Wound measured.: 5.0 x 3 x 2.0 cm, Full thickness.    Change and big change in eeper pocket in the lower posterior wall bringing total depth . pocket itself is approximately 2 cm around and 3 cm deep.     Area of sensitivity in this area now gone so I suspect another small abscess might have opened up and  incorporated itself in the area    Tenderness: sometimes    Odor: none    Drainage is scant amt serosanguinous    Wound Base: moist and granulation     Palpation of the wound bed:  normal    Periwound Skin: intact      Color: normal and consistent with surrounding tissue     Subjective finding:   Patient is assessed for discomfort which is: none    Today's status of the wound:  improving    Treatment:  Wound vac Dressing and 1/4\" strip gauze removed, Wound cleanses with Technicare , rinsed with NS, Patient is assessed for discomfort, which is minimal, and understanding of wound vac change procedure. Wound vac removed the tunnel was cleansed with Technicare, Irrigated with saline and repacked with HydroferaBlue dressing. Wound Vac placement: Wound is repacked with 1piece of black foam and covered with adhesive film. 2 cm hole hole is cut in the adhesive film on top of which a piece of foam was placed to pad betweem trac pad and skin, trac pad placed on top of foam pad. Amount of sponges used are noted to be 2 . Vacuum pump is reduced due to caution in this rectal area. Now turned on  continuous suction at 100 mmHg and seal is " confirmed. Instructions and warnings reiterated.       PLAN: Dressing changes 3 times/week in clinic. No changes to treatment at this time       Pt received the following instructions: Patient was instructed to assess the wound twice a day for increased redness or itching or pain and also to watch the output in the canister. He should not have a canister filled within 1 day. If he does she needs to go to the ER. Disability paperwork filled out. ot should prevent sitting. If wound vac fails and wound edges can not be sealed remove the vac and pack wound with moist gauze and abd pad. Signs and symptoms of infection taught. Patient needs to be seen 2-3 days. Treated and follow-up appointment made.      Guadalupe Bolivar was available for supervision of care if needed or if questions should arise regarding plan of care.  Patrica Johnson RN CWON

## 2018-02-19 NOTE — MR AVS SNAPSHOT
After Visit Summary   2/19/2018    Estevan Callejas    MRN: [id number]           Patient Information     Date Of Birth          1975        Visit Information        Provider Department      2/19/2018 9:30 AM Patrica Johnson RN [facility]        Today's Diagnoses     Disruption of wound of perineum in male    -  1       Follow-ups after your visit        Your next 10 appointments already scheduled     Feb 21, 2018  7:30 AM CST   RETURN WOUND NURSE VISIT with TIMMY [facility] ([facility])    [address] [place] [id number] [phone]            Feb 23, 2018  8:30 AM CST   RETURN WOUND NURSE VISIT with TIMMY Horn [facility] ([facility])    [address] [place] [address] [phone]            Feb 27, 2018  4:20 PM CST   [facility]    [address] [place] [address] 100  [facility] [phone]           Bring a current list of meds and any records pertaining to this visit. For Physicals, please bring immunization records and any forms needing to be filled out. Please arrive 10 minutes early to complete paperwork.              Who to contact     Please call your clinic at [phone] to:    Ask questions about your health    Make or cancel appointments    Discuss your medicines    Learn about your test results    Speak to your doctor            Additional Information About Your Visit        MyChart Information     [employer] is an electronic gateway that provides easy, online access to your medical records. With [employer], you can request a clinic appointment, read your test results, renew a prescription or communicate with your care team.     To sign up for [employer] visit the website at [email or web address]   You will be asked to enter the  access code listed below, as well as some personal information. Please follow the directions to create your username and password.     Your access code is: [email or web address]  Expires: 2018 11:42 AM     Your access code will  in 90 days. If you need help or a new code, please contact your [facility] or call [phone] for assistance.        Care EveryWhere ID     This is your Care EveryWhere ID. This could be used by other organizations to access your [place] medical records  [id number]         Blood Pressure from Last 3 Encounters:   18 124/83   18 134/86   18 137/79    Weight from Last 3 Encounters:   18 111.5 kg (245 lb 14.4 oz)   18 109.8 kg (242 lb)   18 114 kg (251 lb 5.2 oz)              Today, you had the following     No orders found for display       Primary Care Provider Office Phone # Fax #    Lawanda Barrera [phone]       [address] [place] [address]  [facility] [address]        Equal Access to Services     [facility]: Hadii aad ku hadasho Sotrang, waaxda luqadaha, qaybta kaalmada adehoda, elisabeth isabel . So [facility] [phone].    ATENCIÓN: Si habla español, tiene a parker disposición servicios gratuitos de asistencia lingüística. [facility] [phone].    We comply with applicable federal civil rights laws and [place] laws. We do not discriminate on the basis of race, color, national origin, age, disability, sex, sexual orientation, or gender identity.            Thank you!     Thank you for choosing [facility] WOUND OSTOMY  for your care. Our goal is always to provide you with excellent care. Hearing back from our patients is one way we can continue to improve our services. Please take a few minutes to complete the written survey that you may receive in the mail after your visit with us. Thank you!             Your Updated Medication List - Protect others around you: Learn how to safely  use, store and throw away your medicines at www.disposemymeds.org.          This list is accurate as of 2/19/18 10:11 AM.  Always use your most recent med list.                   Brand Name Dispense Instructions for use Diagnosis    acetaminophen 325 MG tablet    TYLENOL    100 tablet    Take 2 tablets (650 mg) by mouth every 4 hours as needed for other (multimodal surgical pain management along with NSAIDS and opioid medication as indicated based on pain control and physical function.)    Perirectal abscess       aspirin 81 MG tablet     90 tablet    Take 1 tablet (81 mg) by mouth daily    Type 2 diabetes mellitus without complication, without long-term current use of insulin (H)       blood glucose lancets standard    no brand specified    100 each    Use to test blood sugar one times daily or as directed.    Type 2 diabetes mellitus with complication, without long-term current use of insulin (H)       blood glucose monitoring meter device kit    no brand specified    1 kit    Use to test blood sugar one times daily or as directed.    Type 2 diabetes mellitus with complication, without long-term current use of insulin (H)       blood glucose monitoring test strip    no brand specified    90 each    Use to test blood sugars  3 times daily in am(reason- uncontrolled sugars with recent infections)    Type 2 diabetes mellitus with complication, without long-term current use of insulin (H)       gabapentin 300 MG capsule    NEURONTIN    270 capsule    Take 1 capsule (300 mg) by mouth 3 times daily    Type 2 diabetes mellitus without complication, without long-term current use of insulin (H)       glipiZIDE 2.5 MG 24 hr tablet    glipiZIDE XL    30 tablet    Take 1 tablet (2.5 mg) by mouth daily    Type 2 diabetes mellitus with other specified complication, unspecified long term insulin use status (H)       lisinopril 2.5 MG tablet    PRINIVIL/Zestril    90 tablet    Take 1 tablet (2.5 mg) by mouth daily    Type 2  diabetes mellitus without complication, without long-term current use of insulin (H)       metFORMIN 500 MG tablet    GLUCOPHAGE    120 tablet    TAKE TWO TABLETS BY MOUTH TWICE A DAY WITH MEALS    Type 2 diabetes mellitus with complication, without long-term current use of insulin (H)       oxyCODONE IR 5 MG tablet    ROXICODONE    30 tablet    Take 1-2 tablets (5-10 mg) by mouth every 3 hours as needed for breakthrough pain or moderate to severe pain    Perirectal abscess       polyethylene glycol Packet    MIRALAX/GLYCOLAX    30 packet    Take 17 g by mouth daily as needed for constipation    Drug-induced constipation

## 2018-02-21 ENCOUNTER — OFFICE VISIT (OUTPATIENT)
Dept: WOUND CARE | Facility: CLINIC | Age: 43
End: 2018-02-21
Payer: COMMERCIAL

## 2018-02-21 DIAGNOSIS — T81.30XA DISRUPTION OF WOUND OF PERINEUM IN MALE: Primary | ICD-10-CM

## 2018-02-21 NOTE — PROGRESS NOTES
Patient comes to wound clinic for wound assessment per request of Dr. Munroe. 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 Previous treatment includes: wound vac MW  This is treatment week: 3    Objective findings: stable improving wound but significant enlarging of the posterior pocket      Location: midline posterior scrotum and perineum     Wound measured.: 5.0 x 3 x 2.0 cm, Full thickness.    Change and big change in eeper pocket in the lower posterior wall 2 cm around and 3 cm deep.     Patient states that area of sensitivity in this area now gone    Tenderness: sometimes    Odor: none    Drainage is small amt serosanguinous    Wound Base: moist and granulation     Palpation of the wound bed: normal    Periwound Skin: intact      Color: normal and consistent with surrounding tissue     Subjective finding:   Patient is assessed for discomfort which is: none    Today's status of the wound:  improving    Treatment:  Wound vac Dressing removed, Wound cleanses with [employer] , rinsed with NS, Patient is assessed for discomfort, which is minimal, and understanding of wound vac change procedure. Wound vac removed the tunnel was cleansed with [employer]icare, Irrigated with saline and repacked with black foam. Wound Vac placement: Wound is repacked with 1piece of black foam and covered with adhesive film. 2 cm hole hole is cut in the adhesive film on top of which a piece of foam was placed to pad betweem trac pad and skin, trac pad placed on top of foam pad. Amount of sponges used are noted to be 3 . Wound vac turned on  continuous suction at 100 mmHg and seal is confirmed. Instructions and warnings reiterated.       PLAN: Dressing changes 3 times/week in clinic. No changes to treatment at this time       Pt received the following instructions: Patient was instructed to assess the  wound twice a day for increased redness or itching or pain and also to watch the output in the canister. If wound vac fails and wound edges can not be sealed remove the vac and pack wound with moist gauze and abd pad. Signs and symptoms of infection taught. Patient needs to be seen 2-3 days. Treated and follow-up appointment made.      Dr. Guevara was available for supervision of care if needed or if questions should arise regarding plan of care.      Christiano Calvillo RN CWON

## 2018-02-21 NOTE — MR AVS SNAPSHOT
After Visit Summary   2018    Estevan Callejas    MRN: [id number]           Patient Information     Date Of Birth          1975        Visit Information        Provider Department      2018 7:30 AM Christiano Calvillo RN [facility]        Today's Diagnoses     Disruption of wound of perineum in male    -  1       Follow-ups after your visit        Your next 10 appointments already scheduled     2018  8:30 AM CST   RETURN WOUND NURSE VISIT with TIMMY [facility] ([facility])    [address] [facility]  [address] [facility] [id number] [phone]            2018  4:20 PM CST   [facility] with [facility]    [address] [phone]           Bring a current list of meds and any records pertaining to this visit. For Physicals, please bring immunization records and any forms needing to be filled out. Please arrive 10 minutes early to complete paperwork.              Who to contact     Please call your clinic at [phone] to:    Ask questions about your health    Make or cancel appointments    Discuss your medicines    Learn about your test results    Speak to your doctor            Additional Information About Your Visit        MyChart Information     [employer] is an electronic gateway that provides easy, online access to your medical records. With [employer], you can request a clinic appointment, read your test results, renew a prescription or communicate with your care team.     To sign up for [facility] visit the website at [email or web address]   You will be asked to enter the access code listed below, as well as some personal information. Please follow the directions to create your username and password.     Your access code is: [email or web address]  Expires: 2018 11:42 AM     Your access code will   in 90 days. If you need help or a new code, please contact your [facility] or call [phone] for assistance.        Care EveryWhere ID     This is your Care EveryWhere ID. This could be used by other organizations to access your [place] medical records  [id number]         Blood Pressure from Last 3 Encounters:   01/31/18 124/83   01/30/18 134/86   01/18/18 137/79    Weight from Last 3 Encounters:   01/31/18 111.5 kg (245 lb 14.4 oz)   01/30/18 109.8 kg (242 lb)   01/14/18 114 kg (251 lb 5.2 oz)              Today, you had the following     No orders found for display       Primary Care Provider Office Phone # Fax #    Lawanda Barrera [phone]       [address] [facility] 290  [employer] [address]        Equal Access to Services     [facility]: Hadii jonas santana hadasho Sotrang, waaxda luqadaha, qaybta kaalmada adesarahyada, elisabeth isabel . So [facility] [phone].    ATENCIÓN: Si habla español, tiene a parker disposición servicios gratuitos de asistencia lingüística. Brandyn al [phone].    We comply with applicable federal civil rights laws and [place] laws. We do not discriminate on the basis of race, color, national origin, age, disability, sex, sexual orientation, or gender identity.            Thank you!     Thank you for choosing [facility] WOUND OSTOMY  for your care. Our goal is always to provide you with excellent care. Hearing back from our patients is one way we can continue to improve our services. Please take a few minutes to complete the written survey that you may receive in the mail after your visit with us. Thank you!             Your Updated Medication List - Protect others around you: Learn how to safely use, store and throw away your medicines at www.disposemymeds.org.          This list is accurate as of 2/21/18  9:45 AM.  Always use your most recent med list.                   Brand Name Dispense Instructions for use  Diagnosis    acetaminophen 325 MG tablet    TYLENOL    100 tablet    Take 2 tablets (650 mg) by mouth every 4 hours as needed for other (multimodal surgical pain management along with NSAIDS and opioid medication as indicated based on pain control and physical function.)    Perirectal abscess       aspirin 81 MG tablet     90 tablet    Take 1 tablet (81 mg) by mouth daily    Type 2 diabetes mellitus without complication, without long-term current use of insulin (H)       blood glucose lancets standard    no brand specified    100 each    Use to test blood sugar one times daily or as directed.    Type 2 diabetes mellitus with complication, without long-term current use of insulin (H)       blood glucose monitoring meter device kit    no brand specified    1 kit    Use to test blood sugar one times daily or as directed.    Type 2 diabetes mellitus with complication, without long-term current use of insulin (H)       blood glucose monitoring test strip    no brand specified    90 each    Use to test blood sugars  3 times daily in am(reason- uncontrolled sugars with recent infections)    Type 2 diabetes mellitus with complication, without long-term current use of insulin (H)       gabapentin 300 MG capsule    NEURONTIN    270 capsule    Take 1 capsule (300 mg) by mouth 3 times daily    Type 2 diabetes mellitus without complication, without long-term current use of insulin (H)       glipiZIDE 2.5 MG 24 hr tablet    glipiZIDE XL    30 tablet    Take 1 tablet (2.5 mg) by mouth daily    Type 2 diabetes mellitus with other specified complication, unspecified long term insulin use status (H)       lisinopril 2.5 MG tablet    PRINIVIL/Zestril    90 tablet    Take 1 tablet (2.5 mg) by mouth daily    Type 2 diabetes mellitus without complication, without long-term current use of insulin (H)       metFORMIN 500 MG tablet    GLUCOPHAGE    120 tablet    TAKE TWO TABLETS BY MOUTH TWICE A DAY WITH MEALS    Type 2 diabetes mellitus  with complication, without long-term current use of insulin (H)       oxyCODONE IR 5 MG tablet    ROXICODONE    30 tablet    Take 1-2 tablets (5-10 mg) by mouth every 3 hours as needed for breakthrough pain or moderate to severe pain    Perirectal abscess       polyethylene glycol Packet    MIRALAX/GLYCOLAX    30 packet    Take 17 g by mouth daily as needed for constipation    Drug-induced constipation

## 2018-02-23 ENCOUNTER — OFFICE VISIT (OUTPATIENT)
Dept: WOUND CARE | Facility: CLINIC | Age: 43
End: 2018-02-23
Payer: COMMERCIAL

## 2018-02-23 DIAGNOSIS — T81.30XA DISRUPTION OF WOUND OF PERINEUM IN MALE: Primary | ICD-10-CM

## 2018-02-23 DIAGNOSIS — E11.8 TYPE 2 DIABETES MELLITUS WITH COMPLICATION, WITHOUT LONG-TERM CURRENT USE OF INSULIN (H): ICD-10-CM

## 2018-02-23 DIAGNOSIS — E11.69 TYPE 2 DIABETES MELLITUS WITH OTHER SPECIFIED COMPLICATION, UNSPECIFIED LONG TERM INSULIN USE STATUS: ICD-10-CM

## 2018-02-23 RX ORDER — GLIPIZIDE 2.5 MG/1
TABLET, EXTENDED RELEASE ORAL
Qty: 30 TABLET | Refills: 0 | Status: SHIPPED | OUTPATIENT
Start: 2018-02-23 | End: 2018-03-06

## 2018-02-23 NOTE — TELEPHONE ENCOUNTER
metFORMIN (GLUCOPHAGE) 500 MG tablet  Medication is being filled for 1 time refill only due to:  Patient needs to be seen because due for 1 month follow up - scheduled.   Next 5 appointments (look out 90 days)     Feb 27, 2018  4:20 PM CST   Office Visit with Lawanda Barrera MD   [facility]    [address] [address]   [phone]

## 2018-02-23 NOTE — TELEPHONE ENCOUNTER
glipiZIDE (GLIPIZIDE XL) 2.5 MG 24 hr tablet  Medication is being filled for 1 time refill only due to:  Patient needs to be seen because due for 1 month follow up - scheduled.   Next 5 appointments (look out 90 days)     Feb 27, 2018  4:20 PM CST   Office Visit with Lawanda Barrera MD   [facility] ([facility])    [address] [place] [address]   [phone]                Dannielle Harris, TIMMY, BSN

## 2018-02-23 NOTE — MR AVS SNAPSHOT
After Visit Summary   2/23/2018    Estevan Callejas    MRN: [id number]           Patient Information     Date Of Birth          1975        Visit Information        Provider Department      2/23/2018 8:30 AM Pat[facility] RN [facility]        Today's Diagnoses     Disruption of wound of perineum in male    -  1       Follow-ups after your visit        Your next 10 appointments already scheduled     Feb 26, 2018  8:00 AM CST   RETURN WOUND NURSE VISIT with TIMMY [facility] ([facility])    [address] [facility]  [address] [facility] [id number] [phone]            Feb 27, 2018  4:20 PM CST   Office Visit with Lawanda Barrera MD   [facility]    [address] [phone]           Bring a current list of meds and any records pertaining to this visit. For Physicals, please bring immunization records and any forms needing to be filled out. Please arrive 10 minutes early to complete paperwork.              Who to contact     Please call your clinic at [phone] to:    Ask questions about your health    Make or cancel appointments    Discuss your medicines    Learn about your test results    Speak to your doctor            Additional Information About Your Visit        MyChart Information     [employer] is an electronic gateway that provides easy, online access to your medical records. With [employer], you can request a clinic appointment, read your test results, renew a prescription or communicate with your care team.     To sign up for [employer] visit the website at [email or web address]   You will be asked to enter the access code listed below, as well as some personal information. Please follow the directions to create your username and password.     Your access code is: [email or web address]  Expires: 4/4/2018 11:42 AM     Your access code will   in 90 days. If you need help or a new code, please contact your [facility] or call [phone] for assistance.        Care EveryWhere ID     This is your Care EveryWhere ID. This could be used by other organizations to access your [place] medical records  [id number]         Blood Pressure from Last 3 Encounters:   18 124/83   18 134/86   18 137/79    Weight from Last 3 Encounters:   18 111.5 kg (245 lb 14.4 oz)   18 109.8 kg (242 lb)   18 114 kg (251 lb 5.2 oz)              Today, you had the following     No orders found for display       Primary Care Provider Office Phone # Fax #    Lawanda Barrera [phone]       [address] [facility] 290  [facility] [address]        Equal Access to Services     [facility]: Hadii jonas freedmano Sotrang, waaxda luqadaha, qaybta kaalmada adesarahyada, elisabeth isabel . So [facility] [phone].    ATENCIÓN: Si habla español, tiene a parker disposición servicios gratuitos de asistencia lingüística. Brandyn al [phone].    We comply with applicable federal civil rights laws and [place] laws. We do not discriminate on the basis of race, color, national origin, age, disability, sex, sexual orientation, or gender identity.            Thank you!     Thank you for choosing [facility] WOUND OSTOMY  for your care. Our goal is always to provide you with excellent care. Hearing back from our patients is one way we can continue to improve our services. Please take a few minutes to complete the written survey that you may receive in the mail after your visit with us. Thank you!             Your Updated Medication List - Protect others around you: Learn how to safely use, store and throw away your medicines at www.disposemymeds.org.          This list is accurate as of 18  9:35 AM.  Always use your most recent med list.                   Brand Name Dispense Instructions for use  Diagnosis    acetaminophen 325 MG tablet    TYLENOL    100 tablet    Take 2 tablets (650 mg) by mouth every 4 hours as needed for other (multimodal surgical pain management along with NSAIDS and opioid medication as indicated based on pain control and physical function.)    Perirectal abscess       aspirin 81 MG tablet     90 tablet    Take 1 tablet (81 mg) by mouth daily    Type 2 diabetes mellitus without complication, without long-term current use of insulin (H)       blood glucose lancets standard    no brand specified    100 each    Use to test blood sugar one times daily or as directed.    Type 2 diabetes mellitus with complication, without long-term current use of insulin (H)       blood glucose monitoring meter device kit    no brand specified    1 kit    Use to test blood sugar one times daily or as directed.    Type 2 diabetes mellitus with complication, without long-term current use of insulin (H)       blood glucose monitoring test strip    no brand specified    90 each    Use to test blood sugars  3 times daily in am(reason- uncontrolled sugars with recent infections)    Type 2 diabetes mellitus with complication, without long-term current use of insulin (H)       gabapentin 300 MG capsule    NEURONTIN    270 capsule    Take 1 capsule (300 mg) by mouth 3 times daily    Type 2 diabetes mellitus without complication, without long-term current use of insulin (H)       glipiZIDE 2.5 MG 24 hr tablet    glipiZIDE XL    30 tablet    Take 1 tablet (2.5 mg) by mouth daily    Type 2 diabetes mellitus with other specified complication, unspecified long term insulin use status (H)       lisinopril 2.5 MG tablet    PRINIVIL/Zestril    90 tablet    Take 1 tablet (2.5 mg) by mouth daily    Type 2 diabetes mellitus without complication, without long-term current use of insulin (H)       metFORMIN 500 MG tablet    GLUCOPHAGE    120 tablet    TAKE TWO TABLETS BY MOUTH TWICE A DAY WITH MEALS    Type 2 diabetes mellitus  with complication, without long-term current use of insulin (H)       oxyCODONE IR 5 MG tablet    ROXICODONE    30 tablet    Take 1-2 tablets (5-10 mg) by mouth every 3 hours as needed for breakthrough pain or moderate to severe pain    Perirectal abscess       polyethylene glycol Packet    MIRALAX/GLYCOLAX    30 packet    Take 17 g by mouth daily as needed for constipation    Drug-induced constipation

## 2018-02-23 NOTE — PROGRESS NOTES
Patient comes to wound clinic for wound assessment per request of Dr. Munroe. 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 Previous treatment includes: wound vac MW  This is treatment week: 3    Objective findings: stable improving wound but significant enlarging of the posterior pocket      Location: midline posterior scrotum and perineum     Wound measured.: 5.0 x 3 x 2.0 cm, Full thickness.    Change and big change in eeper pocket in the lower posterior wall 1 cm around and 2 cm deep.     Patient states that area of sensitivity in this area now gone    Tenderness: sometimes    Odor: none    Drainage is small amt serosanguinous    Wound Base: moist and granulation     Palpation of the wound bed: normal    Periwound Skin: intact      Color: normal and consistent with surrounding tissue     Subjective finding:   Patient is assessed for discomfort which is: none    Today's status of the wound:  improving    Treatment:  Wound vac Dressing removed, Wound cleanses with Technicare , rinsed with NS, Patient is assessed for discomfort, which is minimal, and understanding of wound vac change procedure. Wound vac removed the tunnel was cleansed with Technicare, Irrigated with saline and repacked with black foam. Wound Vac placement: Wound is repacked with 1piece of black foam and covered with adhesive film. 2 cm hole hole is cut in the adhesive film on top of which a piece of foam was placed to pad betweem trac pad and skin, trac pad placed on top of foam pad. Amount of sponges used are noted to be 2 . Wound vac turned on  continuous suction at 100 mmHg and seal is confirmed. Instructions and warnings reiterated.       PLAN: Dressing changes 3 times/week in clinic. No changes to treatment at this time       Pt received the following instructions: Patient was instructed to assess the  wound twice a day for increased redness or itching or pain and also to watch the output in the canister. If wound vac fails and wound edges can not be sealed remove the vac and pack wound with moist gauze and abd pad. Signs and symptoms of infection taught. Patient needs to be seen 2-3 days. Treated and follow-up appointment made.        was available for supervision of care if needed or if questions should arise regarding plan of care.  Patrica Johnson RN CWON

## 2018-02-26 ENCOUNTER — OFFICE VISIT (OUTPATIENT)
Dept: WOUND CARE | Facility: CLINIC | Age: 43
End: 2018-02-26
Payer: COMMERCIAL

## 2018-02-26 DIAGNOSIS — T81.30XA DISRUPTION OF WOUND OF PERINEUM IN MALE: Primary | ICD-10-CM

## 2018-02-26 NOTE — PROGRESS NOTES
"5 x1.5 cm x1 cm with 2 cm pocket   Patient comes to wound clinic for wound assessment per request of Dr. Munroe. 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 Previous treatment includes: wound vac MW  This is treatment week: 3    Objective findings: stable improving wound ,      Location: midline posterior scrotum and perineum     Wound measured.: 5.0 x 1.5 x 1.0 cm, Full thickness.    Change and big change in deeper pocket in the lower posterior wall <1 cm around and 2 cm deep.     Patient states that area of sensitivity in this area now gone    Tenderness: sometimes    Odor: none    Drainage is small amt serosanguinous    Wound Base: moist and granulation     Palpation of the wound bed: normal    Periwound Skin: intact      Color: normal and consistent with surrounding tissue     Subjective finding:   Patient is assessed for discomfort which is: none    Today's status of the wound:  improving    Treatment:  Wound vac discontinued for now. Wound washed with Technicare, irrigated with saline, pocket packet with 1/4\" strip gauze, moist gauze on the remainder of the wound, covered with 2 dry gauzes and tape. Pt will do BID dressing changes,      PLAN: Dressing changes 3 times/week in clinic. No changes to treatment at this time       Pt received the following instructions: Wound washed with Technicare, irrigated with saline, pocket packet with 1/4\" strip gauze, moist gauze on the remainder of the wound, covered with 2 dry gauzes and tape. Pt will do BID dressing changes,Signs and symptoms of infection taught. Patient needs to be seen 5 days. Treated and follow-up appointment made.  Dr. Rowan was available for supervision of care if needed or if questions should arise regarding plan of care.  Patrica Johnson  RN CWON  "

## 2018-02-26 NOTE — MR AVS SNAPSHOT
After Visit Summary   2/26/2018    Estevan Callejas    MRN: [id number]           Patient Information     Date Of Birth          1975        Visit Information        Provider Department      2/26/2018 8:00 AM Patrica Johnson RN [facility]        Today's Diagnoses     Disruption of wound of perineum in male    -  1       Follow-ups after your visit        Your next 10 appointments already scheduled     Feb 27, 2018  4:20 PM CST   [facility]    [address]  [facility] [id number] [phone]           Bring a current list of meds and any records pertaining to this visit. For Physicals, please bring immunization records and any forms needing to be filled out. Please arrive 10 minutes early to complete paperwork.            Mar 02, 2018  7:30 AM CST   RETURN WOUND NURSE VISIT with [facility] ([facility])    [address]  [address] [facility] [address] [phone]            Mar 05, 2018 10:30 AM CST   New Visit with Antwan Macias MD   [facility]    [address] [facility] [address] [phone]              Who to contact     Please call your clinic at [phone] to:    Ask questions about your health    Make or cancel appointments    Discuss your medicines    Learn about your test results    Speak to your doctor            Additional Information About Your Visit        MyChart Information     [facility] is an electronic gateway that provides easy, online access to your medical records. With [facility], you can request a clinic appointment, read your test results, renew a prescription or communicate with your care team.     To sign up for [employer] visit the website at [email or web address]   You will be asked to enter the access code listed below, as  well as some personal information. Please follow the directions to create your username and password.     Your access code is: [email or web address]  Expires: 2018 11:42 AM     Your access code will  in 90 days. If you need help or a new code, please contact your [facility] or call [phone] for assistance.        Care EveryWhere ID     This is your Care EveryWhere ID. This could be used by other organizations to access your [facility] medical records  [id number]         Blood Pressure from Last 3 Encounters:   18 124/83   18 134/86   18 137/79    Weight from Last 3 Encounters:   18 111.5 kg (245 lb 14.4 oz)   18 109.8 kg (242 lb)   18 114 kg (251 lb 5.2 oz)              Today, you had the following     No orders found for display       Primary Care Provider Office Phone # Fax #    Lawanda Barrera [phone]       [address]  [facility] [place] [facility] [address]        Equal Access to Services     [facility]EL : Hadii jonas ku hadrayshawno Sotrang, waaxda lukyrieadaha, qaybta kaalmada pradeep, elisabeth isabel . [facility] [phone].    ATENCIÓN: Si habla español, tiene a parker disposición servicios gratuitos de asistencia lingüística. [facility] [phone].    We comply with applicable federal civil rights laws and [place] laws. We do not discriminate on the basis of race, color, national origin, age, disability, sex, sexual orientation, or gender identity.            Thank you!     Thank you for choosing [facility]  for your care. Our goal is always to provide you with excellent care. Hearing back from our patients is one way we can continue to improve our services. Please take a few minutes to complete the written survey that you may receive in the mail after your visit with us. Thank you!             Your Updated Medication List - Protect others around you: Learn how to safely use, store and throw away  your medicines at www.disposemymeds.org.          This list is accurate as of 2/26/18  9:11 AM.  Always use your most recent med list.                   Brand Name Dispense Instructions for use Diagnosis    acetaminophen 325 MG tablet    TYLENOL    100 tablet    Take 2 tablets (650 mg) by mouth every 4 hours as needed for other (multimodal surgical pain management along with NSAIDS and opioid medication as indicated based on pain control and physical function.)    Perirectal abscess       aspirin 81 MG tablet     90 tablet    Take 1 tablet (81 mg) by mouth daily    Type 2 diabetes mellitus without complication, without long-term current use of insulin (H)       blood glucose lancets standard    no brand specified    100 each    Use to test blood sugar one times daily or as directed.    Type 2 diabetes mellitus with complication, without long-term current use of insulin (H)       blood glucose monitoring meter device kit    no brand specified    1 kit    Use to test blood sugar one times daily or as directed.    Type 2 diabetes mellitus with complication, without long-term current use of insulin (H)       blood glucose monitoring test strip    no brand specified    90 each    Use to test blood sugars  3 times daily in am(reason- uncontrolled sugars with recent infections)    Type 2 diabetes mellitus with complication, without long-term current use of insulin (H)       gabapentin 300 MG capsule    NEURONTIN    270 capsule    Take 1 capsule (300 mg) by mouth 3 times daily    Type 2 diabetes mellitus without complication, without long-term current use of insulin (H)       glipiZIDE 2.5 MG 24 hr tablet    GLUCOTROL XL    30 tablet    TAKE ONE TABLET BY MOUTH once daily    Type 2 diabetes mellitus with other specified complication, unspecified long term insulin use status (H)       lisinopril 2.5 MG tablet    PRINIVIL/Zestril    90 tablet    Take 1 tablet (2.5 mg) by mouth daily    Type 2 diabetes mellitus without  complication, without long-term current use of insulin (H)       metFORMIN 500 MG tablet    GLUCOPHAGE    120 tablet    TAKE TWO TABLETS BY MOUTH TWICE DAILY WITH meals    Type 2 diabetes mellitus with complication, without long-term current use of insulin (H)       oxyCODONE IR 5 MG tablet    ROXICODONE    30 tablet    Take 1-2 tablets (5-10 mg) by mouth every 3 hours as needed for breakthrough pain or moderate to severe pain    Perirectal abscess       polyethylene glycol Packet    MIRALAX/GLYCOLAX    30 packet    Take 17 g by mouth daily as needed for constipation    Drug-induced constipation

## 2018-02-27 NOTE — PROGRESS NOTES
SUBJECTIVE:   Estevan Callejas is a 42 year old male who presents to clinic today for the following health issues:      History of Present Illness     Diabetes:     Frequency of checking blood sugars::  3 times a day    Diabetic concerns::  None and Blood sugar frequently over 200    Hypoglycemia symptoms::  None    Paraesthesia present::  No    Eye Exam in the last year::  Yes (2/2018)    na        BP Readings from Last 2 Encounters:   03/01/18 126/84   01/31/18 124/83     Hemoglobin A1C (%)   Date Value   01/30/2018 6.7 (H)   06/12/2017 5.8     LDL Cholesterol Calculated (mg/dL)   Date Value   01/30/2018     Cannot estimate LDL when triglyceride exceeds 400 mg/dL   12/16/2016 63     LDL Cholesterol Direct (mg/dL)   Date Value   01/30/2018 119 (H)     Problem list and histories reviewed & adjusted, as indicated.  Additional history: as documented        Patient Active Problem List   Diagnosis     Peripheral neuropathy - near S2 dermatome     Type 2 diabetes, HbA1c goal < 7% (H)     Low back pain     Morbid obesity (H)     Perineal abscess     Wound abscess     Epidermal cyst of face     Past Surgical History:   Procedure Laterality Date     EXAM UNDER ANESTHESIA, CHANGE DRESSING (LOCATION), COMBINED N/A 1/15/2018    Procedure: COMBINED EXAM UNDER ANESTHESIA, CHANGE DRESSING (LOCATION);  Dressing Change and exam under Monitored Anesthesia Care with wound vac placement;  Surgeon: Stefania Munroe MD;  Location: [facility] OR     EXAM UNDER ANESTHESIA, CHANGE DRESSING (LOCATION), COMBINED N/A 1/18/2018    Procedure: COMBINED EXAM UNDER ANESTHESIA, CHANGE DRESSING (LOCATION);  Serial wound vac change perineal;  Surgeon: Stefania Munroe MD;  Location: [facility] OR     IRRIGATION AND DEBRIDEMENT PERINEAL, COMBINED N/A 1/13/2018    Procedure: COMBINED IRRIGATION AND DEBRIDEMENT PERINEAL;  Irrigation and Debridement of perianal abscess;  Surgeon: Stefania Munroe MD;  Location: [facility] OR     IRRIGATION  AND DEBRIDEMENT PERINEAL, COMBINED N/A 1/14/2018    Procedure: COMBINED IRRIGATION AND DEBRIDEMENT PERINEAL;  combined irrigation and debridement perineal and dressing change;  Surgeon: Stefania Munroe MD;  Location: [facility] OR     ORTHOPEDIC SURGERY       TONSILLECTOMY, ADENOIDECTOMY, COMBINED         Social History   Substance Use Topics     Smoking status: Current Some Day Smoker     Types: Dip, chew, snus or snuff, Cigarettes     Smokeless tobacco: Current User     Alcohol use Yes     History reviewed. No pertinent family history.      Current Outpatient Prescriptions   Medication Sig Dispense Refill     lisinopril (PRINIVIL/ZESTRIL) 5 MG tablet Take 1 tablet (5 mg) by mouth daily 90 tablet 0     metFORMIN (GLUCOPHAGE) 500 MG tablet TAKE TWO TABLETS BY MOUTH TWICE DAILY WITH meals 120 tablet 0     glipiZIDE (GLUCOTROL XL) 2.5 MG 24 hr tablet TAKE ONE TABLET BY MOUTH once daily 30 tablet 0     oxyCODONE IR (ROXICODONE) 5 MG tablet Take 1-2 tablets (5-10 mg) by mouth every 3 hours as needed for breakthrough pain or moderate to severe pain 30 tablet 0     blood glucose monitoring (NO BRAND SPECIFIED) test strip Use to test blood sugars  3 times daily in am(reason- uncontrolled sugars with recent infections) 90 each 3     blood glucose (NO BRAND SPECIFIED) lancets standard Use to test blood sugar one times daily or as directed. 100 each 3     acetaminophen (TYLENOL) 325 MG tablet Take 2 tablets (650 mg) by mouth every 4 hours as needed for other (multimodal surgical pain management along with NSAIDS and opioid medication as indicated based on pain control and physical function.) 100 tablet      polyethylene glycol (MIRALAX/GLYCOLAX) Packet Take 17 g by mouth daily as needed for constipation 30 packet 1     gabapentin (NEURONTIN) 300 MG capsule Take 1 capsule (300 mg) by mouth 3 times daily 270 capsule 1     aspirin 81 MG tablet Take 1 tablet (81 mg) by mouth daily 90 tablet 3     blood glucose monitoring  "(NO BRAND SPECIFIED) meter device kit Use to test blood sugar one times daily or as directed. 1 kit 0     No Known Allergies  Recent Labs   Lab Test  01/30/18   1456  01/18/18   1111  01/15/18   0737   01/13/18   0126  06/12/17   1637  03/09/17   1753  12/16/16   1436  11/07/16   1651  03/13/15   0822   02/18/15   1430   A1C  6.7*   --    --    --    --   5.8  6.3*   --   10.3*   --    < >   --    LDL  Cannot estimate LDL when triglyceride exceeds 400 mg/dL  119*   --    --    --    --    --    --   63   --   66   < >   --    HDL  67   --    --    --    --    --    --   65   --   64   --    --    TRIG  505*   --    --    --    --    --    --   104   --   117   --    --    ALT   --    --    --    --   107*   --    --    --   57   --    --   58   CR  0.67   --   0.55*   < >  0.71   --    --    --   0.80   --    --   0.59*   GFRESTIMATED  >90   --   >90   < >  >90   --    --    --   >90  Non  GFR Calc     --    --   >90  Non  GFR Calc     GFRESTBLACK  >90   --   >90   < >  >90   --    --    --   >90   GFR Calc     --    --   >90   GFR Calc     POTASSIUM  4.1  3.6  3.5   < >  3.7   --    --    --   4.3   --    --   3.8   TSH   --    --    --    --    --    --   3.28   --    --    --    --   1.13    < > = values in this interval not displayed.      BP Readings from Last 3 Encounters:   03/01/18 126/84   01/31/18 124/83   01/30/18 134/86    Wt Readings from Last 3 Encounters:   03/01/18 255 lb (115.7 kg)   01/31/18 245 lb 14.4 oz (111.5 kg)   01/30/18 242 lb (109.8 kg)                  Labs reviewed in EPIC    ROS:  Constitutional, HEENT, cardiovascular, pulmonary, GI, , musculoskeletal, neuro, skin, endocrine and psych systems are negative, except as otherwise noted.    OBJECTIVE:     /84 (BP Location: Right arm, Patient Position: Chair, Cuff Size: Adult Regular)  Pulse 75  Temp 98.2  F (36.8  C) (Oral)  Resp 18  Ht 5' 9\" (1.753 m)  Wt 255 lb " (115.7 kg)  SpO2 98%  BMI 37.66 kg/m2  Body mass index is 37.66 kg/(m^2).   Physical Exam   Constitutional: He is oriented to person, place, and time. He appears well-developed and well-nourished.   HENT:   Head: Normocephalic and atraumatic.   Right Ear: External ear normal.   Left Ear: External ear normal.   Nose: Nose normal.   Mouth/Throat: No oropharyngeal exudate.   Cardiovascular: Normal rate, regular rhythm, normal heart sounds and intact distal pulses.  Exam reveals no gallop.    No murmur heard.  Pulmonary/Chest: Effort normal and breath sounds normal.   Neurological: He is alert and oriented to person, place, and time.   Psychiatric: He has a normal mood and affect.         Diagnostic Test Results:  none     ASSESSMENT/PLAN:     Problem List Items Addressed This Visit     Type 2 diabetes, HbA1c goal < 7% (H) - Primary     Improved sugars but still has mildly elevated sugars  Increase dose of gliizide to 5mg   Recheck in 3 months         Relevant Medications    lisinopril (PRINIVIL/ZESTRIL) 5 MG tablet      Other Visit Diagnoses     Screening for diabetic peripheral neuropathy        Relevant Orders    FOOT EXAM  NO CHARGE [94571.114] (Completed)           Lawanda Barrera MD  [facility]

## 2018-03-01 ENCOUNTER — OFFICE VISIT (OUTPATIENT)
Dept: FAMILY MEDICINE | Facility: OTHER | Age: 43
End: 2018-03-01
Payer: COMMERCIAL

## 2018-03-01 VITALS
TEMPERATURE: 98.2 F | DIASTOLIC BLOOD PRESSURE: 84 MMHG | OXYGEN SATURATION: 98 % | SYSTOLIC BLOOD PRESSURE: 126 MMHG | HEART RATE: 75 BPM | BODY MASS INDEX: 37.77 KG/M2 | WEIGHT: 255 LBS | HEIGHT: 69 IN | RESPIRATION RATE: 18 BRPM

## 2018-03-01 DIAGNOSIS — Z13.89 SCREENING FOR DIABETIC PERIPHERAL NEUROPATHY: ICD-10-CM

## 2018-03-01 DIAGNOSIS — E11.9 TYPE 2 DIABETES MELLITUS WITHOUT COMPLICATION, WITHOUT LONG-TERM CURRENT USE OF INSULIN (H): Primary | ICD-10-CM

## 2018-03-01 PROCEDURE — 99207 C FOOT EXAM  NO CHARGE: CPT | Performed by: FAMILY MEDICINE

## 2018-03-01 PROCEDURE — 99213 OFFICE O/P EST LOW 20 MIN: CPT | Mod: 25 | Performed by: FAMILY MEDICINE

## 2018-03-01 RX ORDER — LISINOPRIL 5 MG/1
5 TABLET ORAL DAILY
Qty: 90 TABLET | Refills: 0 | Status: SHIPPED | OUTPATIENT
Start: 2018-03-01 | End: 2018-03-06

## 2018-03-01 ASSESSMENT — PAIN SCALES - GENERAL: PAINLEVEL: NO PAIN (0)

## 2018-03-01 NOTE — MR AVS SNAPSHOT
After Visit Summary   3/1/2018    Estevan Callejas    MRN: [id number]           Patient Information     Date Of Birth          1975        Visit Information        Provider Department      3/1/2018 1:00 PM Lawanda Barrera MD [facility]        Today's Diagnoses     Screening for diabetic retinopathy        Screening for diabetic peripheral neuropathy        Need for prophylactic vaccination and inoculation against influenza        Need for prophylactic vaccination against Streptococcus pneumoniae (pneumococcus)        Type 2 diabetes mellitus without complication, without long-term current use of insulin (H)           Follow-ups after your visit        Follow-up notes from your care team     Return in about 3 months (around 6/1/2018).      Your next 10 appointments already scheduled     Mar 02, 2018  7:30 AM CST   RETURN WOUND NURSE VISIT with Patrica Johnson RN   [facility] ([facility])    [address] [facility]  [address] [facility] [address] [phone]            Mar 05, 2018 10:30 AM CST   New Visit with Antwan Macias MD   [facility] ([facility])    [address] [facility] [address] [phone]              Who to contact     If you have questions or need follow up information about today's clinic visit or your schedule please contact [facility] directly at [phone].  Normal or non-critical lab and imaging results will be communicated to you by MyChart, letter or phone within 4 business days after the clinic has received the results. If you do not hear from us within 7 days, please contact the clinic through MyChart or phone. If you have a critical or abnormal lab result, we will notify you by phone as soon as possible.  Submit refill requests through [employer] or call your pharmacy and they will forward the refill request to us. Please allow 3 business days for your  "refill to be completed.          Additional Information About Your Visit        HashdocharEpisona Information     [employer] lets you send messages to your doctor, view your test results, renew your prescriptions, schedule appointments and more. To sign up, go to [email or web address] . Click on \"Log in\" on the left side of the screen, which will take you to the Welcome page. Then click on \"Sign up Now\" on the right side of the page.     You will be asked to enter the access code listed below, as well as some personal information. Please follow the directions to create your username and password.     Your access code is: [email or web address]  Expires: 2018 11:42 AM     Your access code will  in 90 days. If you need help or a new code, please call your [place] clinic or [phone].        Care EveryWhere ID     This is your Care EveryWhere ID. This could be used by other organizations to access your [place] medical records  [id number]        Your Vitals Were     Pulse Temperature Respirations Height Pulse Oximetry BMI (Body Mass Index)    75 98.2  F (36.8  C) (Oral) 18 5' 9\" (1.753 m) 98% 37.66 kg/m2       Blood Pressure from Last 3 Encounters:   18 126/84   18 124/83   18 134/86    Weight from Last 3 Encounters:   18 255 lb (115.7 kg)   18 245 lb 14.4 oz (111.5 kg)   18 242 lb (109.8 kg)              We Performed the Following     FOOT EXAM  NO CHARGE [07575.114]          Today's Medication Changes          These changes are accurate as of 3/1/18  1:23 PM.  If you have any questions, ask your nurse or doctor.               These medicines have changed or have updated prescriptions.        Dose/Directions    lisinopril 5 MG tablet   Commonly known as:  PRINIVIL/ZESTRIL   This may have changed:    - medication strength  - how much to take   Used for:  Type 2 diabetes mellitus without complication, without long-term current use of insulin (H)   Changed by:  Lawanda Barrera MD        " Dose:  5 mg   Take 1 tablet (5 mg) by mouth daily   Quantity:  90 tablet   Refills:  0            Where to get your medicines      These medications were [place] [facility] [place] [address]  [address] [facility] [address]     Phone:  [phone]     lisinopril 5 MG tablet                Primary Care Provider Office Phone # Fax #    Lawanda Barrera [phone]       [address] [facility] [address]  [facility] [address]        Equal Access to Services     [facility]: Hadii aad ku hadasho Soomaali, waaxda luqadaha, qaybta kaalmada adeegyada, waxjames isabel . So [facility] [phone].    ATENCIÓN: Si habla español, tiene a parker disposición servicios gratuitos de asistencia lingüística. [facility] [phone].    We comply with applicable federal civil rights laws and [place] laws. We do not discriminate on the basis of race, color, national origin, age, disability, sex, sexual orientation, or gender identity.            Thank you!     Thank you for choosing [facility] [place] [facility]  for your care. Our goal is always to provide you with excellent care. Hearing back from our patients is one way we can continue to improve our services. Please take a few minutes to complete the written survey that you may receive in the mail after your visit with us. Thank you!             Your Updated Medication List - Protect others around you: Learn how to safely use, store and throw away your medicines at www.disposemymeds.org.          This list is accurate as of 3/1/18  1:23 PM.  Always use your most recent med list.                   Brand Name Dispense Instructions for use Diagnosis    acetaminophen 325 MG tablet    TYLENOL    100 tablet    Take 2 tablets (650 mg) by mouth every 4 hours as needed for other (multimodal surgical pain management along with NSAIDS and opioid medication as indicated based on pain control and physical function.)    Perirectal  abscess       aspirin 81 MG tablet     90 tablet    Take 1 tablet (81 mg) by mouth daily    Type 2 diabetes mellitus without complication, without long-term current use of insulin (H)       blood glucose lancets standard    no brand specified    100 each    Use to test blood sugar one times daily or as directed.    Type 2 diabetes mellitus with complication, without long-term current use of insulin (H)       blood glucose monitoring meter device kit    no brand specified    1 kit    Use to test blood sugar one times daily or as directed.    Type 2 diabetes mellitus with complication, without long-term current use of insulin (H)       blood glucose monitoring test strip    no brand specified    90 each    Use to test blood sugars  3 times daily in am(reason- uncontrolled sugars with recent infections)    Type 2 diabetes mellitus with complication, without long-term current use of insulin (H)       gabapentin 300 MG capsule    NEURONTIN    270 capsule    Take 1 capsule (300 mg) by mouth 3 times daily    Type 2 diabetes mellitus without complication, without long-term current use of insulin (H)       glipiZIDE 2.5 MG 24 hr tablet    GLUCOTROL XL    30 tablet    TAKE ONE TABLET BY MOUTH once daily    Type 2 diabetes mellitus with other specified complication, unspecified long term insulin use status (H)       lisinopril 5 MG tablet    PRINIVIL/ZESTRIL    90 tablet    Take 1 tablet (5 mg) by mouth daily    Type 2 diabetes mellitus without complication, without long-term current use of insulin (H)       metFORMIN 500 MG tablet    GLUCOPHAGE    120 tablet    TAKE TWO TABLETS BY MOUTH TWICE DAILY WITH meals    Type 2 diabetes mellitus with complication, without long-term current use of insulin (H)       oxyCODONE IR 5 MG tablet    ROXICODONE    30 tablet    Take 1-2 tablets (5-10 mg) by mouth every 3 hours as needed for breakthrough pain or moderate to severe pain    Perirectal abscess       polyethylene glycol Packet     MIRALAX/GLYCOLAX    30 packet    Take 17 g by mouth daily as needed for constipation    Drug-induced constipation

## 2018-03-02 ENCOUNTER — OFFICE VISIT (OUTPATIENT)
Dept: WOUND CARE | Facility: CLINIC | Age: 43
End: 2018-03-02
Payer: COMMERCIAL

## 2018-03-02 DIAGNOSIS — T81.30XA DISRUPTION OF WOUND OF PERINEUM IN MALE: Primary | ICD-10-CM

## 2018-03-02 NOTE — MR AVS SNAPSHOT
After Visit Summary   3/2/2018    Estevan Callejas    MRN: [id number]           Patient Information     Date Of Birth          1975        Visit Information        Provider Department      3/2/2018 7:30 AM Patrica Johnson RN [facility]        Today's Diagnoses     Disruption of wound of perineum in male    -  1       Follow-ups after your visit        Your next 10 appointments already scheduled     Mar 02, 2018  7:30 AM CST   RETURN WOUND NURSE VISIT with [facility] ([facility])    [address] [place] [address] [phone]            Mar 05, 2018 10:30 AM CST   New Visit with Antwan Macias MD   [facility] ([facility]    [address] [facility] [address] [phone]            Mar 09, 2018  7:00 AM CST   RETURN WOUND NURSE VISIT with [facility] ([facility])    [address] [place] [address] [phone]              Who to contact     Please call your clinic at [phone] to:    Ask questions about your health    Make or cancel appointments    Discuss your medicines    Learn about your test results    Speak to your doctor            Additional Information About Your Visit        MyChart Information     [employer] is an electronic gateway that provides easy, online access to your medical records. With [employer], you can request a clinic appointment, read your test results, renew a prescription or communicate with your care team.     To sign up for [email or web address] visit the website at [email or web address]   You will be asked to enter the access code listed below, as well as some personal information. Please follow the directions to create your username and password.     Your access code is: [email or web address]  Expires: 2018 11:42 AM     Your access code will   in 90 days. If you need help or a new code, please contact your [facility] or call [phone] for assistance.        Care EveryWhere ID     This is your Care EveryWhere ID. This could be used by other organizations to access your [place] medical records  [id number]         Blood Pressure from Last 3 Encounters:   03/01/18 126/84   01/31/18 124/83   01/30/18 134/86    Weight from Last 3 Encounters:   03/01/18 115.7 kg (255 lb)   01/31/18 111.5 kg (245 lb 14.4 oz)   01/30/18 109.8 kg (242 lb)              Today, you had the following     No orders found for display       Primary Care Provider Office Phone # Fax #    Lawanda Barrera [phone]       [address] [facility] [address]  [facility] [address]        Equal Access to Services     [facility]: Hadii jonas freedmano Sotrang, waaxda luqadaha, qaybta kaalmada adesarahyada, elisabeth isabel . So [facility] [phone].    ATENCIÓN: Si habla español, tiene a parker disposición servicios gratuitos de asistencia lingüística. Llame al [phone].    We comply with applicable federal civil rights laws and [place] laws. We do not discriminate on the basis of race, color, national origin, age, disability, sex, sexual orientation, or gender identity.            Thank you!     Thank you for choosing [facility]  for your care. Our goal is always to provide you with excellent care. Hearing back from our patients is one way we can continue to improve our services. Please take a few minutes to complete the written survey that you may receive in the mail after your visit with us. Thank you!             Your Updated Medication List - Protect others around you: Learn how to safely use, store and throw away your medicines at www.disposemymeds.org.          This list is accurate as of 3/2/18  7:29 AM.  Always use your most recent med list.                   Brand Name Dispense Instructions for use Diagnosis     acetaminophen 325 MG tablet    TYLENOL    100 tablet    Take 2 tablets (650 mg) by mouth every 4 hours as needed for other (multimodal surgical pain management along with NSAIDS and opioid medication as indicated based on pain control and physical function.)    Perirectal abscess       aspirin 81 MG tablet     90 tablet    Take 1 tablet (81 mg) by mouth daily    Type 2 diabetes mellitus without complication, without long-term current use of insulin (H)       blood glucose lancets standard    no brand specified    100 each    Use to test blood sugar one times daily or as directed.    Type 2 diabetes mellitus with complication, without long-term current use of insulin (H)       blood glucose monitoring meter device kit    no brand specified    1 kit    Use to test blood sugar one times daily or as directed.    Type 2 diabetes mellitus with complication, without long-term current use of insulin (H)       blood glucose monitoring test strip    no brand specified    90 each    Use to test blood sugars  3 times daily in am(reason- uncontrolled sugars with recent infections)    Type 2 diabetes mellitus with complication, without long-term current use of insulin (H)       gabapentin 300 MG capsule    NEURONTIN    270 capsule    Take 1 capsule (300 mg) by mouth 3 times daily    Type 2 diabetes mellitus without complication, without long-term current use of insulin (H)       glipiZIDE 2.5 MG 24 hr tablet    GLUCOTROL XL    30 tablet    TAKE ONE TABLET BY MOUTH once daily    Type 2 diabetes mellitus with other specified complication, unspecified long term insulin use status (H)       lisinopril 5 MG tablet    PRINIVIL/ZESTRIL    90 tablet    Take 1 tablet (5 mg) by mouth daily    Type 2 diabetes mellitus without complication, without long-term current use of insulin (H)       metFORMIN 500 MG tablet    GLUCOPHAGE    120 tablet    TAKE TWO TABLETS BY MOUTH TWICE DAILY WITH meals    Type 2 diabetes mellitus with complication,  without long-term current use of insulin (H)       oxyCODONE IR 5 MG tablet    ROXICODONE    30 tablet    Take 1-2 tablets (5-10 mg) by mouth every 3 hours as needed for breakthrough pain or moderate to severe pain    Perirectal abscess       polyethylene glycol Packet    MIRALAX/GLYCOLAX    30 packet    Take 17 g by mouth daily as needed for constipation    Drug-induced constipation

## 2018-03-04 NOTE — PROGRESS NOTES
SUBJECTIVE:   Estevan Callejas is a 42 year old male who presents to clinic today for the following health issues:      History of Present Illness     Diabetes:     Frequency of checking blood sugars::  3 times a day    Diabetic concerns::  None and Blood sugar frequently over 200    Hypoglycemia symptoms::  None    Paraesthesia present::  No    Eye Exam in the last year::  Yes (2/2018)    na        BP Readings from Last 2 Encounters:   03/01/18 126/84   01/31/18 124/83     Hemoglobin A1C (%)   Date Value   01/30/2018 6.7 (H)   06/12/2017 5.8     LDL Cholesterol Calculated (mg/dL)   Date Value   01/30/2018     Cannot estimate LDL when triglyceride exceeds 400 mg/dL   12/16/2016 63     LDL Cholesterol Direct (mg/dL)   Date Value   01/30/2018 119 (H)     Problem list and histories reviewed & adjusted, as indicated.  Additional history: as documented        Patient Active Problem List   Diagnosis     Peripheral neuropathy - near S2 dermatome     Type 2 diabetes, HbA1c goal < 7% (H)     Low back pain     Morbid obesity (H)     Perineal abscess     Wound abscess     Epidermal cyst of face     Past Surgical History:   Procedure Laterality Date     EXAM UNDER ANESTHESIA, CHANGE DRESSING (LOCATION), COMBINED N/A 1/15/2018    Procedure: COMBINED EXAM UNDER ANESTHESIA, CHANGE DRESSING (LOCATION);  Dressing Change and exam under Monitored Anesthesia Care with wound vac placement;  Surgeon: Stefania Munroe MD;  Location: [facility] OR     EXAM UNDER ANESTHESIA, CHANGE DRESSING (LOCATION), COMBINED N/A 1/18/2018    Procedure: COMBINED EXAM UNDER ANESTHESIA, CHANGE DRESSING (LOCATION);  Serial wound vac change perineal;  Surgeon: Stefania Munroe MD;  Location: [facility] OR     IRRIGATION AND DEBRIDEMENT PERINEAL, COMBINED N/A 1/13/2018    Procedure: COMBINED IRRIGATION AND DEBRIDEMENT PERINEAL;  Irrigation and Debridement of perianal abscess;  Surgeon: Stefania Munroe MD;  Location: [facility] OR     IRRIGATION  AND DEBRIDEMENT PERINEAL, COMBINED N/A 1/14/2018    Procedure: COMBINED IRRIGATION AND DEBRIDEMENT PERINEAL;  combined irrigation and debridement perineal and dressing change;  Surgeon: Stefania Munroe MD;  Location: [facility] OR     ORTHOPEDIC SURGERY       TONSILLECTOMY, ADENOIDECTOMY, COMBINED         Social History   Substance Use Topics     Smoking status: Current Some Day Smoker     Types: Dip, chew, snus or snuff, Cigarettes     Smokeless tobacco: Current User     Alcohol use Yes     History reviewed. No pertinent family history.      Current Outpatient Prescriptions   Medication Sig Dispense Refill     lisinopril (PRINIVIL/ZESTRIL) 5 MG tablet Take 1 tablet (5 mg) by mouth daily 90 tablet 0     metFORMIN (GLUCOPHAGE) 500 MG tablet TAKE TWO TABLETS BY MOUTH TWICE DAILY WITH meals 120 tablet 0     glipiZIDE (GLUCOTROL XL) 2.5 MG 24 hr tablet TAKE ONE TABLET BY MOUTH once daily 30 tablet 0     oxyCODONE IR (ROXICODONE) 5 MG tablet Take 1-2 tablets (5-10 mg) by mouth every 3 hours as needed for breakthrough pain or moderate to severe pain 30 tablet 0     blood glucose monitoring (NO BRAND SPECIFIED) test strip Use to test blood sugars  3 times daily in am(reason- uncontrolled sugars with recent infections) 90 each 3     blood glucose (NO BRAND SPECIFIED) lancets standard Use to test blood sugar one times daily or as directed. 100 each 3     acetaminophen (TYLENOL) 325 MG tablet Take 2 tablets (650 mg) by mouth every 4 hours as needed for other (multimodal surgical pain management along with NSAIDS and opioid medication as indicated based on pain control and physical function.) 100 tablet      polyethylene glycol (MIRALAX/GLYCOLAX) Packet Take 17 g by mouth daily as needed for constipation 30 packet 1     gabapentin (NEURONTIN) 300 MG capsule Take 1 capsule (300 mg) by mouth 3 times daily 270 capsule 1     aspirin 81 MG tablet Take 1 tablet (81 mg) by mouth daily 90 tablet 3     blood glucose monitoring  "(NO BRAND SPECIFIED) meter device kit Use to test blood sugar one times daily or as directed. 1 kit 0     No Known Allergies  Recent Labs   Lab Test  01/30/18   1456  01/18/18   1111  01/15/18   0737   01/13/18   0126  06/12/17   1637  03/09/17   1753  12/16/16   1436  11/07/16   1651  03/13/15   0822   02/18/15   1430   A1C  6.7*   --    --    --    --   5.8  6.3*   --   10.3*   --    < >   --    LDL  Cannot estimate LDL when triglyceride exceeds 400 mg/dL  119*   --    --    --    --    --    --   63   --   66   < >   --    HDL  67   --    --    --    --    --    --   65   --   64   --    --    TRIG  505*   --    --    --    --    --    --   104   --   117   --    --    ALT   --    --    --    --   107*   --    --    --   57   --    --   58   CR  0.67   --   0.55*   < >  0.71   --    --    --   0.80   --    --   0.59*   GFRESTIMATED  >90   --   >90   < >  >90   --    --    --   >90  Non  GFR Calc     --    --   >90  Non  GFR Calc     GFRESTBLACK  >90   --   >90   < >  >90   --    --    --   >90   GFR Calc     --    --   >90   GFR Calc     POTASSIUM  4.1  3.6  3.5   < >  3.7   --    --    --   4.3   --    --   3.8   TSH   --    --    --    --    --    --   3.28   --    --    --    --   1.13    < > = values in this interval not displayed.      BP Readings from Last 3 Encounters:   03/01/18 126/84   01/31/18 124/83   01/30/18 134/86    Wt Readings from Last 3 Encounters:   03/01/18 255 lb (115.7 kg)   01/31/18 245 lb 14.4 oz (111.5 kg)   01/30/18 242 lb (109.8 kg)                  Labs reviewed in EPIC    ROS:  Constitutional, HEENT, cardiovascular, pulmonary, GI, , musculoskeletal, neuro, skin, endocrine and psych systems are negative, except as otherwise noted.    OBJECTIVE:     /84 (BP Location: Right arm, Patient Position: Chair, Cuff Size: Adult Regular)  Pulse 75  Temp 98.2  F (36.8  C) (Oral)  Resp 18  Ht 5' 9\" (1.753 m)  Wt 255 lb " (115.7 kg)  SpO2 98%  BMI 37.66 kg/m2  Body mass index is 37.66 kg/(m^2).   Physical Exam   Constitutional: He is oriented to person, place, and time. He appears well-developed and well-nourished.   HENT:   Head: Normocephalic and atraumatic.   Cardiovascular: Normal rate, regular rhythm, normal heart sounds and intact distal pulses.    Pulmonary/Chest: Effort normal and breath sounds normal.   Neurological: He is alert and oriented to person, place, and time.   Psychiatric: He has a normal mood and affect.         Diagnostic Test Results:  none     ASSESSMENT/PLAN:     Problem List Items Addressed This Visit     Type 2 diabetes, HbA1c goal < 7% (H) - Primary     Improved sugars but still has mildly elevated sugars  Increase dose of gliizide to 5mg   Recheck in 3 months         Relevant Medications    lisinopril (PRINIVIL/ZESTRIL) 5 MG tablet      Other Visit Diagnoses     Screening for diabetic peripheral neuropathy        Relevant Orders    FOOT EXAM  NO CHARGE [99350.114] (Completed)         Lawanda Barrera MD  [facility]  Answers for HPI/ROS submitted by the patient on 3/1/2018   Chronic problems general questions HPI Form  Frequency of checking blood sugars:: 3 times a day  Diabetic concerns:: None, Blood sugar frequently over 200  Hypoglycemia symptoms:: None  Paraesthesia present:: No  Eye Exam in the last year:: Yes  Date of last Diabetic Eye Exam:: na

## 2018-03-04 NOTE — ASSESSMENT & PLAN NOTE
Improved sugars but still has mildly elevated sugars  Increase dose of gliizide to 5mg   Recheck in 3 months

## 2018-03-06 ENCOUNTER — TELEPHONE (OUTPATIENT)
Dept: FAMILY MEDICINE | Facility: OTHER | Age: 43
End: 2018-03-06

## 2018-03-06 DIAGNOSIS — E11.69 TYPE 2 DIABETES MELLITUS WITH OTHER SPECIFIED COMPLICATION, UNSPECIFIED LONG TERM INSULIN USE STATUS: ICD-10-CM

## 2018-03-06 DIAGNOSIS — E11.9 TYPE 2 DIABETES MELLITUS WITHOUT COMPLICATION, WITHOUT LONG-TERM CURRENT USE OF INSULIN (H): ICD-10-CM

## 2018-03-06 RX ORDER — LISINOPRIL 2.5 MG/1
2.5 TABLET ORAL DAILY
Qty: 90 TABLET | Refills: 0
Start: 2018-03-06 | End: 2018-05-03

## 2018-03-06 RX ORDER — GLIPIZIDE 5 MG/1
5 TABLET, FILM COATED, EXTENDED RELEASE ORAL DAILY
Qty: 90 TABLET | Refills: 0 | Status: SHIPPED | OUTPATIENT
Start: 2018-03-06 | End: 2018-06-29

## 2018-03-06 NOTE — TELEPHONE ENCOUNTER
Reason for Call:  Other dosage of medication    Detailed comments:  Patient calling.  He states when he was in to see Dr Barrera he thought she was going to increase his glipizide to 5 mg from 2.5mg.  But he noticed she increased his lisinopril to 5mg and not the glipizide.  He was not aware of the lisinopril increase and is calling to clarify if this is correct.  Also, he is wondering if he can get a rx for 4 x4 guaze sponge and long wooden Q-tips so he can use his flex spending card to pay for it.     Phone Number Patient can be reached at: Home number on file [phone] (home)    Best Time: any    Can we leave a detailed message on this number? YES    Call taken on 3/6/2018 at 9:07 AM by Jeni Henry

## 2018-03-06 NOTE — TELEPHONE ENCOUNTER
Spoke to patient and informed of below. He also asking if you can put DME order in for  for 4 x4 guaze sponge and long wooden Q-tips so he can use his flex spending card to pay for it.   I told him that I would check with you to see if you could that for him and call you back.

## 2018-03-06 NOTE — TELEPHONE ENCOUNTER
You saw patient on 3/1/18     Type 2 diabetes, HbA1c goal < 7% (H) - Primary        Improved sugars but still has mildly elevated sugars  Increase dose of gliizide to 5mg   Recheck in 3 months        Pharmacy pended

## 2018-03-07 ENCOUNTER — TELEPHONE (OUTPATIENT)
Dept: WOUND CARE | Facility: CLINIC | Age: 43
End: 2018-03-07

## 2018-03-07 NOTE — TELEPHONE ENCOUNTER
Patient called asking about a kind of white/gray periwound while packing wound with moist gauze. I told patient while packing the wound with moist dressing that perriwound can get macerated. Instructed patiet to use barrier whip, zinc oxide cream or vaseline around wound when packing with moist gauze to protect the periwound skin from maceration. Patient has wound apt on Friday 3/9/18.

## 2018-03-08 RX ORDER — SODIUM PHOSPHATE,MONO-DIBASIC 19G-7G/118
1 ENEMA (ML) RECTAL PRN
Qty: 100 EACH | Refills: 3 | Status: SHIPPED | OUTPATIENT
Start: 2018-03-08

## 2018-03-09 ENCOUNTER — OFFICE VISIT (OUTPATIENT)
Dept: WOUND CARE | Facility: CLINIC | Age: 43
End: 2018-03-09
Payer: COMMERCIAL

## 2018-03-09 DIAGNOSIS — T81.30XA DISRUPTION OF WOUND OF PERINEUM IN MALE: Primary | ICD-10-CM

## 2018-03-09 NOTE — PROGRESS NOTES
"[facility] x1.5 cm x1 cm with 2 cm pocket   Patient comes to wound clinic for wound assessment per request of Dr. Munroe. 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 Previous treatment includes: wound vac MWF  This is treatment week: 6    Objective findings: improving wound ,      Location: midline posterior scrotum and perineum     Wound measured.: 3.0 x 1.0 x 0.2cm, Full thickness.    pocket in the lower posterior wall 0.5 cm around and 2.5 cm deep.     Patient states that area of sensitivity in this area now gone    Tenderness: sometimes    Odor: none    Drainage is small amt serosanguinous    Wound Base: moist and granulation     Palpation of the wound bed: normal    Periwound Skin: intact      Color: normal and consistent with surrounding tissue     Subjective finding:   Patient is assessed for discomfort which is: none    Today's status of the wound:  improving    Treatment:  Removed dressing, cleanse with Microklenz spray, packed with 1/4\" HydroferaBlue dressing , moist gauze on the remainder of the wound, covered with 2 dry gauzes and tape. Pt will do BID dressing changes. Letter written for pt to go back to work on Monday      PLAN: Dressing changes 1 times/week in clinic.      Pt received the following instructions: Wound washed with [employer], irrigated with saline, insert but not tightly pack  with 1/4\" strip HydroferaBlue dressing , moist gauze on the remainder of the wound, covered with 2 dry gauzes and tape. Pt will do BID dressing changes,Signs and symptoms of infection taught. Patient needs to be seen 2 weeks. Treated and follow-up appointment made.   Almas Martinez was available for supervision of care if needed or if questions should arise regarding plan of care.  Patrica Johnson  RN CWON  "

## 2018-03-09 NOTE — MR AVS SNAPSHOT
After Visit Summary   3/9/2018    Estevan Callejas    MRN: [id number]           Patient Information     Date Of Birth          1975        Visit Information        Provider Department      3/9/2018 7:00 AM Patrica Johnson RN [facility]        Today's Diagnoses     Disruption of wound of perineum in male    -  1       Follow-ups after your visit        Your next 10 appointments already scheduled     Mar 23, 2018  7:00 AM CDT   RETURN WOUND NURSE VISIT with [facility] ([facility])    [address]  [address] [facility] [id number] [phone]              Who to contact     Please call your clinic at [phone] to:    Ask questions about your health    Make or cancel appointments    Discuss your medicines    Learn about your test results    Speak to your doctor            Additional Information About Your Visit        MyChart Information     Xiaohongshu is an electronic gateway that provides easy, online access to your medical records. With Xiaohongshu, you can request a clinic appointment, read your test results, renew a prescription or communicate with your care team.     To sign up for Kixert visit the website at [email or web address]   You will be asked to enter the access code listed below, as well as some personal information. Please follow the directions to create your username and password.     Your access code is: [email or web address]  Expires: 2018 11:42 AM     Your access code will  in 90 days. If you need help or a new code, please contact your [facility] or call [phone] for assistance.        Care EveryWhere ID     This is your Care EveryWhere ID. This could be used by other organizations to access your [facility] medical records  [id number]         Blood Pressure from Last 3 Encounters:   18 126/84   18 124/83   18 134/86    Weight from Last 3  Encounters:   03/01/18 115.7 kg (255 lb)   01/31/18 111.5 kg (245 lb 14.4 oz)   01/30/18 109.8 kg (242 lb)              Today, you had the following     No orders found for display       Primary Care Provider Office Phone # Fax #    Lawanda Barrera [phone]       [address]  [facility] [address]        Equal Access to Services     RUTH JORGE : Hadii aad ku hadasho Soomaali, waaxda luqadaha, qaybta kaalmada adeegyada, waxay aichain hayluannn aurora isabel . So [facility] [phone].    ATENCIÓN: Si stormy cat, tiene a parker disposición servicios gratuitos de asistencia lingüística. [facility] [phone].    We comply with applicable federal civil rights laws and [place] laws. We do not discriminate on the basis of race, color, national origin, age, disability, sex, sexual orientation, or gender identity.            Thank you!     Thank you for choosing [facility]  for your care. Our goal is always to provide you with excellent care. Hearing back from our patients is one way we can continue to improve our services. Please take a few minutes to complete the written survey that you may receive in the mail after your visit with us. Thank you!             Your Updated Medication List - Protect others around you: Learn how to safely use, store and throw away your medicines at www.disposemymeds.org.          This list is accurate as of 3/9/18  7:19 AM.  Always use your most recent med list.                   Brand Name Dispense Instructions for use Diagnosis    acetaminophen 325 MG tablet    TYLENOL    100 tablet    Take 2 tablets (650 mg) by mouth every 4 hours as needed for other (multimodal surgical pain management along with NSAIDS and opioid medication as indicated based on pain control and physical function.)    Perirectal abscess       aspirin 81 MG tablet     90 tablet    Take 1 tablet (81 mg) by mouth daily    Type 2 diabetes mellitus without complication, without  "long-term current use of insulin (H)       blood glucose lancets standard    no brand specified    100 each    Use to test blood sugar one times daily or as directed.    Type 2 diabetes mellitus with complication, without long-term current use of insulin (H)       blood glucose monitoring meter device kit    no brand specified    1 kit    Use to test blood sugar one times daily or as directed.    Type 2 diabetes mellitus with complication, without long-term current use of insulin (H)       blood glucose monitoring test strip    no brand specified    90 each    Use to test blood sugars  3 times daily in am(reason- uncontrolled sugars with recent infections)    Type 2 diabetes mellitus with complication, without long-term current use of insulin (H)       Cotton Swabs Swab     100 each    1 each as needed    Type 2 diabetes mellitus without complication, without long-term current use of insulin (H)       gabapentin 300 MG capsule    NEURONTIN    270 capsule    Take 1 capsule (300 mg) by mouth 3 times daily    Type 2 diabetes mellitus without complication, without long-term current use of insulin (H)       gauze pads & dressings 4\"X4\" Pads     1 each    100 each as needed    Type 2 diabetes mellitus without complication, without long-term current use of insulin (H)       glipiZIDE 5 MG 24 hr tablet    GLUCOTROL XL    90 tablet    Take 1 tablet (5 mg) by mouth daily    Type 2 diabetes mellitus with other specified complication, unspecified long term insulin use status (H)       lisinopril 2.5 MG tablet    PRINIVIL/Zestril    90 tablet    Take 1 tablet (2.5 mg) by mouth daily    Type 2 diabetes mellitus without complication, without long-term current use of insulin (H)       metFORMIN 500 MG tablet    GLUCOPHAGE    120 tablet    TAKE TWO TABLETS BY MOUTH TWICE DAILY WITH meals    Type 2 diabetes mellitus with complication, without long-term current use of insulin (H)       oxyCODONE IR 5 MG tablet    ROXICODONE    30 " tablet    Take 1-2 tablets (5-10 mg) by mouth every 3 hours as needed for breakthrough pain or moderate to severe pain    Perirectal abscess       polyethylene glycol Packet    MIRALAX/GLYCOLAX    30 packet    Take 17 g by mouth daily as needed for constipation    Drug-induced constipation

## 2018-03-19 NOTE — PLAN OF CARE
Problem: Patient Care Overview  Goal: Plan of Care Review  Outcome: Ongoing (interventions implemented as appropriate)   03/19/18 9764   Coping/Psychosocial   Plan of Care Reviewed With patient   Plan of Care Review   Progress improving   OTHER   Outcome Summary VSS, tylenol X 1, diet advanced, no nausea or vomiting.     Goal: Individualization and Mutuality  Outcome: Ongoing (interventions implemented as appropriate)      Problem: Pain, Acute (Adult)  Goal: Identify Related Risk Factors and Signs and Symptoms  Outcome: Outcome(s) achieved Date Met: 03/19/18    Goal: Acceptable Pain Control/Comfort Level  Outcome: Ongoing (interventions implemented as appropriate)         Problem: Patient Care Overview  Goal: Plan of Care/Patient Progress Review  OT: after chart review, conversation with pt, staff and observation of this pt it is concluded that this pt has no acute OT needs. Pt mod I bed mobility, per RN had showered I standing previously this day. Pt endorses significant assist from family and SO upon DISCH. Pt briefly educated in general safety with position changes and transfers as well as potential AE needs for showering including suction grab bar, pt showing sufficient insight to all education and safety needs. Patient pleasantly refusing further OT education at this time and indeed does not have needs. Pt able to pivot in standing, reaching below knees with no LOB. Mild LOB upon sit to stand using modified technique to avoid sitting (as this causes pt pain) however pt able to self correct I.

## 2018-03-23 ENCOUNTER — OFFICE VISIT (OUTPATIENT)
Dept: WOUND CARE | Facility: CLINIC | Age: 43
End: 2018-03-23
Payer: COMMERCIAL

## 2018-03-23 DIAGNOSIS — T81.30XA DISRUPTION OF WOUND OF PERINEUM IN MALE: Primary | ICD-10-CM

## 2018-03-23 NOTE — PROGRESS NOTES
"5 x1.5 cm x1 cm with 2 cm pocket   Patient comes to wound clinic for wound assessment per request of Dr. Munroe. 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 Previous treatment includes: wound vac MWF  This is treatment week: 8    Objective findings: wound healed but  still small tunnel      Location: midline posterior scrotum and perineum     Wound measured.: healed.    pocket in the lower posterior wall 0.3 cm around and 1 cm deep.    Tenderness: sometimes in area of scar    Odor: none    Drainage is small amt serosanguinous    Wound Base: not visible           Subjective finding:   Patient is assessed for discomfort which is: none    Today's status of the wound:  Essentially healed    Treatment:  Removed dressing,  packed with 1/4\" HydroferaBlue dressing short covered with 2 dry gauzes and tape. Pt will do daily dressing changes.       PLAN:    Pt received the following instructions: Insert but not tightly pack  with 1/4\" strip HydroferaBlue dressing about 1 cm deep, once per day. Cover with dry gauze.  Patient was told that this small tunnel will likely close within 1 week.  No need to force Hydrofera Blue in the small opening.  If it falls out and the tunnel opening becomes too small no more need for dressing changes may be only cover with dry gauze.  He can expect small amounts of drainage throughout the coming week.  This will decrease with time.  Pt will do daily dressing changes,Signs and symptoms of infection taught. Patient dc'd from clinic LEANNA Martinez was available for supervision of care if needed or if questions should arise regarding plan of care.  Patrica Johnson  RN CWON  "

## 2018-03-23 NOTE — MR AVS SNAPSHOT
After Visit Summary   3/23/2018    Estevan Callejas    MRN: [id number]           Patient Information     Date Of Birth          1975        Visit Information        Provider Department      3/23/2018 7:00 AM Patrica Johnson RN [facility]        Today's Diagnoses     Disruption of wound of perineum in male    -  1       Follow-ups after your visit        Who to contact     Please call your clinic at [phone] to:    Ask questions about your health    Make or cancel appointments    Discuss your medicines    Learn about your test results    Speak to your doctor            Additional Information About Your Visit        MyChart Information     [employer] is an electronic gateway that provides easy, online access to your medical records. With [employer], you can request a clinic appointment, read your test results, renew a prescription or communicate with your care team.     To sign up for LiquidCool Solutionst visit the website at [email or web address]   You will be asked to enter the access code listed below, as well as some personal information. Please follow the directions to create your username and password.     Your access code is: [email or web address]  Expires: 2018 12:42 PM     Your access code will  in 90 days. If you need help or a new code, please contact your [facility] or call [phone] for assistance.        Care EveryWhere ID     This is your Care EveryWhere ID. This could be used by other organizations to access your [facility] medical records  [id number]         Blood Pressure from Last 3 Encounters:   18 126/84   18 124/83   18 134/86    Weight from Last 3 Encounters:   18 115.7 kg (255 lb)   18 111.5 kg (245 lb 14.4 oz)   18 109.8 kg (242 lb)              Today, you had the following     No orders found for display       Primary Care Provider Office Phone # Fax #    Lawanda Barrera [phone]        [address]  [place] [address]        Equal Access to Services     DAVID JORGE : Hadii jonas Acuna, yane mcguire, elfego fernández, elisabeth miller. So [facility] [phone].    ATENCIÓN: Si habla español, tiene a parker disposición servicios gratuitos de asistencia lingüística. Llame al [phone].    We comply with applicable federal civil rights laws and [place] laws. We do not discriminate on the basis of race, color, national origin, age, disability, sex, sexual orientation, or gender identity.            Thank you!     Thank you for choosing [facility]  for your care. Our goal is always to provide you with excellent care. Hearing back from our patients is one way we can continue to improve our services. Please take a few minutes to complete the written survey that you may receive in the mail after your visit with us. Thank you!             Your Updated Medication List - Protect others around you: Learn how to safely use, store and throw away your medicines at www.disposemymeds.org.          This list is accurate as of 3/23/18  7:29 AM.  Always use your most recent med list.                   Brand Name Dispense Instructions for use Diagnosis    acetaminophen 325 MG tablet    TYLENOL    100 tablet    Take 2 tablets (650 mg) by mouth every 4 hours as needed for other (multimodal surgical pain management along with NSAIDS and opioid medication as indicated based on pain control and physical function.)    Perirectal abscess       aspirin 81 MG tablet     90 tablet    Take 1 tablet (81 mg) by mouth daily    Type 2 diabetes mellitus without complication, without long-term current use of insulin (H)       blood glucose lancets standard    no brand specified    100 each    Use to test blood sugar one times daily or as directed.    Type 2 diabetes mellitus with complication, without long-term current use of insulin (H)       blood glucose  "monitoring meter device kit    no brand specified    1 kit    Use to test blood sugar one times daily or as directed.    Type 2 diabetes mellitus with complication, without long-term current use of insulin (H)       blood glucose monitoring test strip    no brand specified    90 each    Use to test blood sugars  3 times daily in am(reason- uncontrolled sugars with recent infections)    Type 2 diabetes mellitus with complication, without long-term current use of insulin (H)       Cotton Swabs Swab     100 each    1 each as needed    Type 2 diabetes mellitus without complication, without long-term current use of insulin (H)       gabapentin 300 MG capsule    NEURONTIN    270 capsule    Take 1 capsule (300 mg) by mouth 3 times daily    Type 2 diabetes mellitus without complication, without long-term current use of insulin (H)       gauze pads & dressings 4\"X4\" Pads     1 each    100 each as needed    Type 2 diabetes mellitus without complication, without long-term current use of insulin (H)       glipiZIDE 5 MG 24 hr tablet    GLUCOTROL XL    90 tablet    Take 1 tablet (5 mg) by mouth daily    Type 2 diabetes mellitus with other specified complication, unspecified long term insulin use status (H)       lisinopril 2.5 MG tablet    PRINIVIL/Zestril    90 tablet    Take 1 tablet (2.5 mg) by mouth daily    Type 2 diabetes mellitus without complication, without long-term current use of insulin (H)       metFORMIN 500 MG tablet    GLUCOPHAGE    120 tablet    TAKE TWO TABLETS BY MOUTH TWICE DAILY WITH meals    Type 2 diabetes mellitus with complication, without long-term current use of insulin (H)       oxyCODONE IR 5 MG tablet    ROXICODONE    30 tablet    Take 1-2 tablets (5-10 mg) by mouth every 3 hours as needed for breakthrough pain or moderate to severe pain    Perirectal abscess       polyethylene glycol Packet    MIRALAX/GLYCOLAX    30 packet    Take 17 g by mouth daily as needed for constipation    Drug-induced " constipation

## 2018-03-28 DIAGNOSIS — E11.9 TYPE 2 DIABETES MELLITUS WITHOUT COMPLICATION, WITHOUT LONG-TERM CURRENT USE OF INSULIN (H): ICD-10-CM

## 2018-03-29 RX ORDER — GABAPENTIN 300 MG/1
CAPSULE ORAL
Qty: 270 CAPSULE | Refills: 1 | Status: SHIPPED | OUTPATIENT
Start: 2018-03-29 | End: 2018-11-18

## 2018-03-29 NOTE — TELEPHONE ENCOUNTER
Gabapentin  Routing refill request to provider for review/approval because:  Appears to be a break in medication - should have been out around 11/2017    [place] Gabapentin filled 3/5/18, 12/22/17, 10/9/17 # 90 tabs each.     Magdalena Aguirre, RN, BSN

## 2018-04-21 DIAGNOSIS — E11.8 TYPE 2 DIABETES MELLITUS WITH COMPLICATION, WITHOUT LONG-TERM CURRENT USE OF INSULIN (H): ICD-10-CM

## 2018-04-23 NOTE — TELEPHONE ENCOUNTER
"Requested Prescriptions   Pending Prescriptions Disp Refills     metFORMIN (GLUCOPHAGE) 500 MG tablet [Pharmacy Med Name: METFORMIN  MG TABLET] 120 tablet      Sig: TAKE TWO TABLETS BY MOUTH TWICE DAILY WITH meals    Biguanide Agents Passed    4/21/2018  9:08 AM       Passed - Blood pressure less than 140/90 in past 6 months    BP Readings from Last 3 Encounters:   03/01/18 126/84   01/31/18 124/83   01/30/18 134/86                Passed - Patient has documented LDL within the past 12 mos.    Recent Labs   Lab Test  01/30/18   1456   LDL  Cannot estimate LDL when triglyceride exceeds 400 mg/dL  119*            Passed - Patient has had a Microalbumin in the past 12 mos.    Recent Labs   Lab Test  01/30/18   1542   MICROL  21   UMALCR  9.72            Passed - Patient is age 10 or older       Passed - Patient has documented A1c within the specified period of time.    Recent Labs   Lab Test  01/30/18   1456   A1C  6.7*            Passed - Patient's CR is NOT>1.4 OR Patient's EGFR is NOT<45 within past 12 mos.    Recent Labs   Lab Test  01/30/18   1456   GFRESTIMATED  >90   GFRESTBLACK  >90       Recent Labs   Lab Test  01/30/18   1456   CR  0.67            Passed - Patient does NOT have a diagnosis of CHF.       Passed - Recent (6 mo) or future (30 days) visit within the authorizing provider's specialty    Patient had office visit in the last 6 months or has a visit in the next 30 days with authorizing provider or within the authorizing provider's specialty.  See \"Patient Info\" tab in inbasket, or \"Choose Columns\" in Meds & Orders section of the refill encounter.            Prescription approved per [facility].    Fabiola Holland RN    "

## 2018-05-03 DIAGNOSIS — E11.9 TYPE 2 DIABETES MELLITUS WITHOUT COMPLICATION, WITHOUT LONG-TERM CURRENT USE OF INSULIN (H): ICD-10-CM

## 2018-05-04 RX ORDER — LISINOPRIL 2.5 MG/1
2.5 TABLET ORAL DAILY
Qty: 90 TABLET | Refills: 0 | Status: SHIPPED | OUTPATIENT
Start: 2018-05-04 | End: 2018-08-07

## 2018-05-04 NOTE — TELEPHONE ENCOUNTER
Lisinopril    BP Readings from Last 3 Encounters:   03/01/18 126/84   01/31/18 124/83   01/30/18 134/86       Prescription approved per [facility].    Laurie Macdonald RN, BSN

## 2018-05-25 ENCOUNTER — TELEPHONE (OUTPATIENT)
Dept: WOUND CARE | Facility: CLINIC | Age: 43
End: 2018-05-25

## 2018-05-25 NOTE — TELEPHONE ENCOUNTER
Patient called wound clinic.  Since the change in weather he has noticed that he has slight bleeding from what was previous scrotal  wound.  He states that it is not deep it is superficial.  I recommended that he puts the Hydrofera Blue on it with a little gauze for the next week to see if that will bring it back together again.  He preferred not to be seen in clinic for now.  He will call next week to give me an update.Patrica Johnson

## 2018-06-18 ENCOUNTER — TELEPHONE (OUTPATIENT)
Dept: FAMILY MEDICINE | Facility: OTHER | Age: 43
End: 2018-06-18

## 2018-06-18 NOTE — TELEPHONE ENCOUNTER
Summary:    Patient is due/failing the following:   Diabetic follow up and A1C    Action needed:   Patient needs office visit for Diabetic follow up.    Type of outreach:    Phone, left message for patient to call back.     Questions for provider review:    None                                                                                                                                    Marcela Collins       Chart routed to Care Team .          Panel Management Review      Patient has the following on his problem list:     Diabetes    ASA: Passed    Last A1C  Lab Results   Component Value Date    A1C 6.7 01/30/2018    A1C 5.8 06/12/2017    A1C 6.3 03/09/2017    A1C 10.3 11/07/2016    A1C 6.7 02/18/2015     A1C tested: Passed    Last LDL:    Lab Results   Component Value Date    CHOL 221 01/30/2018     Lab Results   Component Value Date    HDL 67 01/30/2018     Lab Results   Component Value Date    LDL  01/30/2018     Cannot estimate LDL when triglyceride exceeds 400 mg/dL     01/30/2018     Lab Results   Component Value Date    TRIG 505 01/30/2018     Lab Results   Component Value Date    CHOLHDLRATIO 2.4 03/13/2015     Lab Results   Component Value Date    NHDL 154 01/30/2018       Is the patient on a Statin? NO             Is the patient on Aspirin? YES    Medications     Salicylates    aspirin 81 MG tablet          Last three blood pressure readings:  BP Readings from Last 3 Encounters:   03/01/18 126/84   01/31/18 124/83   01/30/18 134/86            Tobacco History:     History   Smoking Status     Current Some Day Smoker     Types: Dip, chew, snus or snuff, Cigarettes   Smokeless Tobacco     Current User           Composite cancer screening  Chart review shows that this patient is due/due soon for the following None

## 2018-06-26 NOTE — PROGRESS NOTES
SUBJECTIVE:   Estevan Callejas is a 42 year old male who presents to clinic today for the following health issues:      History of Present Illness     Diet:  Other  Frequency of exercise:  None  Taking medications regularly:  Yes  Medication side effects:  None  Additional concerns today:  No    Answers for HPI/ROS submitted by the patient on 6/29/2018   PHQ-2 Score: 0    Diabetes Follow-up    Patient is checking blood sugars: once daily.    Results are as follows:         am - 130-150    Diabetic concerns: None     Symptoms of hypoglycemia (low blood sugar): none     Paresthesias (numbness or burning in feet) or sores: No     Date of last diabetic eye exam: March 24th, 2018    BP Readings from Last 2 Encounters:   06/29/18 124/78   03/01/18 126/84     Hemoglobin A1C (%)   Date Value   06/29/2018 5.7 (H)   01/30/2018 6.7 (H)     LDL Cholesterol Calculated (mg/dL)   Date Value   01/30/2018     Cannot estimate LDL when triglyceride exceeds 400 mg/dL   12/16/2016 63     LDL Cholesterol Direct (mg/dL)   Date Value   01/30/2018 119 (H)           Diabetes Management Resources  Problem list and histories reviewed & adjusted, as indicated.  Additional history: as documented        Patient Active Problem List   Diagnosis     Peripheral neuropathy - near S2 dermatome     Type 2 diabetes, HbA1c goal < 7% (H)     Low back pain     Morbid obesity (H)     Perineal abscess     Wound abscess     Epidermal cyst of face     Past Surgical History:   Procedure Laterality Date     EXAM UNDER ANESTHESIA, CHANGE DRESSING (LOCATION), COMBINED N/A 1/15/2018    Procedure: COMBINED EXAM UNDER ANESTHESIA, CHANGE DRESSING (LOCATION);  Dressing Change and exam under Monitored Anesthesia Care with wound vac placement;  Surgeon: Stefania Munroe MD;  Location: [facility] OR     EXAM UNDER ANESTHESIA, CHANGE DRESSING (LOCATION), COMBINED N/A 1/18/2018    Procedure: COMBINED EXAM UNDER ANESTHESIA, CHANGE DRESSING (LOCATION);  Serial wound vac  "change perineal;  Surgeon: Stefania Munroe MD;  Location: [facility] OR     IRRIGATION AND DEBRIDEMENT PERINEAL, COMBINED N/A 1/13/2018    Procedure: COMBINED IRRIGATION AND DEBRIDEMENT PERINEAL;  Irrigation and Debridement of perianal abscess;  Surgeon: Stefania Munroe MD;  Location: [facility] OR     IRRIGATION AND DEBRIDEMENT PERINEAL, COMBINED N/A 1/14/2018    Procedure: COMBINED IRRIGATION AND DEBRIDEMENT PERINEAL;  combined irrigation and debridement perineal and dressing change;  Surgeon: Stefania Munroe MD;  Location: [facility] OR     ORTHOPEDIC SURGERY       TONSILLECTOMY, ADENOIDECTOMY, COMBINED         Social History   Substance Use Topics     Smoking status: Current Some Day Smoker     Types: Dip, chew, snus or snuff, Cigarettes     Smokeless tobacco: Current User     Alcohol use Yes     History reviewed. No pertinent family history.      Current Outpatient Prescriptions   Medication Sig Dispense Refill     acetaminophen (TYLENOL) 325 MG tablet Take 2 tablets (650 mg) by mouth every 4 hours as needed for other (multimodal surgical pain management along with NSAIDS and opioid medication as indicated based on pain control and physical function.) 100 tablet      Applicators (COTTON SWABS) SWAB 1 each as needed 100 each 3     aspirin 81 MG tablet Take 1 tablet (81 mg) by mouth daily 90 tablet 3     atorvastatin (LIPITOR) 20 MG tablet Take 1 tablet (20 mg) by mouth daily 90 tablet 3     blood glucose (NO BRAND SPECIFIED) lancets standard Use to test blood sugar one times daily or as directed. 100 each 3     blood glucose monitoring (NO BRAND SPECIFIED) meter device kit Use to test blood sugar one times daily or as directed. 1 kit 0     gabapentin (NEURONTIN) 300 MG capsule TAKE ONE CAPSULE BY MOUTH THREE TIMES DAILY 270 capsule 1     gauze pads & dressings 4\"X4\" PADS 100 each as needed 1 each 3     glipiZIDE (GLUCOTROL XL) 5 MG 24 hr tablet Take 1 tablet (5 mg) by mouth daily 90 tablet 0 " "    lisinopril (PRINIVIL/ZESTRIL) 2.5 MG tablet Take 1 tablet (2.5 mg) by mouth daily 90 tablet 0     metFORMIN (GLUCOPHAGE) 500 MG tablet Take 2 tablets (1,000 mg) by mouth daily (with breakfast) 180 tablet 1     ONETOUCH ULTRA test strip USE TO TEST BLOOD SUGARS 3 TIMES DAILY IN THE MORNING. REASON:UNCONTROLLED SUGARS WITH RECENT INFECTIONS 100 strip 3     polyethylene glycol (MIRALAX/GLYCOLAX) Packet Take 17 g by mouth daily as needed for constipation 30 packet 1     No Known Allergies  BP Readings from Last 3 Encounters:   06/29/18 124/78   03/01/18 126/84   01/31/18 124/83    Wt Readings from Last 3 Encounters:   06/29/18 263 lb (119.3 kg)   03/01/18 255 lb (115.7 kg)   01/31/18 245 lb 14.4 oz (111.5 kg)                  Labs reviewed in EPIC    ROS:  Constitutional, HEENT, cardiovascular, pulmonary, gi and gu systems are negative, except as otherwise noted.    OBJECTIVE:     /78 (BP Location: Left arm, Patient Position: Chair, Cuff Size: Adult Regular)  Pulse 77  Temp 97.9  F (36.6  C) (Temporal)  Resp 16  Ht 5' 9\" (1.753 m)  Wt 263 lb (119.3 kg)  SpO2 96%  BMI 38.84 kg/m2  Body mass index is 38.84 kg/(m^2).   Physical Exam   Constitutional: He is oriented to person, place, and time. He appears well-developed and well-nourished.   HENT:   Head: Normocephalic and atraumatic.   Eyes: EOM are normal.   Cardiovascular: Normal rate, regular rhythm, normal heart sounds and intact distal pulses.  Exam reveals no gallop.    No murmur heard.  Pulmonary/Chest: Effort normal and breath sounds normal.   Neurological: He is alert and oriented to person, place, and time.   Psychiatric: He has a normal mood and affect. His behavior is normal. Judgment and thought content normal.         Diagnostic Test Results:  none     ASSESSMENT/PLAN:     Problem List Items Addressed This Visit     Type 2 diabetes, HbA1c goal < 7% (H) - Primary     Patient continues to work on his weights and has been compliant to his " medications.  Repeat A1c is at 5.7 indicating well-controlled blood sugars.  Refill metformin and glipizide as needed.  Add Lipitor to the regimen.  Continue aspirin and lisinopril at the current doses.  Recheck in 6 months.         Relevant Medications    glipiZIDE (GLUCOTROL XL) 5 MG 24 hr tablet    metFORMIN (GLUCOPHAGE) 500 MG tablet    atorvastatin (LIPITOR) 20 MG tablet    Other Relevant Orders    Hemoglobin A1c (Completed)    Morbid obesity (H)    Relevant Medications    glipiZIDE (GLUCOTROL XL) 5 MG 24 hr tablet    metFORMIN (GLUCOPHAGE) 500 MG tablet         Lawanda Barrera MD  [facility]

## 2018-06-27 DIAGNOSIS — E11.8 TYPE 2 DIABETES MELLITUS WITH COMPLICATION, WITHOUT LONG-TERM CURRENT USE OF INSULIN (H): ICD-10-CM

## 2018-06-27 NOTE — TELEPHONE ENCOUNTER
"Requested Prescriptions   Pending Prescriptions Disp Refills     ONETOUCH ULTRA test strip [Pharmacy Med Name: ONETOUCH ULTRA BLUE TEST STRP  STRIP] 100 strip      Sig: USE TO TEST BLOOD SUGARS 3 TIMES DAILY IN THE MORNING. REASON:UNCONTROLLED SUGARS WITH RECENT INFECTIONS    Diabetic Supplies Protocol Passed    6/27/2018  8:00 AM       Passed - Patient is 18 years of age or older       Passed - Recent (6 mo) or future (30 days) visit within the authorizing provider's specialty    Patient had office visit in the last 6 months or has a visit in the next 30 days with authorizing provider.  See \"Patient Info\" tab in inbasket, or \"Choose Columns\" in Meds & Orders section of the refill encounter.            Next 5 appointments (look out 90 days)     Jun 29, 2018  2:20 PM CDT   [facility] Visit with Lawanda Barrera MD   [facility]    [address] [place] [id number]   [phone]              Prescription approved per [facility] Refill Protocol.  Dannielle Harris, RN, BSN     "

## 2018-06-29 ENCOUNTER — OFFICE VISIT (OUTPATIENT)
Dept: FAMILY MEDICINE | Facility: OTHER | Age: 43
End: 2018-06-29
Payer: COMMERCIAL

## 2018-06-29 VITALS
SYSTOLIC BLOOD PRESSURE: 124 MMHG | BODY MASS INDEX: 38.95 KG/M2 | HEART RATE: 77 BPM | TEMPERATURE: 97.9 F | HEIGHT: 69 IN | DIASTOLIC BLOOD PRESSURE: 78 MMHG | OXYGEN SATURATION: 96 % | RESPIRATION RATE: 16 BRPM | WEIGHT: 263 LBS

## 2018-06-29 DIAGNOSIS — F17.200 TOBACCO USE DISORDER: ICD-10-CM

## 2018-06-29 DIAGNOSIS — E66.01 MORBID OBESITY (H): ICD-10-CM

## 2018-06-29 DIAGNOSIS — E11.8 TYPE 2 DIABETES MELLITUS WITH COMPLICATION, WITHOUT LONG-TERM CURRENT USE OF INSULIN (H): ICD-10-CM

## 2018-06-29 DIAGNOSIS — E11.9 TYPE 2 DIABETES MELLITUS WITHOUT COMPLICATION, WITHOUT LONG-TERM CURRENT USE OF INSULIN (H): Primary | ICD-10-CM

## 2018-06-29 DIAGNOSIS — E11.69 TYPE 2 DIABETES MELLITUS WITH OTHER SPECIFIED COMPLICATION, UNSPECIFIED LONG TERM INSULIN USE STATUS: ICD-10-CM

## 2018-06-29 LAB — HBA1C MFR BLD: 5.7 % (ref 0–5.6)

## 2018-06-29 PROCEDURE — 99213 OFFICE O/P EST LOW 20 MIN: CPT | Performed by: FAMILY MEDICINE

## 2018-06-29 PROCEDURE — 36415 COLL VENOUS BLD VENIPUNCTURE: CPT | Performed by: FAMILY MEDICINE

## 2018-06-29 PROCEDURE — 83036 HEMOGLOBIN GLYCOSYLATED A1C: CPT | Performed by: FAMILY MEDICINE

## 2018-06-29 RX ORDER — GLIPIZIDE 5 MG/1
5 TABLET, FILM COATED, EXTENDED RELEASE ORAL DAILY
Qty: 90 TABLET | Refills: 0 | Status: SHIPPED | OUTPATIENT
Start: 2018-06-29 | End: 2018-12-10

## 2018-06-29 RX ORDER — ATORVASTATIN CALCIUM 20 MG/1
20 TABLET, FILM COATED ORAL DAILY
Qty: 90 TABLET | Refills: 3 | Status: SHIPPED | OUTPATIENT
Start: 2018-06-29 | End: 2019-07-07

## 2018-06-29 ASSESSMENT — PAIN SCALES - GENERAL: PAINLEVEL: NO PAIN (0)

## 2018-06-29 NOTE — MR AVS SNAPSHOT
"              After Visit Summary   6/29/2018    Estevan Callejas    MRN: [id number]           Patient Information     Date Of Birth          1975        Visit Information        Provider Department      6/29/2018 2:20 PM Lawanda Barrera MD [facility]        Today's Diagnoses     Type 2 diabetes mellitus without complication, without long-term current use of insulin (H)    -  1    Type 2 diabetes mellitus with other specified complication, unspecified long term insulin use status (H)        Type 2 diabetes mellitus with complication, without long-term current use of insulin (H)        Morbid obesity (H)           Follow-ups after your visit        Follow-up notes from your care team     Return in about 6 months (around 12/29/2018).      Who to contact     If you have questions or need follow up information about today's clinic visit or your schedule please contact [facility] directly at [phone].  Normal or non-critical lab and imaging results will be communicated to you by MyChart, letter or phone within 4 business days after the clinic has received the results. If you do not hear from us within 7 days, please contact the clinic through MyChart or phone. If you have a critical or abnormal lab result, we will notify you by phone as soon as possible.  Submit refill requests through [employer] or call your pharmacy and they will forward the refill request to us. Please allow 3 business days for your refill to be completed.          Additional Information About Your Visit        Care EveryWhere ID     This is your Care EveryWhere ID. This could be used by other organizations to access your [facility] medical records  [id number]        Your Vitals Were     Pulse Temperature Respirations Height Pulse Oximetry BMI (Body Mass Index)    77 97.9  F (36.6  C) (Temporal) 16 5' 9\" (1.753 m) 96% 38.84 kg/m2       Blood Pressure from Last 3 Encounters:   06/29/18 124/78   03/01/18 126/84 "   01/31/18 124/83    Weight from Last 3 Encounters:   06/29/18 263 lb (119.3 kg)   03/01/18 255 lb (115.7 kg)   01/31/18 245 lb 14.4 oz (111.5 kg)              We Performed the Following     Hemoglobin A1c          Today's Medication Changes          These changes are accurate as of 6/29/18  2:47 PM.  If you have any questions, ask your nurse or doctor.               Start taking these medicines.        Dose/Directions    atorvastatin 20 MG tablet   Commonly known as:  LIPITOR   Used for:  Type 2 diabetes mellitus with complication, without long-term current use of insulin (H)   Started by:  Lawanda Barrera MD        Dose:  20 mg   Take 1 tablet (20 mg) by mouth daily   Quantity:  90 tablet   Refills:  3         These medicines have changed or have updated prescriptions.        Dose/Directions    metFORMIN 500 MG tablet   Commonly known as:  GLUCOPHAGE   This may have changed:  See the new instructions.   Used for:  Type 2 diabetes mellitus with complication, without long-term current use of insulin (H)   Changed by:  Lawanda Barrera MD        Dose:  1000 mg   Take 2 tablets (1,000 mg) by mouth daily (with breakfast)   Quantity:  180 tablet   Refills:  1         Stop taking these medicines if you haven't already. Please contact your care team if you have questions.     oxyCODONE IR 5 MG tablet   Commonly known as:  ROXICODONE   Stopped by:  Lawanda Barrera MD                Where to get your medicines      These medications were sent to [place], [place] - [facility] [address]  [address] [facility] [address]     Phone:  [phone]     atorvastatin 20 MG tablet    glipiZIDE 5 MG 24 hr tablet    metFORMIN 500 MG tablet                Primary Care Provider Office Phone # Fax #    Lawanda Barrera [phone]       [address] [facility] [address]  [facility] [address]        Equal Access to Services     DAVID JORGE AH: yane Fonseca qaybta  elisabeth lopezsarah isabel ah. So [facility] [phone].    ATENCIÓN: Si stormy cat, tiene a parker disposición servicios gratuitos de asistencia lingüística. Brandyn al [phone].    We comply with applicable federal civil rights laws and [place] laws. We do not discriminate on the basis of race, color, national origin, age, disability, sex, sexual orientation, or gender identity.            Thank you!     Thank you for choosing [facility]  for your care. Our goal is always to provide you with excellent care. Hearing back from our patients is one way we can continue to improve our services. Please take a few minutes to complete the written survey that you may receive in the mail after your visit with us. Thank you!             Your Updated Medication List - Protect others around you: Learn how to safely use, store and throw away your medicines at www.disposemymeds.org.          This list is accurate as of 6/29/18  2:47 PM.  Always use your most recent med list.                   Brand Name Dispense Instructions for use Diagnosis    acetaminophen 325 MG tablet    TYLENOL    100 tablet    Take 2 tablets (650 mg) by mouth every 4 hours as needed for other (multimodal surgical pain management along with NSAIDS and opioid medication as indicated based on pain control and physical function.)    Perirectal abscess       aspirin 81 MG tablet     90 tablet    Take 1 tablet (81 mg) by mouth daily    Type 2 diabetes mellitus without complication, without long-term current use of insulin (H)       atorvastatin 20 MG tablet    LIPITOR    90 tablet    Take 1 tablet (20 mg) by mouth daily    Type 2 diabetes mellitus with complication, without long-term current use of insulin (H)       blood glucose lancets standard    no brand specified    100 each    Use to test blood sugar one times daily or as directed.    Type 2 diabetes mellitus with complication, without long-term current use of  "insulin (H)       blood glucose monitoring meter device kit    no brand specified    1 kit    Use to test blood sugar one times daily or as directed.    Type 2 diabetes mellitus with complication, without long-term current use of insulin (H)       Cotton Swabs Swab     100 each    1 each as needed    Type 2 diabetes mellitus without complication, without long-term current use of insulin (H)       gabapentin 300 MG capsule    NEURONTIN    270 capsule    TAKE ONE CAPSULE BY MOUTH THREE TIMES DAILY    Type 2 diabetes mellitus without complication, without long-term current use of insulin (H)       gauze pads & dressings 4\"X4\" Pads     1 each    100 each as needed    Type 2 diabetes mellitus without complication, without long-term current use of insulin (H)       glipiZIDE 5 MG 24 hr tablet    GLUCOTROL XL    90 tablet    Take 1 tablet (5 mg) by mouth daily    Type 2 diabetes mellitus with other specified complication, unspecified long term insulin use status (H)       lisinopril 2.5 MG tablet    PRINIVIL/Zestril    90 tablet    Take 1 tablet (2.5 mg) by mouth daily    Type 2 diabetes mellitus without complication, without long-term current use of insulin (H)       metFORMIN 500 MG tablet    GLUCOPHAGE    180 tablet    Take 2 tablets (1,000 mg) by mouth daily (with breakfast)    Type 2 diabetes mellitus with complication, without long-term current use of insulin (H)       ONETOUCH ULTRA test strip   Generic drug:  blood glucose monitoring     100 strip    USE TO TEST BLOOD SUGARS 3 TIMES DAILY IN THE MORNING. REASON:UNCONTROLLED SUGARS WITH RECENT INFECTIONS    Type 2 diabetes mellitus with complication, without long-term current use of insulin (H)       polyethylene glycol Packet    MIRALAX/GLYCOLAX    30 packet    Take 17 g by mouth daily as needed for constipation    Drug-induced constipation         "

## 2018-07-01 PROBLEM — F17.200 TOBACCO USE DISORDER: Status: ACTIVE | Noted: 2018-07-01

## 2018-07-01 NOTE — ASSESSMENT & PLAN NOTE
Patient continues to work on his weights and has been compliant to his medications.  Repeat A1c is at 5.7 indicating well-controlled blood sugars.  Refill metformin and glipizide as needed.  Add Lipitor to the regimen.  Continue aspirin and lisinopril at the current doses.  Recheck in 6 months.

## 2018-08-07 DIAGNOSIS — E11.9 TYPE 2 DIABETES MELLITUS WITHOUT COMPLICATION, WITHOUT LONG-TERM CURRENT USE OF INSULIN (H): ICD-10-CM

## 2018-08-07 RX ORDER — LISINOPRIL 2.5 MG/1
TABLET ORAL
Qty: 90 TABLET | Refills: 3 | Status: SHIPPED | OUTPATIENT
Start: 2018-08-07 | End: 2019-02-11

## 2018-08-07 NOTE — TELEPHONE ENCOUNTER
"Requested Prescriptions   Pending Prescriptions Disp Refills     lisinopril (PRINIVIL/ZESTRIL) 2.5 MG tablet [Pharmacy Med Name: LISINOPRIL 2.5 MG TABLET] 90 tablet      Sig: Take 1 tablet (2.5 mg) by mouth daily    ACE Inhibitors (Including Combos) Protocol Passed    8/7/2018  9:02 AM       Passed - Blood pressure under 140/90 in past 12 months    BP Readings from Last 3 Encounters:   06/29/18 124/78   03/01/18 126/84   01/31/18 124/83                Passed - Recent (12 mo) or future (30 days) visit within the authorizing provider's specialty    Patient had office visit in the last 12 months or has a visit in the next 30 days with authorizing provider or within the authorizing provider's specialty.  See \"Patient Info\" tab in inbasket, or \"Choose Columns\" in Meds & Orders section of the refill encounter.           Passed - Patient is age 18 or older       Passed - Normal serum creatinine on file in past 12 months    Recent Labs   Lab Test  01/30/18   1456   CR  0.67            Passed - Normal serum potassium on file in past 12 months    Recent Labs   Lab Test  01/30/18   1456   POTASSIUM  4.1               Last OV:  06/29/2018    Prescription approved per [facility] Refill Protocol.    Levi Dang, RN, BSN    "

## 2018-08-30 DIAGNOSIS — E11.9 TYPE 2 DIABETES, HBA1C GOAL < 7% (H): Primary | ICD-10-CM

## 2018-08-30 RX ORDER — LANCETS 33 GAUGE
EACH MISCELLANEOUS
Qty: 100 EACH | Refills: 3 | Status: SHIPPED | OUTPATIENT
Start: 2018-08-30 | End: 2019-03-22

## 2018-08-30 NOTE — TELEPHONE ENCOUNTER
"Requested Prescriptions   Pending Prescriptions Disp Refills     ONETOUCH DELICA LANCETS 33G MISC [Pharmacy Med Name: ONETOUCH DELICA 33 GAUGE]       Sig: USE TO TEST BLOOD SUGARS ONE TIME DAILY OR AS DIRECTED    Diabetic Supplies Protocol Passed    8/30/2018  8:00 AM       Passed - Patient is 18 years of age or older       Passed - Recent (6 mo) or future (30 days) visit within the authorizing provider's specialty    Patient had office visit in the last 6 months or has a visit in the next 30 days with authorizing provider.  See \"Patient Info\" tab in inbasket, or \"Choose Columns\" in Meds & Orders section of the refill encounter.            Prescription approved per [facility] Refill Protocol.    Dannielle Harris RN, BSN     "

## 2018-10-18 NOTE — TELEPHONE ENCOUNTER
Daily Note     Today's date: 10/18/2018  Patient name: Lyn Davila  : 1954  MRN: [id number]  Referring provider: Ilda Watson MD  Dx:   Encounter Diagnosis     ICD-10-CM    1  Complete rotator cuff tear or rupture of right shoulder, not specified as traumatic M75 121                   Subjective: Pt reports 1/10 pain         Objective: See treatment diary below    Precautions: - Hypertension , Stent in 2017, Diabetes , GERD , L-S discectomy 15 years ago,   Surgery on 19        Specialty Daily Treatment Diary      Manual  10/11/18 10/18/18      STM Right UT, levator, rhomboids  6 min 6 min      PROM right shld as per protocol  6 min - began PROM ABD 6 min       ST joint mobs                                               Exercise Diary  10/11 10/18      Pulleys  20x 30      Bent row  20x 30 x 1#      Elbow flex/ext  30x 30 x 1#      Pendulums  2 min x 2 2 min x 1#      Scap retraction  30x 30       Cane flex  20x 20       Cane ER  20x 20       Isomets  20x 20                                                                                                                                                                              Modalities 10/11 10/18      MH - pre  10 min 5 min      CP - post  5 min 10 min      Visit # 6 7              Assessment:  ROM normalizing well  States that he is sleeping better with less pain    Plan: Continue per plan of care  Progress treament per protocol    Begin week 8 Reason for follow up call: Estevan Callejas appeared on our list for being seen in and recenlty discharge from the Hospital.    Chief Complaint   Patient presents with     Hospital F/U     Abscess, Drug-Induced Constipation       Encounter routed for Clinic RN to call for follow up

## 2018-10-29 ENCOUNTER — TELEPHONE (OUTPATIENT)
Dept: WOUND CARE | Facility: CLINIC | Age: 43
End: 2018-10-29

## 2018-10-29 ENCOUNTER — OFFICE VISIT (OUTPATIENT)
Dept: SURGERY | Facility: CLINIC | Age: 43
End: 2018-10-29
Payer: COMMERCIAL

## 2018-10-29 VITALS — BODY MASS INDEX: 40.58 KG/M2 | OXYGEN SATURATION: 97 % | WEIGHT: 274 LBS | HEIGHT: 69 IN | HEART RATE: 79 BPM

## 2018-10-29 DIAGNOSIS — S31.809D WOUND OF BUTTOCK, UNSPECIFIED LATERALITY, SUBSEQUENT ENCOUNTER: Primary | ICD-10-CM

## 2018-10-29 ASSESSMENT — PAIN SCALES - GENERAL: PAINLEVEL: NO PAIN (0)

## 2018-10-29 NOTE — TELEPHONE ENCOUNTER
Pt called. He has a hx of perianal wound after I+D wit Dr Munroe in January this year. I took care of the wound vac that was placed and later dressing changes. He just called and stated he has a painful lump in this area. Interestingly he continued to have some drainage in the area that stopped recently. Will inform the GS team. Patrica Johnson RN

## 2018-10-29 NOTE — LETTER
10/29/2018       RE: Estevan Callejas  [address] [facility] [facility] [address]     Dear Colleague,    Thank you for referring your patient, Estevan Callejas, to the [facility] GENERAL SURGERY at [facility]. Please see a copy of my visit note below.    Patient seen for f/u.  Has history of peineal wound with extensive I+D about a year ago.  Has been slowly healing and finally stopped draining only a week ago.  Now has had some disconfort in area for a day or so with some tenderness and area of firmness.  No fevers/chills.  He is understandably worried about this in light of his history.  On exam, his perineum is without signs of active infection.  There is some mildly macerated skin and moisture in area.  In his perineum there is a crevace with no open wound and this was examined and probed with q tip with no signs of drainage or areas of tenderness.    A/p - healed wound.  Recommended keeping area dry by placing 4x4 to absorb moisture.  Should still be vigilant as there certainly can develop an abscess so if he starts to drain again or if his symptoms worsen in any way, he is strongly encouraged to get in contact with us.  He is understanding of discussion and agreeable to plan.      Inder Antoine MD

## 2018-10-29 NOTE — NURSING NOTE
"Chief Complaint   Patient presents with     Consult     Right Buttock Mass       Vitals:    10/29/18 1518   Pulse: 79   SpO2: 97%   Weight: 274 lb   Height: 5' 9\"       Body mass index is 40.46 kg/(m^2).      Tom Diaz on 10/29/2018 at 3:27 PM                          "

## 2018-10-29 NOTE — PATIENT INSTRUCTIONS
You met with Dr. Inder Antoine.      Today's visit instructions:    Our office has left a message with Ludivina, financial counselor, and asked that she call you to discuss your financial concerns.    Please contact the office as needed.        If you have questions please contact Almas RN or Cely RN during regular clinic hours, Monday through Friday 7:30 AM - 4:00 PM, or you can contact us via [employer] at anytime.       If you have urgent needs after-hours, weekends, or holidays please call the hospital at [phone] and ask to speak with our on-call General Surgery Team.    Appointment [phone], option #1   Nurse Advice (Almas or Cely): [phone]   Surgery Scheduler (Sofya): [phone]  Fax: [phone]

## 2018-10-29 NOTE — TELEPHONE ENCOUNTER
Estevan Callejas is a patient that was under the care of the General Surgery team earlier in the year for a buttock wound and underwent I&D with VAC placement.    The patient is calling the office at this time with concerns regarding possible recurrence of the right buttock mass. He states that he was running errands over the weekend and noted soreness and a lump in the area.  He denies drainage or fever.      Appointment arranged today in the clinic for evaluation.

## 2018-11-01 NOTE — PROGRESS NOTES
Patient seen for f/u.  Has history of peineal wound with extensive I+D about a year ago.  Has been slowly healing and finally stopped draining only a week ago.  Now has had some disconfort in area for a day or so with some tenderness and area of firmness.  No fevers/chills.  He is understandably worried about this in light of his history.  On exam, his perineum is without signs of active infection.  There is some mildly macerated skin and moisture in area.  In his perineum there is a crevace with no open wound and this was examined and probed with q tip with no signs of drainage or areas of tenderness.    A/p - healed wound.  Recommended keeping area dry by placing 4x4 to absorb moisture.  Should still be vigilant as there certainly can develop an abscess so if he starts to drain again or if his symptoms worsen in any way, he is strongly encouraged to get in contact with us.  He is understanding of discussion and agreeable to plan.

## 2018-11-05 ENCOUNTER — APPOINTMENT (OUTPATIENT)
Dept: CT IMAGING | Facility: CLINIC | Age: 43
End: 2018-11-05
Attending: EMERGENCY MEDICINE
Payer: COMMERCIAL

## 2018-11-05 ENCOUNTER — HOSPITAL ENCOUNTER (EMERGENCY)
Facility: CLINIC | Age: 43
Discharge: HOME | End: 2018-11-05
Attending: EMERGENCY MEDICINE | Admitting: EMERGENCY MEDICINE
Payer: COMMERCIAL

## 2018-11-05 ENCOUNTER — PATIENT OUTREACH (OUTPATIENT)
Dept: SURGERY | Facility: CLINIC | Age: 43
End: 2018-11-05

## 2018-11-05 VITALS
RESPIRATION RATE: 16 BRPM | WEIGHT: 273 LBS | HEART RATE: 86 BPM | SYSTOLIC BLOOD PRESSURE: 134 MMHG | BODY MASS INDEX: 40.32 KG/M2 | OXYGEN SATURATION: 99 % | TEMPERATURE: 98.4 F | DIASTOLIC BLOOD PRESSURE: 83 MMHG

## 2018-11-05 DIAGNOSIS — T81.31XA POSTOPERATIVE WOUND BREAKDOWN, INITIAL ENCOUNTER: ICD-10-CM

## 2018-11-05 LAB
ANION GAP SERPL CALCULATED.3IONS-SCNC: 6 MMOL/L (ref 3–14)
BASOPHILS # BLD AUTO: 0 10E9/L (ref 0–0.2)
BASOPHILS NFR BLD AUTO: 0.3 %
BUN SERPL-MCNC: 10 MG/DL (ref 7–30)
CALCIUM SERPL-MCNC: 8.8 MG/DL (ref 8.5–10.1)
CHLORIDE SERPL-SCNC: 104 MMOL/L (ref 94–109)
CO2 SERPL-SCNC: 26 MMOL/L (ref 20–32)
CREAT BLD-MCNC: 0.6 MG/DL (ref 0.66–1.25)
CREAT SERPL-MCNC: 0.58 MG/DL (ref 0.66–1.25)
DIFFERENTIAL METHOD BLD: ABNORMAL
EOSINOPHIL # BLD AUTO: 0.2 10E9/L (ref 0–0.7)
EOSINOPHIL NFR BLD AUTO: 1.8 %
ERYTHROCYTE [DISTWIDTH] IN BLOOD BY AUTOMATED COUNT: 12.2 % (ref 10–15)
GFR SERPL CREATININE-BSD FRML MDRD: >90 ML/MIN/1.7M2
GFR SERPL CREATININE-BSD FRML MDRD: >90 ML/MIN/1.7M2
GLUCOSE BLDC GLUCOMTR-MCNC: 154 MG/DL (ref 70–99)
GLUCOSE SERPL-MCNC: 136 MG/DL (ref 70–99)
HCT VFR BLD AUTO: 44.9 % (ref 40–53)
HGB BLD-MCNC: 14.8 G/DL (ref 13.3–17.7)
IMM GRANULOCYTES # BLD: 0.1 10E9/L (ref 0–0.4)
IMM GRANULOCYTES NFR BLD: 0.5 %
LYMPHOCYTES # BLD AUTO: 2.6 10E9/L (ref 0.8–5.3)
LYMPHOCYTES NFR BLD AUTO: 22.5 %
MCH RBC QN AUTO: 31.9 PG (ref 26.5–33)
MCHC RBC AUTO-ENTMCNC: 33 G/DL (ref 31.5–36.5)
MCV RBC AUTO: 97 FL (ref 78–100)
MONOCYTES # BLD AUTO: 0.9 10E9/L (ref 0–1.3)
MONOCYTES NFR BLD AUTO: 7.7 %
NEUTROPHILS # BLD AUTO: 7.6 10E9/L (ref 1.6–8.3)
NEUTROPHILS NFR BLD AUTO: 67.2 %
NRBC # BLD AUTO: 0 10*3/UL
NRBC BLD AUTO-RTO: 0 /100
PLATELET # BLD AUTO: 195 10E9/L (ref 150–450)
POTASSIUM SERPL-SCNC: 4 MMOL/L (ref 3.4–5.3)
RBC # BLD AUTO: 4.64 10E12/L (ref 4.4–5.9)
SODIUM SERPL-SCNC: 136 MMOL/L (ref 133–144)
WBC # BLD AUTO: 11.4 10E9/L (ref 4–11)

## 2018-11-05 PROCEDURE — 25000128 H RX IP 250 OP 636: Performed by: RADIOLOGY

## 2018-11-05 PROCEDURE — 96360 HYDRATION IV INFUSION INIT: CPT | Performed by: EMERGENCY MEDICINE

## 2018-11-05 PROCEDURE — 82565 ASSAY OF CREATININE: CPT

## 2018-11-05 PROCEDURE — 99284 EMERGENCY DEPT VISIT MOD MDM: CPT | Mod: Z6 | Performed by: EMERGENCY MEDICINE

## 2018-11-05 PROCEDURE — 85025 COMPLETE CBC W/AUTO DIFF WBC: CPT | Performed by: EMERGENCY MEDICINE

## 2018-11-05 PROCEDURE — 25000128 H RX IP 250 OP 636: Performed by: EMERGENCY MEDICINE

## 2018-11-05 PROCEDURE — 80048 BASIC METABOLIC PNL TOTAL CA: CPT | Performed by: EMERGENCY MEDICINE

## 2018-11-05 PROCEDURE — 99285 EMERGENCY DEPT VISIT HI MDM: CPT | Mod: 25 | Performed by: EMERGENCY MEDICINE

## 2018-11-05 PROCEDURE — 00000146 ZZHCL STATISTIC GLUCOSE BY METER IP

## 2018-11-05 PROCEDURE — 74177 CT ABD & PELVIS W/CONTRAST: CPT

## 2018-11-05 RX ORDER — IOPAMIDOL 755 MG/ML
135 INJECTION, SOLUTION INTRAVASCULAR ONCE
Status: COMPLETED | OUTPATIENT
Start: 2018-11-05 | End: 2018-11-05

## 2018-11-05 RX ADMIN — IOPAMIDOL 135 ML: 755 INJECTION, SOLUTION INTRAVENOUS at 13:23

## 2018-11-05 RX ADMIN — SODIUM CHLORIDE 1000 ML: 9 INJECTION, SOLUTION INTRAVENOUS at 12:19

## 2018-11-05 NOTE — LETTER
November 5, 2018      To Whom It May Concern:      Estevan Callejas was seen in our Emergency Department today, 11/05/18.  I expect his condition to improve over the next week.  He may return to work/school on 11/6/18.    Sincerely,        Angelito Echeverria MD, MD

## 2018-11-05 NOTE — DISCHARGE INSTRUCTIONS
Follow treatment recommendations as per general surgery.    Please follow up with Surgery (General) (phone: [phone]) on Friday as instructed.    Return to the ER for problems or fevers.

## 2018-11-05 NOTE — PROGRESS NOTES
Spoke with Dr. Antoine regarding patients concerns. He states that there isn't much that can be done for patient in a clinic setting. If patient is having a significant amount of drainage that he should go to the ED for evaluation- for labs and a possible CT. Called and spoke with patient regarding this. He states he left the message for Patrica yesterday. Since then he has continue to seep fluid- clear and bloody in color. He changed 4 pads yesterday afternoon and is on his 3rd pad this morning. He states there is a firm and tender lump to the area. Denies fevers/chills. He said the pain was relieved once the area started draining. Discussed the options of continuing to monitor the area or to go to the ED for evaluation. He states he would prefer to get this taken care of sooner than later. He will plan on going to the ED for evaluation. Informed Dr. Antoine regarding patients plan.        Patrica Johnson RN Finnigsmier, Andrea, RN Hi Andrea,   Pt saw TH last week but just left me a voicemail stating the had a lot of blood from the rectal area.   Will you be able to bryn him?     Thank you   Patrica

## 2018-11-05 NOTE — ED TRIAGE NOTES
Arrived to ED d/t wound check, had a gangrenous area to perianal area, was whipping after using toilet and noticed a lot of bloody drainage from area, has been having drainage since yesterday, VSS, afebrile

## 2018-11-05 NOTE — ED AVS SNAPSHOT
[facility], [place], Emergency Department    [address] [place] [address]    Phone:  [phone]                                       Estevan Callejas   MRN: [id number]    Department:  [facility], Emergency Department   Date of Visit:  11/5/2018           After Visit Summary Signature Page     I have received my discharge instructions, and my questions have been answered. I have discussed any challenges I see with this plan with the nurse or doctor.    ..........................................................................................................................................  Patient/Patient Representative Signature      ..........................................................................................................................................  Patient Representative Print Name and Relationship to Patient    ..................................................               ................................................  Date                                   Time    ..........................................................................................................................................  Reviewed by Signature/Title    ...................................................              ..............................................  Date                                               Time          22EPIC Rev 08/18

## 2018-11-05 NOTE — ED AVS SNAPSHOT
[facility], Emergency Department    [address] [address]    Phone:  [phone]                                       Estevan Callejas   MRN: [id number]    Department:  [facility], Emergency Department   Date of Visit:  11/5/2018           Patient Information     Date Of Birth          1975        Your diagnoses for this visit were:     Postoperative wound breakdown, initial encounter perineum       You were seen by Angelito Echeverria MD.        Discharge Instructions       Follow treatment recommendations as per general surgery.    Please follow up with Surgery (General) (phone: [phone]) on Friday as instructed.    Return to the ER for problems or fevers.        24 Hour Appointment Hotline       To make an appointment at any [facility] clinic, call [phone] ([phone]). If you don't have a family doctor or clinic, we will help you find one. [facility] clinics are conveniently located to serve the needs of you and your family.             Review of your medicines      Our records show that you are taking the medicines listed below. If these are incorrect, please call your family doctor or clinic.        Dose / Directions Last dose taken    acetaminophen 325 MG tablet   Commonly known as:  TYLENOL   Dose:  650 mg   Quantity:  100 tablet        Take 2 tablets (650 mg) by mouth every 4 hours as needed for other (multimodal surgical pain management along with NSAIDS and opioid medication as indicated based on pain control and physical function.)   Refills:  0        aspirin 81 MG tablet   Dose:  81 mg   Quantity:  90 tablet        Take 1 tablet (81 mg) by mouth daily   Refills:  3        atorvastatin 20 MG tablet   Commonly known as:  LIPITOR   Dose:  20 mg   Quantity:  90 tablet        Take 1 tablet (20 mg) by mouth daily   Refills:  3        blood glucose lancets standard   Commonly known as:  no brand specified   Quantity:  100 each        Use to test blood sugar one  "times daily or as directed.   Refills:  3        blood glucose monitoring meter device kit   Commonly known as:  no brand specified   Quantity:  1 kit        Use to test blood sugar one times daily or as directed.   Refills:  0        Cotton Swabs Swab   Dose:  1 each   Quantity:  100 each        1 each as needed   Refills:  3        gabapentin 300 MG capsule   Commonly known as:  NEURONTIN   Quantity:  270 capsule        TAKE ONE CAPSULE BY MOUTH THREE TIMES DAILY   Refills:  1        gauze pads & dressings 4\"X4\" Pads   Dose:  100 each   Quantity:  1 each        100 each as needed   Refills:  3        glipiZIDE 5 MG 24 hr tablet   Commonly known as:  GLUCOTROL XL   Dose:  5 mg   Quantity:  90 tablet        Take 1 tablet (5 mg) by mouth daily   Refills:  0        LAXATIVE PO        Refills:  0        lisinopril 2.5 MG tablet   Commonly known as:  PRINIVIL/Zestril   Quantity:  90 tablet        Take 1 tablet (2.5 mg) by mouth daily   Refills:  3        metFORMIN 500 MG tablet   Commonly known as:  GLUCOPHAGE   Dose:  1000 mg   Quantity:  180 tablet        Take 2 tablets (1,000 mg) by mouth daily (with breakfast)   Refills:  1        ONETOUCH DELICA LANCETS 33G Misc   Quantity:  100 each        USE TO TEST BLOOD SUGARS ONE TIME DAILY OR AS DIRECTED   Refills:  3        ONETOUCH ULTRA test strip   Quantity:  100 strip   Generic drug:  blood glucose monitoring        USE TO TEST BLOOD SUGARS 3 TIMES DAILY IN THE MORNING. REASON:UNCONTROLLED SUGARS WITH RECENT INFECTIONS   Refills:  3        polyethylene glycol Packet   Commonly known as:  MIRALAX/GLYCOLAX   Dose:  17 g   Quantity:  30 packet        Take 17 g by mouth daily as needed for constipation   Refills:  1                Procedures and tests performed during your visit     Basic metabolic panel    CBC with platelets differential    CT Abdomen Pelvis w Contrast    Creatinine POCT    Glucose by meter    Glucose monitor nursing POCT    ISTAT creatinine nursing POCT "    Peripheral IV catheter    Vital signs      Orders Needing Specimen Collection     None      Pending Results     No orders found from 11/3/2018 to 11/6/2018.            Pending Culture Results     No orders found from 11/3/2018 to 11/6/2018.            Pending Results Instructions     If you had any lab results that were not finalized at the time of your Discharge, you can call the ED Lab Result RN at [phone]. You will be contacted by this team for any positive Lab results or changes in treatment. The nurses are available 7 days a week from 10A to 6:30P.  You can leave a message 24 hours per day and they will return your call.        Thank you for choosing [facility]       Thank you for choosing [facility] for your care. Our goal is always to provide you with excellent care. Hearing back from our patients is one way we can continue to improve our services. Please take a few minutes to complete the written survey that you may receive in the mail after you visit with us. Thank you!        Care EveryWhere ID     This is your Care EveryWhere ID. This could be used by other organizations to access your [facility] medical records  [id number]        Equal Access to Services     DAVID JORGE : Hadii jonas Acuna, wabel mcguire, qaybmaria r kaalhanh fernández, elisabeth isabel . So [facility] [phone].    ATENCIÓN: Si habla español, tiene a parker disposición servicios gratuitos de asistencia lingüística. Llame al [phone].    We comply with applicable federal civil rights laws and [place] laws. We do not discriminate on the basis of race, color, national origin, age, disability, sex, sexual orientation, or gender identity.            After Visit Summary       This is your record. Keep this with you and show to your community pharmacist(s) and doctor(s) at your next visit.

## 2018-11-05 NOTE — ED NOTES
Patient's blood pressure decreased during patient's stay in the ED and was appropriate at the time of discharge.

## 2018-11-05 NOTE — ED PROVIDER NOTES
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  Patient has been recovering well but in the past week has noted some fullness in the surgical incision site in the crease in his perineum.  Patient states in the last 24 hours it has broken open and started to bleed.  Patient states he was seen in the surgery clinic last week and was told to follow-up this week.  Because of the bleeding he came to the ER for evaluation.  Patient denies any fevers.    I have reviewed the Medications, Allergies, Past Medical and Surgical History, and Social History in the Epic system.      Past Medical History:   Diagnosis Date     Diabetes (H)      Previous Medications    ACETAMINOPHEN (TYLENOL) 325 MG TABLET    Take 2 tablets (650 mg) by mouth every 4 hours as needed for other (multimodal surgical pain management along with NSAIDS and opioid medication as indicated based on pain control and physical function.)    APPLICATORS (COTTON SWABS) SWAB    1 each as needed    ASPIRIN 81 MG TABLET    Take 1 tablet (81 mg) by mouth daily    ATORVASTATIN (LIPITOR) 20 MG TABLET    Take 1 tablet (20 mg) by mouth daily    BISACODYL (LAXATIVE PO)        BLOOD GLUCOSE (NO BRAND SPECIFIED) LANCETS STANDARD    Use to test blood sugar one times daily or as directed.    BLOOD GLUCOSE MONITORING (NO BRAND SPECIFIED) METER DEVICE KIT    Use to test blood sugar one times daily or as directed.    GABAPENTIN (NEURONTIN) 300 MG CAPSULE    TAKE ONE CAPSULE BY MOUTH THREE TIMES DAILY    GAUZE PADS & DRESSINGS 4\"X4\" PADS    100 each as needed    GLIPIZIDE (GLUCOTROL XL) 5 MG 24 HR TABLET    Take 1 tablet (5 mg) by mouth daily    LISINOPRIL (PRINIVIL/ZESTRIL) 2.5 MG TABLET    Take 1 tablet (2.5 mg) by mouth daily    METFORMIN (GLUCOPHAGE) 500 MG TABLET    Take 2 tablets (1,000 mg) by mouth daily (with breakfast)    " ONETOUCH DELICA LANCETS 33G MISC    USE TO TEST BLOOD SUGARS ONE TIME DAILY OR AS DIRECTED    ONETOUCH ULTRA TEST STRIP    USE TO TEST BLOOD SUGARS 3 TIMES DAILY IN THE MORNING. REASON:UNCONTROLLED SUGARS WITH RECENT INFECTIONS    POLYETHYLENE GLYCOL (MIRALAX/GLYCOLAX) PACKET    Take 17 g by mouth daily as needed for constipation     Allergies   Allergen Reactions     Seasonal Allergies      Sneezing, itchy eyes     Social History     Social History     Marital status: Single     Spouse name: N/A     Number of children: N/A     Years of education: N/A     Occupational History     Not on file.     Social History Main Topics     Smoking status: Current Some Day Smoker     Types: Dip, chew, snus or snuff, Cigarettes     Smokeless tobacco: Current User     Alcohol use Yes     Drug use: No     Sexual activity: Yes     Partners: Female     Birth control/ protection: None     Other Topics Concern     Not on file     Social History Narrative     Past Surgical History:   Procedure Laterality Date     EXAM UNDER ANESTHESIA, CHANGE DRESSING (LOCATION), COMBINED N/A 1/15/2018    Procedure: COMBINED EXAM UNDER ANESTHESIA, CHANGE DRESSING (LOCATION);  Dressing Change and exam under Monitored Anesthesia Care with wound vac placement;  Surgeon: Stefania Munroe MD;  Location: [facility] OR     EXAM UNDER ANESTHESIA, CHANGE DRESSING (LOCATION), COMBINED N/A 1/18/2018    Procedure: COMBINED EXAM UNDER ANESTHESIA, CHANGE DRESSING (LOCATION);  Serial wound vac change perineal;  Surgeon: Stefania Munroe MD;  Location: [facility] OR     IRRIGATION AND DEBRIDEMENT PERINEAL, COMBINED N/A 1/13/2018    Procedure: COMBINED IRRIGATION AND DEBRIDEMENT PERINEAL;  Irrigation and Debridement of perianal abscess;  Surgeon: Stefania Munroe MD;  Location: [facility] OR     IRRIGATION AND DEBRIDEMENT PERINEAL, COMBINED N/A 1/14/2018    Procedure: COMBINED IRRIGATION AND DEBRIDEMENT PERINEAL;  combined irrigation and debridement  perineal and dressing change;  Surgeon: Stefania Munroe MD;  Location: [facility] OR     ORTHOPEDIC SURGERY       TONSILLECTOMY, ADENOIDECTOMY, COMBINED       No family history on file.    Review of Systems   All other systems reviewed and are negative.      Physical Exam   BP: (!) 191/98  Pulse: 86  Temp: 98.4  F (36.9  C)  Resp: 16  Weight: 123.8 kg (273 lb)  SpO2: 97 %      Physical Exam   Constitutional: He is oriented to person, place, and time.   Alert conversant and ambulatory without difficulty   HENT:   Head: Atraumatic.   Eyes: EOM are normal. Pupils are equal, round, and reactive to light.   Neck: Neck supple.   Pulmonary/Chest: Breath sounds normal.   Abdominal: Soft.   Musculoskeletal: He exhibits no deformity.   Neurological: He is alert and oriented to person, place, and time.   Grossly intact and symmetric   Skin:   Patient does have a surgical incision that is well-healed of the perineum and adjacent to this is an opening that has some dried blood.  There is also a fullness in the surgical margin as well.  There is no skin redness or obvious cellulitis   Psychiatric: He has a normal mood and affect.       ED Course     ED Course     Procedures         Case was discussed with general surgery and they recommended CT scan with labs.    Results for orders placed or performed during the hospital encounter of 11/05/18   CT Abdomen Pelvis w Contrast    Narrative    EXAMINATION: CT ABDOMEN PELVIS W CONTRAST, 11/5/2018 1:30 PM.    TECHNIQUE: Helical CT images from the lung bases through the symphysis  pubis were obtained with IV contrast. Contrast dose: 135cc Isovue 370.    COMPARISON: CT 1/13/2018    HISTORY: Perineal wound s/p carson's gangrene surgery in January -  R/o abscess.    FINDINGS:    Abdomen and pelvis:   Diffusely hypoattenuating hepatic parenchyma. Scattered subcentimeter  hepatic hypodensities are too small to characterize. The gallbladder,  spleen, adrenal glands, pancreas, and  kidneys appear normal. No  intra-abdominal free air or free fluid. No dilated loops of bowel. The  appendix is normal. Mild colonic diverticulosis. Normal caliber  abdominal aorta. The major abdominal vasculature is patent. No  lymphadenopathy in the abdomen or pelvis.    Lower chest:   The heart is nonenlarged. No pericardial effusion. Calcified  mediastinal and right hilar lymph nodes. Right lower lobe calcified  granulomas. The lung bases are clear.    Bones and soft tissues:   Mild fat stranding in the subcutaneous fat in the posterior  perineum/buttocks, right greater than left. Single tiny punctate focus  of gas at midline (series 3, image 271). Along the right paramedian  perineum/buttock, there is a 3.1 x 1.7 cm density (series 3, image  273).    Old fracture of the lateral right seventh through 11th ribs and eighth  through 11th ribs. Multilevel degenerative changes in the spine. No  acute or worrisome osseous lesions.        Impression    IMPRESSION:   1. Subcutaneous fat stranding in the perineum, right greater than  left, compatible with infection. A 3 cm right paramedian peroneal  subcutaneous density may represent inflammation or scarring related to  previous Marley's gangrene in this region. Within, adjacent to, or  immediately deep to the skin in this region, there is a tiny punctate  focus of gas, which may represent the tiny punctum described on  physical exam. Otherwise, no significant soft tissue gas. No  well-defined peripherally enhancing abscess.  2. Diffuse hepatic steatosis.    I have personally reviewed the examination and initial interpretation  and I agree with the findings.    KRISTAN HURTADO MD   Glucose by meter   Result Value Ref Range    Glucose 154 (H) 70 - 99 mg/dL   CBC with platelets differential   Result Value Ref Range    WBC 11.4 (H) 4.0 - 11.0 10e9/L    RBC Count 4.64 4.4 - 5.9 10e12/L    Hemoglobin 14.8 13.3 - 17.7 g/dL    Hematocrit 44.9 40.0 - 53.0 %    MCV 97 78 -  100 fl    MCH 31.9 26.5 - 33.0 pg    MCHC 33.0 31.5 - 36.5 g/dL    RDW 12.2 10.0 - 15.0 %    Platelet Count 195 150 - 450 10e9/L    Diff Method Automated Method     % Neutrophils 67.2 %    % Lymphocytes 22.5 %    % Monocytes 7.7 %    % Eosinophils 1.8 %    % Basophils 0.3 %    % Immature Granulocytes 0.5 %    Nucleated RBCs 0 0 /100    Absolute Neutrophil 7.6 1.6 - 8.3 10e9/L    Absolute Lymphocytes 2.6 0.8 - 5.3 10e9/L    Absolute Monocytes 0.9 0.0 - 1.3 10e9/L    Absolute Eosinophils 0.2 0.0 - 0.7 10e9/L    Absolute Basophils 0.0 0.0 - 0.2 10e9/L    Abs Immature Granulocytes 0.1 0 - 0.4 10e9/L    Absolute Nucleated RBC 0.0    Basic metabolic panel   Result Value Ref Range    Sodium 136 133 - 144 mmol/L    Potassium 4.0 3.4 - 5.3 mmol/L    Chloride 104 94 - 109 mmol/L    Carbon Dioxide 26 20 - 32 mmol/L    Anion Gap 6 3 - 14 mmol/L    Glucose 136 (H) 70 - 99 mg/dL    Urea Nitrogen 10 7 - 30 mg/dL    Creatinine 0.58 (L) 0.66 - 1.25 mg/dL    GFR Estimate >90 >60 mL/min/1.7m2    GFR Estimate If Black >90 >60 mL/min/1.7m2    Calcium 8.8 8.5 - 10.1 mg/dL   Creatinine POCT   Result Value Ref Range    Creatinine 0.6 (L) 0.66 - 1.25 mg/dL    GFR Estimate >90 >60 mL/min/1.7m2    GFR Estimate If Black >90 >60 mL/min/1.7m2       Labs Ordered and Resulted from Time of ED Arrival Up to the Time of Departure from the ED   GLUCOSE BY METER - Abnormal; Notable for the following:        Result Value    Glucose 154 (*)     All other components within normal limits   CBC WITH PLATELETS DIFFERENTIAL - Abnormal; Notable for the following:     WBC 11.4 (*)     All other components within normal limits   BASIC METABOLIC PANEL - Abnormal; Notable for the following:     Glucose 136 (*)     Creatinine 0.58 (*)     All other components within normal limits   CREATININE POCT - Abnormal; Notable for the following:     Creatinine 0.6 (*)     All other components within normal limits   GLUCOSE MONITOR NURSING POCT   ISTAT CREATININE NURSING POCT    PERIPHERAL IV CATHETER   VITAL SIGNS         Assessments & Plan (with Medical Decision Making)     I have reviewed the nursing notes.    General surgery saw the patient in the ER and recommended dressing changes with no I&D at this time.  Patient was instructed to follow-up with the surgery clinic on Friday for recheck.  Dressing change instructions were provided by the [facility].    I have reviewed the findings, diagnosis, plan and need for follow up with the patient.    New Prescriptions    No medications on file       Final diagnoses:   Postoperative wound breakdown, initial encounter - perineum     Follow treatment recommendations as per general surgery.    Please follow up with Surgery (General) (phone: [phone]) on Friday as instructed.    Return to the ER for problems or fevers.    Angelito Echeverria MD    11/5/2018   [facility], [place], EMERGENCY DEPARTMENT     Angelito Echeverria MD  11/05/18 [id number]

## 2018-11-05 NOTE — CONSULTS
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 Mr Callejas saw Dr Antoine in clinic 1 week prior for evaluation of his wound which had been draining but stopped shortly before his clinic visit. During the visit he was seen to have no induration, erythema, or drainage. Estevan says that after that visit he started developing tension in his perineal area and that last night it started draining serosanguinous fluid again with great relief of his tension symptoms. Denies cp, sob, abdominal pain, constipation or diarrhea (having regular 2-3 bms per day).    ROS: 10 point ROS was conducted and negative except as mentioned in the HPI.  PMHx: DMT2 (poorly controlled)  PSHx: Necrotizing soft tissue infection in the perineum debridement on 1/14/18 w/ wound vac changes, orthopedic surgery, tonsils  FHx: Denies FH of bleeding or anesthesia rxns  SH: Current smoker (~1 ppd), drinks EtOH  Meds: Include asa 81, atorvastatin, glipizide, lisinopril, metformin  Allergies: Answered no known drug allergies    BP (!) 191/98  Pulse 86  Temp 98.4  F (36.9  C) (Oral)  Resp 16  Wt 123.8 kg (273 lb)  SpO2 97%  BMI 40.32 kg/m2    Gen: Well appearing in nad  Eyes: No scleral icterus  Pulm: NLB on RA  Abd: Soft, non-tender, non-distended. No r/g. Obese  Groin: On the right side of the patient's perineal wound there is some very faint erythema with some induration. At the midline there is an opening that does not track significantly, draining some serosanguinous fluid. Some fluid can be expressed from manual pressure on indurated tissue  Ext: Wwp  Psych: Cooperative  Neuro: CN II-XII intact    WBC 11.4    CT Abd/Pelvis shows some fat stranding around wound, no abscess. Punctate air in  wound likely of no significance    Assessment: 43 year old male with a past medical history of DMT2 and necrotizing soft tissue infection of the groin s/p debridement ~10 months prior now with what sounds like a drained abscess. The patient says he had a tightness in his perineum that he felt pop and start draining. Looking at the wound today there is what seems like adequate drainage, and the wound is not large enough for packing and does not track. The induration and erythema is very minimal, not enough to warrant concern for clinically significant cellulitis at this time. No sign of fistulous track or abscess on CT.    Plan:  - I instructed the patient to wash his wound twice a day and to apply new dressing to cover it  - Will follow up on 11/9 with Dr Antoine  - Should come back to the ED if he develops a fever, worsening induration or erythema at his wound site  - Ok to discharge from the ED    Evaluated w/ staff, Dr Antoine    --  Rey Patino  General Surgery PGY2

## 2018-11-06 ENCOUNTER — TELEPHONE (OUTPATIENT)
Dept: SURGERY | Facility: CLINIC | Age: 43
End: 2018-11-06

## 2018-11-06 NOTE — TELEPHONE ENCOUNTER
[facility] Call Center    Phone Message    May a detailed message be left on voicemail: yes    Reason for Call: Other: Patient states he saw Dr. Antoine yesterday and that he was supposed to make an appointment to follow up this Friday. Please give patient a call back to schedule an appointment. Thank you.     Action Taken: Message routed to:  [facility] ([facility] General Surgery

## 2018-11-06 NOTE — TELEPHONE ENCOUNTER
Per task, this writer called the patient and scheduled an appointment for him to see Dr. Brandy Smith on 11/09/2018 at 1:00 pm. The patient would like to be seen later if possible this writer let the patient know he would be contacted if an appointment later in the day became available. The patient agreed to this plan. This writer confirmed appointment details with the patient.

## 2018-11-09 ENCOUNTER — OFFICE VISIT (OUTPATIENT)
Dept: SURGERY | Facility: CLINIC | Age: 43
End: 2018-11-09
Payer: COMMERCIAL

## 2018-11-09 VITALS
OXYGEN SATURATION: 97 % | HEIGHT: 69 IN | DIASTOLIC BLOOD PRESSURE: 95 MMHG | SYSTOLIC BLOOD PRESSURE: 149 MMHG | HEART RATE: 85 BPM | WEIGHT: 278.4 LBS | BODY MASS INDEX: 41.23 KG/M2 | TEMPERATURE: 98.5 F

## 2018-11-09 DIAGNOSIS — T14.8XXA OPEN WOUND: Primary | ICD-10-CM

## 2018-11-09 ASSESSMENT — PAIN SCALES - GENERAL: PAINLEVEL: NO PAIN (0)

## 2018-11-09 NOTE — PATIENT INSTRUCTIONS
"  You met with Dr. Brandy Smith.          Today's visit instructions:      Your initial infection was called \"Marley's Gangrene\"    This is an infection of the skin and soft tissue.    Your wound is healing well and there is no need for antibiotics or additional wound care at this time    Return to the Surgery Clinic on an as needed basis.        If you have questions please contact Almas RN or Cely RN during regular clinic hours, Monday through Friday 7:30 AM - 4:00 PM, or you can contact us via [employer] at anytime.       If you have urgent needs after-hours, weekends, or holidays please call the hospital at [phone] and ask to speak with our on-call General Surgery Team.    Appointment [phone], option #1   Nurse Advice (Almas or Cely): [phone]   Surgery Scheduler (Sofya): [phone]  Fax: [phone]            "

## 2018-11-09 NOTE — NURSING NOTE
"Chief Complaint   Patient presents with     Consult     RETURN - Wound check       Vitals:    11/09/18 1248   BP: (!) 149/95   Pulse: 85   Temp: 98.5  F (36.9  C)   TempSrc: Oral   SpO2: 97%   Weight: 278 lb 6.4 oz   Height: 5' 9\"       Body mass index is 41.11 kg/(m^2).     Patient also voiced concerns regarding financial situation. He explained that he was contacted after his previous visit with us and was told to call [employer] for any assistance programs. Patient stated he has not called yet because he did not know the number to call.     I provided the patient with the [employer] Billing number ([phone]) and showed him where he can find the online [employer] Billing information.    Tom Diaz on 11/9/2018 at 1:10 PM                       "

## 2018-11-09 NOTE — LETTER
November 9, 2018    RE:  Estevan Caleljas                              [address] [facility] [address]            To whom it may concern:    Estevan Callejas is under my professional care for skin and soft tissue infection.  I saw him in clinic today, 11/09/18.  He was also evaluated in the emergency room on November 5th.  Please excuse his absence at work on these days.      He may return to work 11/10/18 without restrictions.      Sincerely,        Brandy Smith MD

## 2018-11-09 NOTE — NURSING NOTE
"Chief Complaint   Patient presents with     Consult     RETURN - Wound check       Vitals:    11/09/18 1248   BP: (!) 149/95   Pulse: 85   Temp: 98.5  F (36.9  C)   TempSrc: Oral   SpO2: 97%   Weight: 278 lb 6.4 oz   Height: 5' 9\"       Body mass index is 41.11 kg/(m^2).      Tom Diaz on 11/9/2018 at 12:55 PM                          "

## 2018-11-09 NOTE — LETTER
"11/9/2018       RE: Estevan Callejas  [address] [facility] [address]     Dear Colleague,    Thank you for referring your patient, Estevan Callejas, to the [facility] GENERAL SURGERY at [facility]. Please see a copy of my visit note below.    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    He presented to the ER on November 5th with complaints of pain and pressure the perineal region.  This had been progressive over several days and on the day of presentation  he felt a \"pop\" and had purulent drainage coming from the perineum.  In the ER, he had labs drawn and a CT pelvis that showed stranding, but no  abscess.  He was evaluated by general surgery and his wound was felt to be adequately drained and too small to pack.  He was discharged home with instructions to wash the wound with soap and water daily.    Today, the patient reports that he is feeling well.  He denies fever or chills. He says that the pain and pressure in his perineal region is much improved.  He states that for several days after his ER visit, he kept a gauze pad in his underwear to collect purulent drainage.  But he states that that had decreased significantly and now he has scant to no drainage.    PE:  BP (!) 149/95  Pulse 85  Temp 98.5  F (36.9  C) (Oral)  Ht 5' 9\"  Wt 278 lb 6.4 oz  SpO2 97%  BMI 41.11 kg/m2  NAD  RRR  CTAB  Perineum:  ~ 1 cm sinus tract in lower gluteal cleft, surround tissue soft, no fluctuance;  Minimal purulent drainage coming from the sinus tract.  No surround cellulitis.    A/P  Pt with history of marley's gangrene, recently evaluated in ER for draining wound in the perineal area.      Thre continues to be a small sinus tract in the perineum, ~ 1 cm deep with minimal " purulent drainage.  No cellulitis.  No fluctuance to suggest abscess.    Cont general cares, hygiene practices.  No surgical intervention or antibiotic therapy warranted.  F/U PRN    Again, thank you for allowing me to participate in the care of your patient.      Sincerely,    Brandy Smith MD

## 2018-11-09 NOTE — PROGRESS NOTES
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    He presented to the ER on November 5th with complaints of pain and pressure the perineal region.  This had been progressive over several days and on the day of presentation  he felt a \"pop\" and had purulent drainage coming from the perineum.  In the ER, he had labs drawn and a CT pelvis that showed stranding, but no  abscess.  He was evaluated by general surgery and his wound was felt to be adequately drained and too small to pack.  He was discharged home with instructions to wash the wound with soap and water daily.    Today, the patient reports that he is feeling well.  He denies fever or chills. He says that the pain and pressure in his perineal region is much improved.  He states that for several days after his ER visit, he kept a gauze pad in his underwear to collect purulent drainage.  But he states that that had decreased significantly and now he has scant to no drainage.    PE:  BP (!) 149/95  Pulse 85  Temp 98.5  F (36.9  C) (Oral)  Ht 5' 9\"  Wt 278 lb 6.4 oz  SpO2 97%  BMI 41.11 kg/m2  NAD  RRR  CTAB  Perineum:  ~ 1 cm sinus tract in lower gluteal cleft, surround tissue soft, no fluctuance;  Minimal purulent drainage coming from the sinus tract.  No surround cellulitis.    A/P  Pt with history of marley's gangrene, recently evaluated in ER for draining wound in the perineal area.      Thre continues to be a small sinus tract in the perineum, ~ 1 cm deep with minimal purulent drainage.  No cellulitis.  No fluctuance to suggest abscess.    Cont general cares, hygiene practices.  No surgical intervention or antibiotic therapy warranted.  F/U PRN    Brandy Smith  11/09/18    "

## 2018-11-09 NOTE — MR AVS SNAPSHOT
"              After Visit Summary   2018    Estevan Callejas    MRN: [id number]           Patient Information     Date Of Birth          1975        Visit Information        Provider Department      2018 1:00 PM Brandy Smith MD [facility] Surgery        Care Instructions      You met with Dr. Brandy Smith.          Today's visit instructions:      Your initial infection was called \"Marley's Gangrene\"    This is an infection of the skin and soft tissue.    Your wound is healing well and there is no need for antibiotics or additional wound care at this time    Return to the Surgery Clinic on an as needed basis.        If you have questions please contact Almas RN or Cely RN during regular clinic hours, Monday through Friday 7:30 AM - 4:00 PM, or you can contact us via [employer] at anytime.       If you have urgent needs after-hours, weekends, or holidays please call the hospital at [phone] and ask to speak with our on-call General Surgery Team.    Appointment [phone], option #1   Nurse Advice (Almas or Cely): [phone]   Surgery Scheduler (Sofya): [phone]  Fax: [phone]                    Follow-ups after your visit        Who to contact     Please call your clinic at [phone] to:    Ask questions about your health    Make or cancel appointments    Discuss your medicines    Learn about your test results    Speak to your doctor            Additional Information About Your Visit        Care EveryWhere ID     This is your Care EveryWhere ID. This could be used by other organizations to access your [place] medical records  [id number]        Your Vitals Were     Pulse Temperature Height Pulse Oximetry BMI (Body Mass Index)       85 98.5  F (36.9  C) (Oral) 5' 9\" 97% 41.11 kg/m2        Blood Pressure from Last 3 Encounters:   18 (!) 149/95   18 134/83   18 124/78    Weight from Last 3 Encounters:   18 278 lb 6.4 oz   18 " 273 lb   10/29/18 274 lb              Today, you had the following     No orders found for display       Primary Care Provider Office Phone # Fax #    Lawanda Barrera [phone]       [address] [facility] [address]  [facility] [address]        Equal Access to Services     DAVID JORGE : Alanna santana hadrayshawno Soomaali, waaxda luqadaha, qaybta kaalmada adeegyada, elisabeth stokesnorbertjefe miller. So [facility] [phone].    ATENCIÓN: Si habla español, tiene a parker disposición servicios gratuitos de asistencia lingüística. [facility] [phone].    We comply with applicable federal civil rights laws and [place] laws. We do not discriminate on the basis of race, color, national origin, age, disability, sex, sexual orientation, or gender identity.            Thank you!     Thank you for choosing [facility] SURGERY  for your care. Our goal is always to provide you with excellent care. Hearing back from our patients is one way we can continue to improve our services. Please take a few minutes to complete the written survey that you may receive in the mail after your visit with us. Thank you!             Your Updated Medication List - Protect others around you: Learn how to safely use, store and throw away your medicines at www.disposemymeds.org.          This list is accurate as of 11/9/18  1:20 PM.  Always use your most recent med list.                   Brand Name Dispense Instructions for use Diagnosis    acetaminophen 325 MG tablet    TYLENOL    100 tablet    Take 2 tablets (650 mg) by mouth every 4 hours as needed for other (multimodal surgical pain management along with NSAIDS and opioid medication as indicated based on pain control and physical function.)    Perirectal abscess       aspirin 81 MG tablet     90 tablet    Take 1 tablet (81 mg) by mouth daily    Type 2 diabetes mellitus without complication, without long-term current use of insulin (H)       atorvastatin 20 MG tablet    LIPITOR     "90 tablet    Take 1 tablet (20 mg) by mouth daily    Type 2 diabetes mellitus with complication, without long-term current use of insulin (H)       blood glucose lancets standard    no brand specified    100 each    Use to test blood sugar one times daily or as directed.    Type 2 diabetes mellitus with complication, without long-term current use of insulin (H)       blood glucose monitoring meter device kit    no brand specified    1 kit    Use to test blood sugar one times daily or as directed.    Type 2 diabetes mellitus with complication, without long-term current use of insulin (H)       Cotton Swabs Swab     100 each    1 each as needed    Type 2 diabetes mellitus without complication, without long-term current use of insulin (H)       gabapentin 300 MG capsule    NEURONTIN    270 capsule    TAKE ONE CAPSULE BY MOUTH THREE TIMES DAILY    Type 2 diabetes mellitus without complication, without long-term current use of insulin (H)       gauze pads & dressings 4\"X4\" Pads     1 each    100 each as needed    Type 2 diabetes mellitus without complication, without long-term current use of insulin (H)       glipiZIDE 5 MG 24 hr tablet    GLUCOTROL XL    90 tablet    Take 1 tablet (5 mg) by mouth daily    Type 2 diabetes mellitus with other specified complication, unspecified long term insulin use status       LAXATIVE PO           lisinopril 2.5 MG tablet    PRINIVIL/Zestril    90 tablet    Take 1 tablet (2.5 mg) by mouth daily    Type 2 diabetes mellitus without complication, without long-term current use of insulin (H)       metFORMIN 500 MG tablet    GLUCOPHAGE    180 tablet    Take 2 tablets (1,000 mg) by mouth daily (with breakfast)    Type 2 diabetes mellitus with complication, without long-term current use of insulin (H)       ONETOUCH DELICA LANCETS 33G Misc     100 each    USE TO TEST BLOOD SUGARS ONE TIME DAILY OR AS DIRECTED    Type 2 diabetes, HbA1c goal < 7% (H)       ONETOUCH ULTRA test strip   Generic " drug:  blood glucose monitoring     100 strip    USE TO TEST BLOOD SUGARS 3 TIMES DAILY IN THE MORNING. REASON:UNCONTROLLED SUGARS WITH RECENT INFECTIONS    Type 2 diabetes mellitus with complication, without long-term current use of insulin (H)       polyethylene glycol Packet    MIRALAX/GLYCOLAX    30 packet    Take 17 g by mouth daily as needed for constipation    Drug-induced constipation

## 2018-12-31 ENCOUNTER — TELEPHONE (OUTPATIENT)
Dept: FAMILY MEDICINE | Facility: OTHER | Age: 43
End: 2018-12-31

## 2018-12-31 DIAGNOSIS — F17.200 TOBACCO USE DISORDER: Primary | ICD-10-CM

## 2018-12-31 DIAGNOSIS — E11.9 TYPE 2 DIABETES, HBA1C GOAL < 7% (H): ICD-10-CM

## 2018-12-31 NOTE — LETTER
[address]   [facility] [address]  Phone: [phone]  January 14, 2019      Estevan Callejas  [address] [facility] [address]      Dear Estevan,    We care about your health and have reviewed your health plan including your medical conditions, medications, and lab results.  Based on this review, it is recommended that you follow up regarding the following health topic(s):  -Diabetes    We recommend you take the following action(s):  -schedule a FOLLOWUP OFFICE APPOINTMENT.  We will perform the following labs:  A1c, Lipids (fasting cholesterol - nothing to eat except water and/or meds for 8-10 hours) and Microablumin.     Please call us at the [facility] - [phone] (or use [facility]) to address the above recommendations.     Thank you for trusting [facility] and we appreciate the opportunity to serve you.  We look forward to supporting your healthcare needs in the future.    Healthy Regards,    Your Health Care Team  [facility]

## 2018-12-31 NOTE — TELEPHONE ENCOUNTER
Panel Management Review      Patient has the following on his problem list:     Diabetes    ASA: Passed    Last A1C  Lab Results   Component Value Date    A1C 5.7 06/29/2018    A1C 6.7 01/30/2018    A1C 5.8 06/12/2017    A1C 6.3 03/09/2017    A1C 10.3 11/07/2016     A1C tested: Passed    Last LDL:    Lab Results   Component Value Date    CHOL 221 01/30/2018     Lab Results   Component Value Date    HDL 67 01/30/2018     Lab Results   Component Value Date    LDL  01/30/2018     Cannot estimate LDL when triglyceride exceeds 400 mg/dL     01/30/2018     Lab Results   Component Value Date    TRIG 505 01/30/2018     Lab Results   Component Value Date    CHOLHDLRATIO 2.4 03/13/2015     Lab Results   Component Value Date    NHDL 154 01/30/2018       Is the patient on a Statin? YES             Is the patient on Aspirin? YES    Medications     HMG CoA Reductase Inhibitors    atorvastatin (LIPITOR) 20 MG tablet    Salicylates    aspirin 81 MG tablet          Last three blood pressure readings:  BP Readings from Last 3 Encounters:   11/09/18 (!) 149/95   11/05/18 134/83   06/29/18 124/78          Tobacco History:     History   Smoking Status     Current Some Day Smoker     Types: Dip, chew, snus or snuff, Cigarettes   Smokeless Tobacco     Current User           Composite cancer screening  Chart review shows that this patient is due/due soon for the following None  Summary:    Patient is due/failing the following:   Diabetic follow up, Microalbumin, LDL and A1C    Action needed:   Patient needs office visit for diabetic follow up. and Patient needs fasting lab only appointment    Type of outreach:    Phone, spoke to patient.  patient scheduled OV  Unable to schedule any earlier than 4    Questions for provider review:    None                                                                                                                                    Marcela Collins       Chart routed to Care Team .

## 2019-01-02 DIAGNOSIS — E11.8 TYPE 2 DIABETES MELLITUS WITH COMPLICATION, WITHOUT LONG-TERM CURRENT USE OF INSULIN (H): ICD-10-CM

## 2019-01-03 NOTE — TELEPHONE ENCOUNTER
"Requested Prescriptions   Pending Prescriptions Disp Refills     metFORMIN (GLUCOPHAGE) 500 MG tablet [Pharmacy Med Name: METFORMIN  MG TABLET] 180 tablet 1     Sig: Take 2 tablets (1,000mg) by mouth daily with breakfast    Biguanide Agents Failed - 1/2/2019  8:00 AM       Failed - Blood pressure less than 140/90 in past 6 months    BP Readings from Last 3 Encounters:   11/09/18 (!) 149/95   11/05/18 134/83   06/29/18 124/78          Failed - Patient has documented A1c within the specified period of time.    If HgbA1C is 8 or greater, it needs to be on file within the past 3 months.  If less than 8, must be on file within the past 6 months.     Recent Labs   Lab Test 06/29/18  1450   A1C 5.7*          Passed - Patient has documented LDL within the past 12 mos.    Recent Labs   Lab Test 01/30/18  1456   LDL Cannot estimate LDL when triglyceride exceeds 400 mg/dL  119*          Passed - Patient has had a Microalbumin in the past 15 mos.    Recent Labs   Lab Test 01/30/18  1542   MICROL 21   UMALCR 9.72            Passed - Patient is age 10 or older       Passed - Patient's CR is NOT>1.4 OR Patient's EGFR is NOT<45 within past 12 mos.    Recent Labs   Lab Test 11/05/18  1220   GFRESTIMATED >90   GFRESTBLACK >90     Recent Labs   Lab Test 11/05/18  1219   CR 0.58*          Passed - Patient does NOT have a diagnosis of CHF.       Passed - Recent (6 mo) or future (30 days) visit within the authorizing provider's specialty    Patient had office visit in the last 6 months or has a visit in the next 30 days with authorizing provider or within the authorizing provider's specialty.  See \"Patient Info\" tab in inbasket, or \"Choose Columns\" in Meds & Orders section of the refill encounter.            Medication is being filled for 1 time refill only due to:  Patient needs to be seen because due for DM follow up..   Please call and help schedule.  Charlie Vidales, RN, BSN          "

## 2019-02-11 DIAGNOSIS — E11.8 TYPE 2 DIABETES MELLITUS WITH COMPLICATION, WITHOUT LONG-TERM CURRENT USE OF INSULIN (H): ICD-10-CM

## 2019-02-11 DIAGNOSIS — E11.9 TYPE 2 DIABETES MELLITUS WITHOUT COMPLICATION, WITHOUT LONG-TERM CURRENT USE OF INSULIN (H): ICD-10-CM

## 2019-02-12 RX ORDER — LISINOPRIL 2.5 MG/1
TABLET ORAL
Qty: 30 TABLET | Refills: 0 | Status: SHIPPED | OUTPATIENT
Start: 2019-02-12 | End: 2019-03-22

## 2019-02-12 RX ORDER — GABAPENTIN 300 MG/1
CAPSULE ORAL
Qty: 270 CAPSULE | Refills: 0 | Status: SHIPPED | OUTPATIENT
Start: 2019-02-12 | End: 2019-03-22

## 2019-02-12 NOTE — TELEPHONE ENCOUNTER
Please have him come in for blood pressure recheck at the pharmacy . He is also overdue for his diabetes follow up. Please schedule an earliest appt/ Script sent for gabapentin

## 2019-02-12 NOTE — TELEPHONE ENCOUNTER
Called and spoke with patient regarding message below.  Patient verbalized understanding.  Scheduled patient for 2/28/19 with AISHA Chi CMA ([facility])

## 2019-02-12 NOTE — TELEPHONE ENCOUNTER
"Requested Prescriptions   Pending Prescriptions Disp Refills     gabapentin (NEURONTIN) 300 MG capsule [Pharmacy Med Name: GABAPENTIN 300 MG CAPSULE] 270 capsule 0     Sig: TAKE ONE CAPSULE BY MOUTH THREE TIMES DAILY    There is no refill protocol information for this order        lisinopril (PRINIVIL/ZESTRIL) 2.5 MG tablet [Pharmacy Med Name: LISINOPRIL 2.5 MG TABLET] 90 tablet 3     Sig: TAKE ONE TABLET BY MOUTH once daily    ACE Inhibitors (Including Combos) Protocol Failed - 2/11/2019  8:39 AM       Failed - Blood pressure under 140/90 in past 12 months    BP Readings from Last 3 Encounters:   11/09/18 (!) 149/95   11/05/18 134/83   06/29/18 124/78          Failed - Normal serum creatinine on file in past 12 months    Recent Labs   Lab Test 11/05/18  1220 11/05/18  1219   CR  --  0.58*   CREAT 0.6*  --           Passed - Recent (12 mo) or future (30 days) visit within the authorizing provider's specialty    Patient had office visit in the last 12 months or has a visit in the next 30 days with authorizing provider or within the authorizing provider's specialty.  See \"Patient Info\" tab in inbasket, or \"Choose Columns\" in Meds & Orders section of the refill encounter.           Passed - Medication is active on med list       Passed - Patient is age 18 or older       Passed - Normal serum potassium on file in past 12 months    Recent Labs   Lab Test 11/05/18  1219   POTASSIUM 4.0           metFORMIN (GLUCOPHAGE) 500 MG tablet [Pharmacy Med Name: METFORMIN  MG TABLET] 60 tablet 0     Sig: Take 2 tablets (1,000mg) by mouth daily with breakfast    Biguanide Agents Failed - 2/11/2019  8:39 AM       Failed - Blood pressure less than 140/90 in past 6 months    BP Readings from Last 3 Encounters:   11/09/18 (!) 149/95   11/05/18 134/83   06/29/18 124/78          Failed - Patient has documented LDL within the past 12 mos.    Recent Labs   Lab Test 01/30/18  1456   LDL Cannot estimate LDL when triglyceride exceeds 400 " "mg/dL  119*          Failed - Patient has documented A1c within the specified period of time.    If HgbA1C is 8 or greater, it needs to be on file within the past 3 months.  If less than 8, must be on file within the past 6 months.     Recent Labs   Lab Test 06/29/18  1450   A1C 5.7*          Failed - Recent (6 mo) or future (30 days) visit within the authorizing provider's specialty    Patient had office visit in the last 6 months or has a visit in the next 30 days with authorizing provider or within the authorizing provider's specialty.  See \"Patient Info\" tab in inbasket, or \"Choose Columns\" in Meds & Orders section of the refill encounter.           Passed - Patient has had a Microalbumin in the past 15 mos.    Recent Labs   Lab Test 01/30/18  1542   MICROL 21   UMALCR 9.72          Passed - Patient is age 10 or older       Passed - Patient's CR is NOT>1.4 OR Patient's EGFR is NOT<45 within past 12 mos.    Recent Labs   Lab Test 11/05/18  1220   GFRESTIMATED >90   GFRESTBLACK >90       Recent Labs   Lab Test 11/05/18  1219   CR 0.58*          Passed - Patient does NOT have a diagnosis of CHF.       Passed - Medication is active on med list        Medication is being filled for 1 time refill only due to:  Patient needs to be seen because due for DM OV..route remaining to provider to review.    Charlie Vidales, RN, BSN      Please call and help schedule.      "

## 2019-03-13 ENCOUNTER — TELEPHONE (OUTPATIENT)
Dept: FAMILY MEDICINE | Facility: OTHER | Age: 44
End: 2019-03-13

## 2019-03-13 DIAGNOSIS — E11.8 TYPE 2 DIABETES MELLITUS WITH COMPLICATION, WITHOUT LONG-TERM CURRENT USE OF INSULIN (H): ICD-10-CM

## 2019-03-14 ENCOUNTER — TELEPHONE (OUTPATIENT)
Dept: FAMILY MEDICINE | Facility: OTHER | Age: 44
End: 2019-03-14

## 2019-03-14 NOTE — TELEPHONE ENCOUNTER
Panel Management Review    Summary:    Patient is due/failing the following:   TSH, Microalbumin, A1C, FOLLOW UP, LDL and PHYSICAL    Action needed:   Patient needs office visit for Diabetes/Physical and needs fasting lab only appointment    Type of outreach:    Tried to reach patient- no answer/VM full.  Will try again at later date.    Questions for provider review:    None                                                                                                                                    Missy Chi CMA ([facility])       Chart routed to Care Team .      Patient has the following on his problem list:     Diabetes    ASA:     Last A1C  Lab Results   Component Value Date    A1C 5.7 06/29/2018    A1C 6.7 01/30/2018    A1C 5.8 06/12/2017    A1C 6.3 03/09/2017    A1C 10.3 11/07/2016     A1C tested: Passed    Last LDL:    Lab Results   Component Value Date    CHOL 221 01/30/2018     Lab Results   Component Value Date    HDL 67 01/30/2018     Lab Results   Component Value Date    LDL  01/30/2018     Cannot estimate LDL when triglyceride exceeds 400 mg/dL     01/30/2018     Lab Results   Component Value Date    TRIG 505 01/30/2018     Lab Results   Component Value Date    CHOLHDLRATIO 2.4 03/13/2015     Lab Results   Component Value Date    NHDL 154 01/30/2018       Is the patient on a Statin? YES             Is the patient on Aspirin? YES    Medications     HMG CoA Reductase Inhibitors     atorvastatin (LIPITOR) 20 MG tablet       Salicylates     aspirin 81 MG tablet             Last three blood pressure readings:  BP Readings from Last 3 Encounters:   11/09/18 (!) 149/95   11/05/18 134/83   06/29/18 124/78       Date of last diabetes office visit: 6/29/19     Tobacco History:     History   Smoking Status     Current Some Day Smoker     Types: Dip, chew, snus or snuff, Cigarettes   Smokeless Tobacco     Current User           Composite cancer screening  Chart review shows that this patient is  due/due soon for the following None

## 2019-03-14 NOTE — TELEPHONE ENCOUNTER
Metformin  Routing refill request to provider for review/approval because:  Keshia given x1 and patient did not follow up  Labs not current:  A1c, FLP    Magdalena Aguirre, RN, BSN

## 2019-03-15 NOTE — TELEPHONE ENCOUNTER
Next 5 appointments (look out 90 days)    Mar 22, 2019  2:20 PM CDT  [facility] Visit with [facility]rera [facility]  [facility] [address] [facility] [address]  [facility] [id number] [phone]

## 2019-03-15 NOTE — PROGRESS NOTES
SUBJECTIVE:   Estevan Callejas is a 43 year old male who presents to clinic today for the following health issues:      HPI     Diabetes Follow-up    Patient is checking blood sugars: once daily.  Results are as follows:    Diabetic concerns: None     Symptoms of hypoglycemia (low blood sugar): none     Paresthesias (numbness or burning in feet) or sores: Yes Burning Feeling     Date of last diabetic eye exam: 2/15/2018    BP Readings from Last 2 Encounters:   03/22/19 136/88   11/09/18 (!) 149/95     Hemoglobin A1C (%)   Date Value   03/22/2019 6.4 (H)   06/29/2018 5.7 (H)     LDL Cholesterol Calculated (mg/dL)   Date Value   01/30/2018     Cannot estimate LDL when triglyceride exceeds 400 mg/dL   12/16/2016 63     LDL Cholesterol Direct (mg/dL)   Date Value   03/22/2019 55   01/30/2018 119 (H)       Diabetes Management Resources  Problem list and histories reviewed & adjusted, as indicated.  Additional history: as documented        Patient Active Problem List   Diagnosis     Peripheral neuropathy - near S2 dermatome     Type 2 diabetes, HbA1c goal < 7% (H)     Low back pain     Morbid obesity (H)     Perineal abscess     Epidermal cyst of face     Tobacco use disorder     Past Surgical History:   Procedure Laterality Date     EXAM UNDER ANESTHESIA, CHANGE DRESSING (LOCATION), COMBINED N/A 1/15/2018    Procedure: COMBINED EXAM UNDER ANESTHESIA, CHANGE DRESSING (LOCATION);  Dressing Change and exam under Monitored Anesthesia Care with wound vac placement;  Surgeon: Stefania Munroe MD;  Location: [facility]     EXAM UNDER ANESTHESIA, CHANGE DRESSING (LOCATION), COMBINED N/A 1/18/2018    Procedure: COMBINED EXAM UNDER ANESTHESIA, CHANGE DRESSING (LOCATION);  Serial wound vac change perineal;  Surgeon: Stefania Munroe MD;  Location: [facility] OR     IRRIGATION AND DEBRIDEMENT PERINEAL, COMBINED N/A 1/13/2018    Procedure: COMBINED IRRIGATION AND DEBRIDEMENT PERINEAL;  Irrigation and Debridement of  perianal abscess;  Surgeon: Stefania Munroe MD;  Location: [facility]     IRRIGATION AND DEBRIDEMENT PERINEAL, COMBINED N/A 1/14/2018    Procedure: COMBINED IRRIGATION AND DEBRIDEMENT PERINEAL;  combined irrigation and debridement perineal and dressing change;  Surgeon: Stefania Munroe MD;  Location: [facility] OR     ORTHOPEDIC SURGERY       TONSILLECTOMY, ADENOIDECTOMY, COMBINED         Social History     Tobacco Use     Smoking status: Current Some Day Smoker     Types: Dip, chew, snus or snuff, Cigarettes     Smokeless tobacco: Current User   Substance Use Topics     Alcohol use: Yes     History reviewed. No pertinent family history.      Current Outpatient Medications   Medication Sig Dispense Refill     acetaminophen (TYLENOL) 325 MG tablet Take 2 tablets (650 mg) by mouth every 4 hours as needed for other (multimodal surgical pain management along with NSAIDS and opioid medication as indicated based on pain control and physical function.) 100 tablet      Applicators (COTTON SWABS) SWAB 1 each as needed 100 each 3     aspirin 81 MG tablet Take 1 tablet (81 mg) by mouth daily 90 tablet 3     atorvastatin (LIPITOR) 20 MG tablet Take 1 tablet (20 mg) by mouth daily 90 tablet 3     Bisacodyl (LAXATIVE PO)        blood glucose (NO BRAND SPECIFIED) lancets standard Use to test blood sugar one times daily or as directed. 100 each 3     blood glucose (ONETOUCH ULTRA) test strip 1 strip by In Vitro route daily Use to test blood sugar one  times daily or as directed. 100 strip 3     blood glucose monitoring (NO BRAND SPECIFIED) meter device kit Use to test blood sugar one times daily or as directed. 1 kit 0     gabapentin (NEURONTIN) 300 MG capsule Take 2 capsules (600 mg) by mouth 3 times daily 540 capsule 1     glipiZIDE (GLUCOTROL XL) 5 MG 24 hr tablet Take 1 tablet (5 mg) by mouth daily 90 tablet 1     lisinopril (PRINIVIL/ZESTRIL) 5 MG tablet Take 1 tablet (5 mg) by mouth daily 90 tablet 1      "metFORMIN (GLUCOPHAGE) 500 MG tablet Take 2 tablets (1,000 mg) by mouth daily (with breakfast) 180 tablet 1     ONETOUCH DELICA LANCETS 33G MISC 1 Application by In Vitro route daily Uses to test blood sugars one time daily or as directed 100 each 3     Allergies   Allergen Reactions     Seasonal Allergies      Sneezing, itchy eyes     Recent Labs   Lab Test 03/22/19  1436 11/05/18  1220 11/05/18  1219 06/29/18  1450 01/30/18  1456  01/13/18  0126  03/09/17  1753 12/16/16  1436 11/07/16  1651 03/13/15  0822   A1C 6.4*  --   --  5.7* 6.7*  --   --    < > 6.3*  --  10.3*  --    LDL 55  --   --   --  Cannot estimate LDL when triglyceride exceeds 400 mg/dL  119*  --   --   --   --  63  --  66   HDL  --   --   --   --  67  --   --   --   --  65  --  64   TRIG  --   --   --   --  505*  --   --   --   --  104  --  117   ALT 40  --   --   --   --   --  107*  --   --   --  57  --    CR 0.76  --  0.58*  --  0.67   < > 0.71  --   --   --  0.80  --    GFRESTIMATED >90 >90 >90  --  >90   < > >90  --   --   --  >90  Non  GFR Calc    --    GFRESTBLACK >90 >90 >90  --  >90   < > >90  --   --   --  >90  African American GFR Calc    --    POTASSIUM 4.4  --  4.0  --  4.1   < > 3.7  --   --   --  4.3  --    TSH 0.92  --   --   --   --   --   --   --  3.28  --   --   --     < > = values in this interval not displayed.      BP Readings from Last 3 Encounters:   03/22/19 136/88   11/09/18 (!) 149/95   11/05/18 134/83    Wt Readings from Last 3 Encounters:   03/22/19 125.2 kg (276 lb)   11/09/18 126.3 kg (278 lb 6.4 oz)   11/05/18 123.8 kg (273 lb)          Review of Systems   All other systems reviewed and are negative.      /88 (BP Location: Left arm, Patient Position: Chair, Cuff Size: Adult Large)   Pulse 90   Temp 97.8  F (36.6  C) (Temporal)   Resp 16   Ht 1.753 m (5' 9\")   Wt 125.2 kg (276 lb)   SpO2 98%   BMI 40.76 kg/m    Physical Exam   Constitutional: He appears well-developed and well-nourished. " "  HENT:   Head: Normocephalic and atraumatic.   Eyes: EOM are normal.   Neck: Neck supple.   Cardiovascular: Normal rate, regular rhythm and normal heart sounds.   Pulmonary/Chest: Effort normal and breath sounds normal.   Psychiatric: He has a normal mood and affect. His behavior is normal. Judgment and thought content normal.            Assessment & Plan   Problem List Items Addressed This Visit     Type 2 diabetes, HbA1c goal < 7% (H)     Repeat a1c shows well controlled blood sugars. Continue current regimen   Refill meds  Recheck in 6 months         Relevant Medications    lisinopril (PRINIVIL/ZESTRIL) 5 MG tablet    gabapentin (NEURONTIN) 300 MG capsule    metFORMIN (GLUCOPHAGE) 500 MG tablet    glipiZIDE (GLUCOTROL XL) 5 MG 24 hr tablet    blood glucose (ONETOUCH ULTRA) test strip    ONETOUCH DELICA LANCETS 33G MISC    Tobacco use disorder      Other Visit Diagnoses     Screening for HIV (human immunodeficiency virus)        Relevant Orders    **Comprehensive metabolic panel FUTURE anytime (Completed)    LDL cholesterol direct (Completed)    Screening for diabetic peripheral neuropathy        Relevant Orders    FOOT EXAM  NO CHARGE [99172.114] (Completed)    TSH WITH FREE T4 REFLEX (Completed)    Need for prophylactic vaccination and inoculation against influenza                 Tobacco Cessation:   reports that he has been smoking dip, chew, snus or snuff and cigarettes.  He uses smokeless tobacco.        BMI:   Estimated body mass index is 40.76 kg/m  as calculated from the following:    Height as of this encounter: 1.753 m (5' 9\").    Weight as of this encounter: 125.2 kg (276 lb).             No follow-ups on file.    Lawanda Barrera MD  [facility]  "

## 2019-03-15 NOTE — TELEPHONE ENCOUNTER
Called and spoke with patient regarding message below.  Patient verbalized understanding.  Scheduled patient with RK on 3/22/19 for a diabetes recheck.  Missy Chi CMA (AAMA)

## 2019-03-22 ENCOUNTER — OFFICE VISIT (OUTPATIENT)
Dept: FAMILY MEDICINE | Facility: OTHER | Age: 44
End: 2019-03-22
Payer: COMMERCIAL

## 2019-03-22 VITALS
HEIGHT: 69 IN | OXYGEN SATURATION: 98 % | SYSTOLIC BLOOD PRESSURE: 136 MMHG | WEIGHT: 276 LBS | HEART RATE: 90 BPM | TEMPERATURE: 97.8 F | DIASTOLIC BLOOD PRESSURE: 88 MMHG | RESPIRATION RATE: 16 BRPM | BODY MASS INDEX: 40.88 KG/M2

## 2019-03-22 DIAGNOSIS — E11.9 TYPE 2 DIABETES MELLITUS WITHOUT COMPLICATION, WITHOUT LONG-TERM CURRENT USE OF INSULIN (H): ICD-10-CM

## 2019-03-22 DIAGNOSIS — Z11.4 SCREENING FOR HIV (HUMAN IMMUNODEFICIENCY VIRUS): ICD-10-CM

## 2019-03-22 DIAGNOSIS — E11.8 TYPE 2 DIABETES MELLITUS WITH COMPLICATION, WITHOUT LONG-TERM CURRENT USE OF INSULIN (H): ICD-10-CM

## 2019-03-22 DIAGNOSIS — F17.200 TOBACCO USE DISORDER: ICD-10-CM

## 2019-03-22 DIAGNOSIS — Z23 NEED FOR PROPHYLACTIC VACCINATION AND INOCULATION AGAINST INFLUENZA: ICD-10-CM

## 2019-03-22 DIAGNOSIS — E11.69 TYPE 2 DIABETES MELLITUS WITH OTHER SPECIFIED COMPLICATION, WITHOUT LONG-TERM CURRENT USE OF INSULIN (H): ICD-10-CM

## 2019-03-22 DIAGNOSIS — Z13.89 SCREENING FOR DIABETIC PERIPHERAL NEUROPATHY: ICD-10-CM

## 2019-03-22 LAB
ALBUMIN SERPL-MCNC: 4 G/DL (ref 3.4–5)
ALP SERPL-CCNC: 84 U/L (ref 40–150)
ALT SERPL W P-5'-P-CCNC: 40 U/L (ref 0–70)
ANION GAP SERPL CALCULATED.3IONS-SCNC: 8 MMOL/L (ref 3–14)
AST SERPL W P-5'-P-CCNC: 17 U/L (ref 0–45)
BILIRUB SERPL-MCNC: 0.7 MG/DL (ref 0.2–1.3)
BUN SERPL-MCNC: 12 MG/DL (ref 7–30)
CALCIUM SERPL-MCNC: 9 MG/DL (ref 8.5–10.1)
CHLORIDE SERPL-SCNC: 104 MMOL/L (ref 94–109)
CO2 SERPL-SCNC: 26 MMOL/L (ref 20–32)
CREAT SERPL-MCNC: 0.76 MG/DL (ref 0.66–1.25)
GFR SERPL CREATININE-BSD FRML MDRD: >90 ML/MIN/{1.73_M2}
GLUCOSE SERPL-MCNC: 107 MG/DL (ref 70–99)
HBA1C MFR BLD: 6.4 % (ref 0–5.6)
LDLC SERPL DIRECT ASSAY-MCNC: 55 MG/DL
POTASSIUM SERPL-SCNC: 4.4 MMOL/L (ref 3.4–5.3)
PROT SERPL-MCNC: 7.8 G/DL (ref 6.8–8.8)
SODIUM SERPL-SCNC: 138 MMOL/L (ref 133–144)
TSH SERPL DL<=0.005 MIU/L-ACNC: 0.92 MU/L (ref 0.4–4)

## 2019-03-22 PROCEDURE — 84443 ASSAY THYROID STIM HORMONE: CPT | Performed by: FAMILY MEDICINE

## 2019-03-22 PROCEDURE — 99207 C FOOT EXAM  NO CHARGE: CPT | Mod: 25 | Performed by: FAMILY MEDICINE

## 2019-03-22 PROCEDURE — 36415 COLL VENOUS BLD VENIPUNCTURE: CPT | Performed by: FAMILY MEDICINE

## 2019-03-22 PROCEDURE — 83721 ASSAY OF BLOOD LIPOPROTEIN: CPT | Performed by: FAMILY MEDICINE

## 2019-03-22 PROCEDURE — 80053 COMPREHEN METABOLIC PANEL: CPT | Performed by: FAMILY MEDICINE

## 2019-03-22 PROCEDURE — 99213 OFFICE O/P EST LOW 20 MIN: CPT | Performed by: FAMILY MEDICINE

## 2019-03-22 PROCEDURE — 83036 HEMOGLOBIN GLYCOSYLATED A1C: CPT | Performed by: FAMILY MEDICINE

## 2019-03-22 RX ORDER — GABAPENTIN 300 MG/1
600 CAPSULE ORAL 3 TIMES DAILY
Qty: 540 CAPSULE | Refills: 1 | Status: SHIPPED | OUTPATIENT
Start: 2019-03-22 | End: 2019-09-13

## 2019-03-22 RX ORDER — GLIPIZIDE 5 MG/1
5 TABLET, FILM COATED, EXTENDED RELEASE ORAL DAILY
Qty: 90 TABLET | Refills: 1 | Status: SHIPPED | OUTPATIENT
Start: 2019-03-22 | End: 2019-09-30

## 2019-03-22 RX ORDER — LANCETS 33 GAUGE
1 EACH MISCELLANEOUS DAILY
Qty: 100 EACH | Refills: 3 | Status: SHIPPED | OUTPATIENT
Start: 2019-03-22 | End: 2020-04-14

## 2019-03-22 RX ORDER — LISINOPRIL 5 MG/1
5 TABLET ORAL DAILY
Qty: 90 TABLET | Refills: 1 | Status: SHIPPED | OUTPATIENT
Start: 2019-03-22 | End: 2019-07-19

## 2019-03-22 ASSESSMENT — MIFFLIN-ST. JEOR: SCORE: 2142.31

## 2019-03-22 ASSESSMENT — PAIN SCALES - GENERAL: PAINLEVEL: NO PAIN (0)

## 2019-05-21 NOTE — ASSESSMENT & PLAN NOTE
Repeat a1c shows well controlled blood sugars. Continue current regimen   Refill meds  Recheck in 6 months

## 2019-06-11 ENCOUNTER — TELEPHONE (OUTPATIENT)
Dept: FAMILY MEDICINE | Facility: OTHER | Age: 44
End: 2019-06-11

## 2019-06-11 NOTE — TELEPHONE ENCOUNTER
Do not see any mention in recent notes about the cyst on the nose.  If it is external it depends on size, sometimes it needs to be seen by Dermatology because of cosmetic purposes and if there are concerns for skin cancer then it needs to be seen by Dermatology.  May require office visit to determine if it is appropriate to be treated in clinic or with dermatology.     Félix Lion PA-C

## 2019-06-11 NOTE — PROGRESS NOTES
Subjective     Estevan Callejas is a 43 year old male who presents to clinic today for the following health issues:    HPI   Cyst on face    Patient presents today for evaluation of a cyst on the left side of his face. He reports this has been present for several years. Will wax and wane in size. It has opened and drained a few times in the past. He reports not really painful. No surrounding redness. No fevers. He desires to have this removed.     Patient Active Problem List   Diagnosis     Peripheral neuropathy - near S2 dermatome     Type 2 diabetes, HbA1c goal < 7% (H)     Low back pain     Morbid obesity (H)     Perineal abscess     Epidermal cyst of face     Tobacco use disorder     Past Surgical History:   Procedure Laterality Date     EXAM UNDER ANESTHESIA, CHANGE DRESSING (LOCATION), COMBINED N/A 1/15/2018    Procedure: COMBINED EXAM UNDER ANESTHESIA, CHANGE DRESSING (LOCATION);  Dressing Change and exam under Monitored Anesthesia Care with wound vac placement;  Surgeon: Stefania Munroe MD;  Location: [facility]     EXAM UNDER ANESTHESIA, CHANGE DRESSING (LOCATION), COMBINED N/A 1/18/2018    Procedure: COMBINED EXAM UNDER ANESTHESIA, CHANGE DRESSING (LOCATION);  Serial wound vac change perineal;  Surgeon: Stefania Munroe MD;  Location: [facility]     IRRIGATION AND DEBRIDEMENT PERINEAL, COMBINED N/A 1/13/2018    Procedure: COMBINED IRRIGATION AND DEBRIDEMENT PERINEAL;  Irrigation and Debridement of perianal abscess;  Surgeon: Stefania Munroe MD;  Location: [facility]     IRRIGATION AND DEBRIDEMENT PERINEAL, COMBINED N/A 1/14/2018    Procedure: COMBINED IRRIGATION AND DEBRIDEMENT PERINEAL;  combined irrigation and debridement perineal and dressing change;  Surgeon: Stefania Munroe MD;  Location: [facility]     ORTHOPEDIC SURGERY       TONSILLECTOMY, ADENOIDECTOMY, COMBINED         Social History     Tobacco Use     Smoking status: Current Some Day Smoker     Types: Dip, chew,  snus or snuff, Cigarettes     Smokeless tobacco: Current User   Substance Use Topics     Alcohol use: Yes     No family history on file.      Current Outpatient Medications   Medication Sig Dispense Refill     acetaminophen (TYLENOL) 325 MG tablet Take 2 tablets (650 mg) by mouth every 4 hours as needed for other (multimodal surgical pain management along with NSAIDS and opioid medication as indicated based on pain control and physical function.) 100 tablet      aspirin 81 MG tablet Take 1 tablet (81 mg) by mouth daily 90 tablet 3     atorvastatin (LIPITOR) 20 MG tablet Take 1 tablet (20 mg) by mouth daily 90 tablet 3     gabapentin (NEURONTIN) 300 MG capsule Take 2 capsules (600 mg) by mouth 3 times daily 540 capsule 1     glipiZIDE (GLUCOTROL XL) 5 MG 24 hr tablet Take 1 tablet (5 mg) by mouth daily 90 tablet 1     lisinopril (PRINIVIL/ZESTRIL) 5 MG tablet Take 1 tablet (5 mg) by mouth daily 90 tablet 1     metFORMIN (GLUCOPHAGE) 500 MG tablet Take 2 tablets (1,000 mg) by mouth daily (with breakfast) 180 tablet 1     ONETOUCH DELICA LANCETS 33G MISC 1 Application by In Vitro route daily Uses to test blood sugars one time daily or as directed 100 each 3     Applicators (COTTON SWABS) SWAB 1 each as needed 100 each 3     Bisacodyl (LAXATIVE PO)        blood glucose (NO BRAND SPECIFIED) lancets standard Use to test blood sugar one times daily or as directed. 100 each 3     blood glucose (ONETOUCH ULTRA) test strip 1 strip by In Vitro route daily Use to test blood sugar one  times daily or as directed. 100 strip 3     blood glucose monitoring (NO BRAND SPECIFIED) meter device kit Use to test blood sugar one times daily or as directed. 1 kit 0     Allergies   Allergen Reactions     Seasonal Allergies      Sneezing, itchy eyes     BP Readings from Last 3 Encounters:   06/13/19 128/82   03/22/19 136/88   11/09/18 (!) 149/95    Wt Readings from Last 3 Encounters:   06/13/19 124.7 kg (275 lb)   03/22/19 125.2 kg (276 lb)  "  11/09/18 126.3 kg (278 lb 6.4 oz)         Reviewed and updated as needed this visit by Provider  Allergies  Meds  Problems  Med Hx  Surg Hx         Review of Systems   ROS COMP: Constitutional, HEENT, cardiovascular, pulmonary, gi and gu systems are negative, except as otherwise noted.      Objective    /82   Pulse 80   Temp 97.8  F (36.6  C) (Temporal)   Resp 16   Ht 1.791 m (5' 10.5\")   Wt 124.7 kg (275 lb)   SpO2 97%   BMI 38.90 kg/m    Body mass index is 38.9 kg/m .  Physical Exam   GENERAL: healthy, alert and no distress  SKIN: Approx 3 cm epidermal inclusion cyst present on the left cheek near nasolacrimal fold  PSYCH: mentation appears normal, affect normal/bright    Diagnostic Test Results:  Labs reviewed in Epic  none       Assessment & Plan     1. Epidermal inclusion cyst  Referral placed with general surgery and follow-up visit scheduled  - GENERAL SURG ADULT REFERRAL     The patient indicates understanding of these issues and agrees with the plan.    Dixie Diallo PA-C  [facility]    "

## 2019-06-11 NOTE — TELEPHONE ENCOUNTER
Reason for Call:  Other call back    Detailed comments: Pt was seen before and discussed a removal of his cyst on his nose. He was told that it would have to be removed surgically and would like to know if an office visit would be required.    He does have an appointment scheduled for 6/13/19 in case, but would like confirmation.    His wife and himself would also like to know which provider at [facility] would be able to do the procedure as they forgot the name.    Phone Number Patient can be reached at: Cell number on file:    Telephone Information:   Mobile [phone]       Best Time: Any time.    Can we leave a detailed message on this number? YES    Call taken on 6/11/2019 at 2:36 PM by Wang Wynn

## 2019-06-13 ENCOUNTER — OFFICE VISIT (OUTPATIENT)
Dept: FAMILY MEDICINE | Facility: OTHER | Age: 44
End: 2019-06-13
Payer: COMMERCIAL

## 2019-06-13 VITALS
HEIGHT: 71 IN | HEART RATE: 80 BPM | SYSTOLIC BLOOD PRESSURE: 128 MMHG | BODY MASS INDEX: 38.5 KG/M2 | WEIGHT: 275 LBS | DIASTOLIC BLOOD PRESSURE: 82 MMHG | TEMPERATURE: 97.8 F | OXYGEN SATURATION: 97 % | RESPIRATION RATE: 16 BRPM

## 2019-06-13 DIAGNOSIS — L72.0 EPIDERMAL INCLUSION CYST: Primary | ICD-10-CM

## 2019-06-13 PROCEDURE — 99213 OFFICE O/P EST LOW 20 MIN: CPT | Performed by: PHYSICIAN ASSISTANT

## 2019-06-13 ASSESSMENT — MIFFLIN-ST. JEOR: SCORE: 2161.58

## 2019-06-17 PROBLEM — T81.49XA WOUND INFECTION AFTER SURGERY: Status: RESOLVED | Noted: 2018-01-15 | Resolved: 2019-05-21

## 2019-06-21 ENCOUNTER — OFFICE VISIT (OUTPATIENT)
Dept: SURGERY | Facility: CLINIC | Age: 44
End: 2019-06-21
Payer: COMMERCIAL

## 2019-06-21 VITALS
DIASTOLIC BLOOD PRESSURE: 86 MMHG | TEMPERATURE: 98.2 F | BODY MASS INDEX: 38.5 KG/M2 | SYSTOLIC BLOOD PRESSURE: 130 MMHG | HEIGHT: 71 IN | WEIGHT: 275 LBS

## 2019-06-21 DIAGNOSIS — R22.9 SUBCUTANEOUS MASS: Primary | ICD-10-CM

## 2019-06-21 PROCEDURE — 99203 OFFICE O/P NEW LOW 30 MIN: CPT | Mod: 25 | Performed by: SURGERY

## 2019-06-21 PROCEDURE — 88304 TISSUE EXAM BY PATHOLOGIST: CPT | Mod: TC | Performed by: SURGERY

## 2019-06-21 PROCEDURE — 11443 EXC FACE-MM B9+MARG 2.1-3 CM: CPT | Mod: 51 | Performed by: SURGERY

## 2019-06-21 PROCEDURE — 12051 INTMD RPR FACE/MM 2.5 CM/<: CPT | Performed by: SURGERY

## 2019-06-21 ASSESSMENT — MIFFLIN-ST. JEOR: SCORE: 2161.58

## 2019-06-21 NOTE — PROGRESS NOTES
Patient seen in consultation for cyst on face by Lawanda Barrera    HPI:  Patient is a 43 year old male with ~10 year history of cyst on face. He states that sometimes it swells quite large and a few times it has drained spontaneously or has been lanced. He denies any recent infection of the cyst. He denies any medication allergies. He does have DM. He had a small cyst on the right side of the face that drained in the past and not come back.    Review Of Systems    Skin: as above  Ears/Nose/Throat: negative  Respiratory: No shortness of breath, dyspnea on exertion, cough, or hemoptysis  Cardiovascular: negative  Gastrointestinal: negative  Genitourinary: negative  Musculoskeletal: negative  Neurologic: negative  Hematologic/Lymphatic/Immunologic: negative  Endocrine: negative      Past Medical History:   Diagnosis Date     Diabetes (H)        Past Surgical History:   Procedure Laterality Date     EXAM UNDER ANESTHESIA, CHANGE DRESSING (LOCATION), COMBINED N/A 1/15/2018    Procedure: COMBINED EXAM UNDER ANESTHESIA, CHANGE DRESSING (LOCATION);  Dressing Change and exam under Monitored Anesthesia Care with wound vac placement;  Surgeon: Stefania Munroe MD;  Location: [facility]     EXAM UNDER ANESTHESIA, CHANGE DRESSING (LOCATION), COMBINED N/A 1/18/2018    Procedure: COMBINED EXAM UNDER ANESTHESIA, CHANGE DRESSING (LOCATION);  Serial wound vac change perineal;  Surgeon: Stefania Munroe MD;  Location: [facility]     IRRIGATION AND DEBRIDEMENT PERINEAL, COMBINED N/A 1/13/2018    Procedure: COMBINED IRRIGATION AND DEBRIDEMENT PERINEAL;  Irrigation and Debridement of perianal abscess;  Surgeon: Stefania Munroe MD;  Location: [facility]     IRRIGATION AND DEBRIDEMENT PERINEAL, COMBINED N/A 1/14/2018    Procedure: COMBINED IRRIGATION AND DEBRIDEMENT PERINEAL;  combined irrigation and debridement perineal and dressing change;  Surgeon: Stefania Munroe MD;  Location: [facility]      ORTHOPEDIC SURGERY       TONSILLECTOMY, ADENOIDECTOMY, COMBINED         No family history on file.    Social History     Socioeconomic History     Marital status: Single     Spouse name: Not on file     Number of children: Not on file     Years of education: Not on file     Highest education level: Not on file   Occupational History     Not on file   Social Needs     Financial resource strain: Not on file     Food insecurity:     Worry: Not on file     Inability: Not on file     Transportation needs:     Medical: Not on file     Non-medical: Not on file   Tobacco Use     Smoking status: Current Some Day Smoker     Types: Dip, chew, snus or snuff, Cigarettes     Smokeless tobacco: Current User   Substance and Sexual Activity     Alcohol use: Yes     Drug use: No     Sexual activity: Yes     Partners: Female     Birth control/protection: None   Lifestyle     Physical activity:     Days per week: Not on file     Minutes per session: Not on file     Stress: Not on file   Relationships     Social connections:     Talks on phone: Not on file     Gets together: Not on file     Attends Scientologist service: Not on file     Active member of club or organization: Not on file     Attends meetings of clubs or organizations: Not on file     Relationship status: Not on file     Intimate partner violence:     Fear of current or ex partner: Not on file     Emotionally abused: Not on file     Physically abused: Not on file     Forced sexual activity: Not on file   Other Topics Concern     Parent/sibling w/ CABG, MI or angioplasty before 65F 55M? Not Asked   Social History Narrative     Not on file       Current Outpatient Medications   Medication Sig Dispense Refill     acetaminophen (TYLENOL) 325 MG tablet Take 2 tablets (650 mg) by mouth every 4 hours as needed for other (multimodal surgical pain management along with NSAIDS and opioid medication as indicated based on pain control and physical function.) 100 tablet      Applicators  "(COTTON SWABS) SWAB 1 each as needed 100 each 3     aspirin 81 MG tablet Take 1 tablet (81 mg) by mouth daily 90 tablet 3     atorvastatin (LIPITOR) 20 MG tablet Take 1 tablet (20 mg) by mouth daily 90 tablet 3     Bisacodyl (LAXATIVE PO)        blood glucose (NO BRAND SPECIFIED) lancets standard Use to test blood sugar one times daily or as directed. 100 each 3     blood glucose (ONETOUCH ULTRA) test strip 1 strip by In Vitro route daily Use to test blood sugar one  times daily or as directed. 100 strip 3     blood glucose monitoring (NO BRAND SPECIFIED) meter device kit Use to test blood sugar one times daily or as directed. 1 kit 0     gabapentin (NEURONTIN) 300 MG capsule Take 2 capsules (600 mg) by mouth 3 times daily 540 capsule 1     glipiZIDE (GLUCOTROL XL) 5 MG 24 hr tablet Take 1 tablet (5 mg) by mouth daily 90 tablet 1     lisinopril (PRINIVIL/ZESTRIL) 5 MG tablet Take 1 tablet (5 mg) by mouth daily 90 tablet 1     metFORMIN (GLUCOPHAGE) 500 MG tablet Take 2 tablets (1,000 mg) by mouth daily (with breakfast) 180 tablet 1     ONETOUCH DELICA LANCETS 33G MISC 1 Application by In Vitro route daily Uses to test blood sugars one time daily or as directed 100 each 3       Medications and history reviewed    Physical exam:  Vitals: /86   Temp 98.2  F (36.8  C) (Temporal)   Ht 1.791 m (5' 10.5\")   Wt 124.7 kg (275 lb)   BMI 38.90 kg/m    BMI= Body mass index is 38.9 kg/m .    Constitutional: Healthy, alert, non-distressed   Head: Normo-cephalic, atraumatic, no lesions, masses or tenderness   Cardiovascular: RRR, no new murmurs, +S1, +S2 heart sounds, no clicks, rubs or gallops   Respiratory: CTAB, no rales, rhonchi or wheezing, equal chest rise, good respiratory effort   Gastrointestinal: Soft, non-tender, non distended, no rebound rigidity or guarding, no masses or hernias palpated   : Deferred  Musculoskeletal: Moves all extremities, normal  strength, no deformities noted   Skin: left face with " ~2cm mass, no erythema, no drainage.   Psychiatric: Mentation appears normal, affect appropriate   Hematologic/Lymphatic/Immunologic: Normal cervical and supraclavicular lymph nodes   Patient able to get up on table without difficulty.    Labs show:  No results found for this or any previous visit (from the past 24 hour(s)).      Assessment:     ICD-10-CM    1. Subcutaneous mass R22.9 Dermatological path order and indications     Plan: I recommend in office drainage of enlarging mass, likely cyst. We discussed the procedure in detail. We also discussed the risks, benefits, alternatives and post-procedure care. After our informed discussion we decided to proceed with in office excision. See below for description of procedure.     Estevan Morales,      PROCEDURE: excision of subcutaneous mass of face   Written consent was obtained    The left face and cheel area was prepped and appropriately anesthetized with 1% lidocaine with epinephrine. Using the usual technique, excision of subcutaneous cyst was performed. The total area excised, including lesion and margins was 3cm x 2 cm x 2 cm.  Closure was accomplished with interrupted 3-0 vicryl for the dermal layer and running subcuticular 4-0 monocryl for the skin. Total length of the incision after closure was 2 cm. An appropriate  dressing was applied.  The procedure was well tolerated and without complications. Specimen was sent to Pathology.

## 2019-06-21 NOTE — LETTER
Estevan Callejas  [address] [facility] [address]    June 26, 2019      Dear Estevan,  This letter is to inform you of the results of your pathology report from your excision.  Your pathology report was:  FINAL DIAGNOSIS:   Skin, face:   - Infundibular cyst (epidermal inclusion cyst)  This is a benign, non-concerning finding. We hope your incision is healing well.  If you have further questions please don t hesitate to call our clinic at [phone].   Sincerely,     Estevan Morales, DO

## 2019-06-21 NOTE — LETTER
6/21/2019         RE: Estevan Callejas  [address]   [place] [address]        Dear Colleague,    Thank you for referring your patient, Estevan Callejas, to the [facility]. Please see a copy of my visit note below.    Patient seen in consultation for cyst on face by Lawanda Barrera    HPI:  Patient is a 43 year old male with ~10 year history of cyst on face. He states that sometimes it swells quite large and a few times it has drained spontaneously or has been lanced. He denies any recent infection of the cyst. He denies any medication allergies. He does have DM. He had a small cyst on the right side of the face that drained in the past and not come back.    Review Of Systems    Skin: as above  Ears/Nose/Throat: negative  Respiratory: No shortness of breath, dyspnea on exertion, cough, or hemoptysis  Cardiovascular: negative  Gastrointestinal: negative  Genitourinary: negative  Musculoskeletal: negative  Neurologic: negative  Hematologic/Lymphatic/Immunologic: negative  Endocrine: negative      Past Medical History:   Diagnosis Date     Diabetes (H)        Past Surgical History:   Procedure Laterality Date     EXAM UNDER ANESTHESIA, CHANGE DRESSING (LOCATION), COMBINED N/A 1/15/2018    Procedure: COMBINED EXAM UNDER ANESTHESIA, CHANGE DRESSING (LOCATION);  Dressing Change and exam under Monitored Anesthesia Care with wound vac placement;  Surgeon: Stefania Munroe MD;  Location: [facility] OR     EXAM UNDER ANESTHESIA, CHANGE DRESSING (LOCATION), COMBINED N/A 1/18/2018    Procedure: COMBINED EXAM UNDER ANESTHESIA, CHANGE DRESSING (LOCATION);  Serial wound vac change perineal;  Surgeon: Stefania Munroe MD;  Location: [facility] OR     IRRIGATION AND DEBRIDEMENT PERINEAL, COMBINED N/A 1/13/2018    Procedure: COMBINED IRRIGATION AND DEBRIDEMENT PERINEAL;  Irrigation and Debridement of perianal abscess;  Surgeon: Stefania Munroe MD;  Location: [facility] OR     IRRIGATION AND  DEBRIDEMENT PERINEAL, COMBINED N/A 1/14/2018    Procedure: COMBINED IRRIGATION AND DEBRIDEMENT PERINEAL;  combined irrigation and debridement perineal and dressing change;  Surgeon: Stefania Munroe MD;  Location: [facility] OR     ORTHOPEDIC SURGERY       TONSILLECTOMY, ADENOIDECTOMY, COMBINED         No family history on file.    Social History     Socioeconomic History     Marital status: Single     Spouse name: Not on file     Number of children: Not on file     Years of education: Not on file     Highest education level: Not on file   Occupational History     Not on file   Social Needs     Financial resource strain: Not on file     Food insecurity:     Worry: Not on file     Inability: Not on file     Transportation needs:     Medical: Not on file     Non-medical: Not on file   Tobacco Use     Smoking status: Current Some Day Smoker     Types: Dip, chew, snus or snuff, Cigarettes     Smokeless tobacco: Current User   Substance and Sexual Activity     Alcohol use: Yes     Drug use: No     Sexual activity: Yes     Partners: Female     Birth control/protection: None   Lifestyle     Physical activity:     Days per week: Not on file     Minutes per session: Not on file     Stress: Not on file   Relationships     Social connections:     Talks on phone: Not on file     Gets together: Not on file     Attends Mandaeism service: Not on file     Active member of club or organization: Not on file     Attends meetings of clubs or organizations: Not on file     Relationship status: Not on file     Intimate partner violence:     Fear of current or ex partner: Not on file     Emotionally abused: Not on file     Physically abused: Not on file     Forced sexual activity: Not on file   Other Topics Concern     Parent/sibling w/ CABG, MI or angioplasty before 65F 55M? Not Asked   Social History Narrative     Not on file       Current Outpatient Medications   Medication Sig Dispense Refill     acetaminophen (TYLENOL) 325 MG  "tablet Take 2 tablets (650 mg) by mouth every 4 hours as needed for other (multimodal surgical pain management along with NSAIDS and opioid medication as indicated based on pain control and physical function.) 100 tablet      Applicators (COTTON SWABS) SWAB 1 each as needed 100 each 3     aspirin 81 MG tablet Take 1 tablet (81 mg) by mouth daily 90 tablet 3     atorvastatin (LIPITOR) 20 MG tablet Take 1 tablet (20 mg) by mouth daily 90 tablet 3     Bisacodyl (LAXATIVE PO)        blood glucose (NO BRAND SPECIFIED) lancets standard Use to test blood sugar one times daily or as directed. 100 each 3     blood glucose (ONETOUCH ULTRA) test strip 1 strip by In Vitro route daily Use to test blood sugar one  times daily or as directed. 100 strip 3     blood glucose monitoring (NO BRAND SPECIFIED) meter device kit Use to test blood sugar one times daily or as directed. 1 kit 0     gabapentin (NEURONTIN) 300 MG capsule Take 2 capsules (600 mg) by mouth 3 times daily 540 capsule 1     glipiZIDE (GLUCOTROL XL) 5 MG 24 hr tablet Take 1 tablet (5 mg) by mouth daily 90 tablet 1     lisinopril (PRINIVIL/ZESTRIL) 5 MG tablet Take 1 tablet (5 mg) by mouth daily 90 tablet 1     metFORMIN (GLUCOPHAGE) 500 MG tablet Take 2 tablets (1,000 mg) by mouth daily (with breakfast) 180 tablet 1     ONETOUCH DELICA LANCETS 33G MISC 1 Application by In Vitro route daily Uses to test blood sugars one time daily or as directed 100 each 3       Medications and history reviewed    Physical exam:  Vitals: /86   Temp 98.2  F (36.8  C) (Temporal)   Ht 1.791 m (5' 10.5\")   Wt 124.7 kg (275 lb)   BMI 38.90 kg/m     BMI= Body mass index is 38.9 kg/m .    Constitutional: Healthy, alert, non-distressed   Head: Normo-cephalic, atraumatic, no lesions, masses or tenderness   Cardiovascular: RRR, no new murmurs, +S1, +S2 heart sounds, no clicks, rubs or gallops   Respiratory: CTAB, no rales, rhonchi or wheezing, equal chest rise, good respiratory effort "   Gastrointestinal: Soft, non-tender, non distended, no rebound rigidity or guarding, no masses or hernias palpated   : Deferred  Musculoskeletal: Moves all extremities, normal  strength, no deformities noted   Skin: left face with ~2cm mass, no erythema, no drainage.   Psychiatric: Mentation appears normal, affect appropriate   Hematologic/Lymphatic/Immunologic: Normal cervical and supraclavicular lymph nodes   Patient able to get up on table without difficulty.    Labs show:  No results found for this or any previous visit (from the past 24 hour(s)).      Assessment:     ICD-10-CM    1. Subcutaneous mass R22.9 Dermatological path order and indications     Plan: I recommend in office drainage of enlarging mass, likely cyst. We discussed the procedure in detail. We also discussed the risks, benefits, alternatives and post-procedure care. After our informed discussion we decided to proceed with in office excision. See below for description of procedure.     Estevan Morales DO     PROCEDURE: excision of subcutaneous mass of face   Written consent was obtained    The left face and cheel area was prepped and appropriately anesthetized with 1% lidocaine with epinephrine. Using the usual technique, excision of subcutaneous cyst was performed. The total area excised, including lesion and margins was 3cm x 2 cm x 2 cm.  Closure was accomplished with interrupted 3-0 vicryl for the dermal layer and running subcuticular 4-0 monocryl for the skin. Total length of the incision after closure was 2 cm. An appropriate  dressing was applied.  The procedure was well tolerated and without complications. Specimen was sent to Pathology.          Again, thank you for allowing me to participate in the care of your patient.        Sincerely,        Estevan Morales DO

## 2019-06-22 DIAGNOSIS — E11.9 TYPE 2 DIABETES MELLITUS WITHOUT COMPLICATION, WITHOUT LONG-TERM CURRENT USE OF INSULIN (H): ICD-10-CM

## 2019-06-24 RX ORDER — LISINOPRIL 2.5 MG/1
TABLET ORAL
Qty: 90 TABLET | Refills: 3 | Status: SHIPPED | OUTPATIENT
Start: 2019-06-24 | End: 2020-07-08

## 2019-06-24 NOTE — TELEPHONE ENCOUNTER
"Requested Prescriptions   Pending Prescriptions Disp Refills     lisinopril (PRINIVIL/ZESTRIL) 2.5 MG tablet [Pharmacy Med Name: LISINOPRIL 2.5 MG TABLET] 90 tablet 3     Sig: TAKE ONE TABLET BY MOUTH once daily       ACE Inhibitors (Including Combos) Protocol Passed - 6/22/2019 10:32 AM        Passed - Blood pressure under 140/90 in past 12 months     BP Readings from Last 3 Encounters:   06/21/19 130/86   06/13/19 128/82   03/22/19 136/88           Passed - Recent (12 mo) or future (30 days) visit within the authorizing provider's specialty     Patient had office visit in the last 12 months or has a visit in the next 30 days with authorizing provider or within the authorizing provider's specialty.  See \"Patient Info\" tab in inbasket, or \"Choose Columns\" in Meds & Orders section of the refill encounter.            Passed - Medication is active on med list        Passed - Patient is age 18 or older        Passed - Normal serum creatinine on file in past 12 months     Recent Labs   Lab Test 03/22/19  1436 11/05/18  1220   CR 0.76  --    CREAT  --  0.6*           Passed - Normal serum potassium on file in past 12 months     Recent Labs   Lab Test 03/22/19  1436   POTASSIUM 4.4               Last OV: 03/22/2019    Prescription approved per [facility] Refill Protocol.    Levi Dang, RN, BSN    "

## 2019-06-25 LAB — COPATH REPORT: NORMAL

## 2019-07-07 DIAGNOSIS — E11.8 TYPE 2 DIABETES MELLITUS WITH COMPLICATION, WITHOUT LONG-TERM CURRENT USE OF INSULIN (H): ICD-10-CM

## 2019-07-08 RX ORDER — ATORVASTATIN CALCIUM 20 MG/1
TABLET, FILM COATED ORAL
Qty: 90 TABLET | Refills: 2 | Status: SHIPPED | OUTPATIENT
Start: 2019-07-08 | End: 2020-04-14

## 2019-07-08 NOTE — TELEPHONE ENCOUNTER
Pending Prescriptions:                       Disp   Refills    atorvastatin (LIPITOR) 20 MG tablet [Phar*90 tab*3            Sig: Take 1 tablet by mouth daily    Prescription approved per [facility] Refill Protocol.    Laurie Macdonald, RN, BSN

## 2019-07-16 NOTE — PROGRESS NOTES
Subjective     Estevan Callejas is a 43 year old male who presents to clinic today for the following health issues:      History of Present Illness        Diabetes:   He presents for follow up of diabetes.  He is checking home blood glucose one time daily. He checks blood glucose before meals.  Blood glucose is never over 200 and never under 70. When his blood glucose is low, the patient is asymptomatic for confusion, blurred vision, lethargy and reports not feeling dizzy, shaky, or weak.  He has no concerns regarding his diabetes at this time.  He is not experiencing numbness or burning in feet, excessive thirst, blurry vision, weight changes or redness, sores or blisters on feet. The patient has had a diabetic eye exam in the last 12 months.     Diabetes Management Resources    He eats 0-1 servings of fruits and vegetables daily.He consumes 0 sweetened beverage(s) daily.He is missing 2 dose(s) of medications per week.       -------------------------------------    Patient Active Problem List   Diagnosis     Peripheral neuropathy - near S2 dermatome     Type 2 diabetes, HbA1c goal < 7% (H)     Low back pain     Morbid obesity (H)     Perineal abscess     Epidermal cyst of face     Tobacco use disorder     Past Surgical History:   Procedure Laterality Date     EXAM UNDER ANESTHESIA, CHANGE DRESSING (LOCATION), COMBINED N/A 1/15/2018    Procedure: COMBINED EXAM UNDER ANESTHESIA, CHANGE DRESSING (LOCATION);  Dressing Change and exam under Monitored Anesthesia Care with wound vac placement;  Surgeon: Stefania Munroe MD;  Location: [facility]     EXAM UNDER ANESTHESIA, CHANGE DRESSING (LOCATION), COMBINED N/A 1/18/2018    Procedure: COMBINED EXAM UNDER ANESTHESIA, CHANGE DRESSING (LOCATION);  Serial wound vac change perineal;  Surgeon: Stefania Munroe MD;  Location: [facility] OR     IRRIGATION AND DEBRIDEMENT PERINEAL, COMBINED N/A 1/13/2018    Procedure: COMBINED IRRIGATION AND DEBRIDEMENT PERINEAL;   Irrigation and Debridement of perianal abscess;  Surgeon: Stefania Munroe MD;  Location: [facility] OR     IRRIGATION AND DEBRIDEMENT PERINEAL, COMBINED N/A 1/14/2018    Procedure: COMBINED IRRIGATION AND DEBRIDEMENT PERINEAL;  combined irrigation and debridement perineal and dressing change;  Surgeon: Stefania Munroe MD;  Location: [facility] OR     ORTHOPEDIC SURGERY       TONSILLECTOMY, ADENOIDECTOMY, COMBINED         Social History     Tobacco Use     Smoking status: Current Some Day Smoker     Types: Dip, chew, snus or snuff, Cigarettes     Smokeless tobacco: Current User   Substance Use Topics     Alcohol use: Yes     History reviewed. No pertinent family history.      Current Outpatient Medications   Medication Sig Dispense Refill     acetaminophen (TYLENOL) 325 MG tablet Take 2 tablets (650 mg) by mouth every 4 hours as needed for other (multimodal surgical pain management along with NSAIDS and opioid medication as indicated based on pain control and physical function.) 100 tablet      Applicators (COTTON SWABS) SWAB 1 each as needed 100 each 3     aspirin 81 MG tablet Take 1 tablet (81 mg) by mouth daily 90 tablet 3     atorvastatin (LIPITOR) 20 MG tablet Take 1 tablet by mouth daily 90 tablet 2     Bisacodyl (LAXATIVE PO)        blood glucose (NO BRAND SPECIFIED) lancets standard Use to test blood sugar one times daily or as directed. 100 each 3     blood glucose (ONETOUCH ULTRA) test strip 1 strip by In Vitro route daily Use to test blood sugar one  times daily or as directed. 100 strip 3     blood glucose monitoring (NO BRAND SPECIFIED) meter device kit Use to test blood sugar one times daily or as directed. 1 kit 0     gabapentin (NEURONTIN) 300 MG capsule Take 2 capsules (600 mg) by mouth 3 times daily 540 capsule 1     glipiZIDE (GLUCOTROL XL) 5 MG 24 hr tablet Take 1 tablet (5 mg) by mouth daily 90 tablet 1     lisinopril (PRINIVIL/ZESTRIL) 2.5 MG tablet TAKE ONE TABLET BY MOUTH once  "daily 90 tablet 3     metFORMIN (GLUCOPHAGE) 500 MG tablet Take 2 tablets (1,000 mg) by mouth daily (with breakfast) 180 tablet 1     ONETOUCH DELICA LANCETS 33G MISC 1 Application by In Vitro route daily Uses to test blood sugars one time daily or as directed 100 each 3     Allergies   Allergen Reactions     Seasonal Allergies      Sneezing, itchy eyes     BP Readings from Last 3 Encounters:   07/19/19 124/78   06/21/19 130/86   06/13/19 128/82    Wt Readings from Last 3 Encounters:   07/19/19 123.8 kg (273 lb)   06/21/19 124.7 kg (275 lb)   06/13/19 124.7 kg (275 lb)                    Reviewed and updated as needed this visit by Provider  Tobacco  Allergies  Meds  Problems  Med Hx  Surg Hx  Fam Hx         Review of Systems   ROS COMP: Constitutional, HEENT, cardiovascular, pulmonary, gi and gu systems are negative, except as otherwise noted.      Objective    /78 (BP Location: Left arm, Patient Position: Chair, Cuff Size: Adult Large)   Pulse 72   Temp 97.6  F (36.4  C) (Temporal)   Resp 16   Ht 1.791 m (5' 10.5\")   Wt 123.8 kg (273 lb)   SpO2 98%   BMI 38.62 kg/m    Body mass index is 38.62 kg/m .  Physical Exam   Constitutional: He appears well-developed and well-nourished.   HENT:   Head: Normocephalic and atraumatic.   Eyes: EOM are normal.   Neck: Neck supple.   Cardiovascular: Normal rate, regular rhythm, normal heart sounds and intact distal pulses. Exam reveals no gallop and no friction rub.   No murmur heard.  Pulmonary/Chest: Effort normal and breath sounds normal.   Psychiatric: He has a normal mood and affect. His behavior is normal. Judgment and thought content normal.          Diagnostic Test Results:  Labs reviewed in Epic        Assessment & Plan   Problem List Items Addressed This Visit     Peripheral neuropathy - near S2 dermatome     Continue gabapentin.           Type 2 diabetes, HbA1c goal < 7% (H) - Primary     Repeat a1c -5.9  Well controlled on metformin and " "glipizide  Declined foot exam today  Needs updated eye exam- [facility]. Pt to schedule  Continue statin , lisinopril  Recheck in 6 months         Relevant Orders    Albumin Random Urine Quantitative with Creat Ratio (Completed)    Hemoglobin A1c (Completed)      Other Visit Diagnoses     Screening for hyperlipidemia        Relevant Orders    Lipid panel reflex to direct LDL Fasting (Completed)             Tobacco Cessation:   reports that he has been smoking dip, chew, snus or snuff and cigarettes.  He uses smokeless tobacco.        BMI:   Estimated body mass index is 38.62 kg/m  as calculated from the following:    Height as of this encounter: 1.791 m (5' 10.5\").    Weight as of this encounter: 123.8 kg (273 lb).           See Patient Instructions  Return in about 6 months (around 1/19/2020).    Lawanda Barrera MD  [facility]    "

## 2019-07-19 ENCOUNTER — OFFICE VISIT (OUTPATIENT)
Dept: FAMILY MEDICINE | Facility: OTHER | Age: 44
End: 2019-07-19
Payer: COMMERCIAL

## 2019-07-19 ENCOUNTER — APPOINTMENT (OUTPATIENT)
Dept: LAB | Facility: OTHER | Age: 44
End: 2019-07-19
Payer: COMMERCIAL

## 2019-07-19 VITALS
SYSTOLIC BLOOD PRESSURE: 124 MMHG | HEART RATE: 72 BPM | BODY MASS INDEX: 38.22 KG/M2 | DIASTOLIC BLOOD PRESSURE: 78 MMHG | HEIGHT: 71 IN | TEMPERATURE: 97.6 F | WEIGHT: 273 LBS | RESPIRATION RATE: 16 BRPM | OXYGEN SATURATION: 98 %

## 2019-07-19 DIAGNOSIS — G62.9 PERIPHERAL POLYNEUROPATHY: ICD-10-CM

## 2019-07-19 DIAGNOSIS — Z13.220 SCREENING FOR HYPERLIPIDEMIA: ICD-10-CM

## 2019-07-19 DIAGNOSIS — E11.9 TYPE 2 DIABETES, HBA1C GOAL < 7% (H): Primary | ICD-10-CM

## 2019-07-19 LAB
CHOLEST SERPL-MCNC: 128 MG/DL
CREAT UR-MCNC: 167 MG/DL
HBA1C MFR BLD: 5.9 % (ref 0–5.6)
HDLC SERPL-MCNC: 67 MG/DL
LDLC SERPL CALC-MCNC: 42 MG/DL
MICROALBUMIN UR-MCNC: 11 MG/L
MICROALBUMIN/CREAT UR: 6.59 MG/G CR (ref 0–17)
NONHDLC SERPL-MCNC: 61 MG/DL
TRIGL SERPL-MCNC: 96 MG/DL

## 2019-07-19 PROCEDURE — 83036 HEMOGLOBIN GLYCOSYLATED A1C: CPT | Performed by: FAMILY MEDICINE

## 2019-07-19 PROCEDURE — 99214 OFFICE O/P EST MOD 30 MIN: CPT | Performed by: FAMILY MEDICINE

## 2019-07-19 PROCEDURE — 36415 COLL VENOUS BLD VENIPUNCTURE: CPT | Performed by: FAMILY MEDICINE

## 2019-07-19 PROCEDURE — 80061 LIPID PANEL: CPT | Performed by: FAMILY MEDICINE

## 2019-07-19 PROCEDURE — 82043 UR ALBUMIN QUANTITATIVE: CPT | Performed by: FAMILY MEDICINE

## 2019-07-19 ASSESSMENT — PAIN SCALES - GENERAL: PAINLEVEL: NO PAIN (0)

## 2019-07-19 ASSESSMENT — MIFFLIN-ST. JEOR: SCORE: 2152.51

## 2019-07-19 NOTE — ASSESSMENT & PLAN NOTE
Repeat a1c -5.9  Well controlled on metformin and glipizide  Declined foot exam today  Needs updated eye exam- [facility]. Pt to schedule  Continue statin , lisinopril  Recheck in 6 months

## 2019-07-19 NOTE — LETTER
July 22, 2019      Estevan Callejas  [address] [place] [address]        Dear Estevan,     This letter is to inform you that your recent lab testing was normal.    Results for orders placed or performed in visit on 07/19/19   Lipid panel reflex to direct LDL Fasting   Result Value Ref Range    Cholesterol 128 <200 mg/dL    Triglycerides 96 <150 mg/dL    HDL Cholesterol 67 >39 mg/dL    LDL Cholesterol Calculated 42 <100 mg/dL    Non HDL Cholesterol 61 <130 mg/dL   Albumin Random Urine Quantitative with Creat Ratio   Result Value Ref Range    Creatinine Urine 167 mg/dL    Albumin Urine mg/L 11 mg/L    Albumin Urine mg/g Cr 6.59 0 - 17 mg/g Cr   Hemoglobin A1c   Result Value Ref Range    Hemoglobin A1C 5.9 (H) 0 - 5.6 %       Please let us know if you have any further questions or concerns.    Thanks,  [facility]

## 2019-09-13 DIAGNOSIS — E11.8 TYPE 2 DIABETES MELLITUS WITH COMPLICATION, WITHOUT LONG-TERM CURRENT USE OF INSULIN (H): ICD-10-CM

## 2019-09-13 DIAGNOSIS — E11.9 TYPE 2 DIABETES MELLITUS WITHOUT COMPLICATION, WITHOUT LONG-TERM CURRENT USE OF INSULIN (H): ICD-10-CM

## 2019-09-16 RX ORDER — GABAPENTIN 300 MG/1
CAPSULE ORAL
Qty: 540 CAPSULE | Refills: 1 | Status: SHIPPED | OUTPATIENT
Start: 2019-09-16 | End: 2020-04-03

## 2019-09-16 NOTE — TELEPHONE ENCOUNTER
Gabapentin      Last Written Prescription Date:  3/22/2019  Last Fill Quantity: 540,   # refills: 1  Last Office Visit: 7/19/2019  Future Office visit:       Routing refill request to provider for review/approval because:  Drug not on the [facility], [facility] or [facility] refill protocol or controlled substance    Metformin    Prescription approved per [facility] Refill Protocol.    Laurie Macdonald, RN, BSN

## 2019-09-30 DIAGNOSIS — E11.69 TYPE 2 DIABETES MELLITUS WITH OTHER SPECIFIED COMPLICATION, WITHOUT LONG-TERM CURRENT USE OF INSULIN (H): ICD-10-CM

## 2019-09-30 RX ORDER — GLIPIZIDE 5 MG/1
TABLET, FILM COATED, EXTENDED RELEASE ORAL
Qty: 90 TABLET | Refills: 0 | Status: SHIPPED | OUTPATIENT
Start: 2019-09-30 | End: 2020-06-29

## 2019-09-30 NOTE — TELEPHONE ENCOUNTER
Pending Prescriptions:                       Disp   Refills    glipiZIDE (GLUCOTROL XL) 5 MG 24 hr table*90 tab*1            Sig: TAKE ONE TABLET BY MOUTH once daily    Prescription approved per [facility] Refill Protocol.    Laurie Macdonald, RN, BSN

## 2019-11-13 ENCOUNTER — OFFICE VISIT (OUTPATIENT)
Dept: FAMILY MEDICINE | Facility: OTHER | Age: 44
End: 2019-11-13
Payer: COMMERCIAL

## 2019-11-13 ENCOUNTER — TELEPHONE (OUTPATIENT)
Dept: FAMILY MEDICINE | Facility: OTHER | Age: 44
End: 2019-11-13

## 2019-11-13 VITALS
WEIGHT: 256.4 LBS | HEART RATE: 88 BPM | DIASTOLIC BLOOD PRESSURE: 78 MMHG | SYSTOLIC BLOOD PRESSURE: 126 MMHG | OXYGEN SATURATION: 98 % | BODY MASS INDEX: 36.27 KG/M2 | TEMPERATURE: 98.9 F | RESPIRATION RATE: 16 BRPM

## 2019-11-13 DIAGNOSIS — L08.9 LOCAL INFECTION OF SKIN AND SUBCUTANEOUS TISSUE: ICD-10-CM

## 2019-11-13 DIAGNOSIS — R21 RASH: Primary | ICD-10-CM

## 2019-11-13 DIAGNOSIS — E11.9 TYPE 2 DIABETES, HBA1C GOAL < 7% (H): ICD-10-CM

## 2019-11-13 LAB
BASOPHILS # BLD AUTO: 0 10E9/L (ref 0–0.2)
BASOPHILS NFR BLD AUTO: 0.3 %
DIFFERENTIAL METHOD BLD: ABNORMAL
EOSINOPHIL # BLD AUTO: 0.6 10E9/L (ref 0–0.7)
EOSINOPHIL NFR BLD AUTO: 4.1 %
ERYTHROCYTE [DISTWIDTH] IN BLOOD BY AUTOMATED COUNT: 12.8 % (ref 10–15)
HCT VFR BLD AUTO: 45.7 % (ref 40–53)
HGB BLD-MCNC: 15.3 G/DL (ref 13.3–17.7)
LYMPHOCYTES # BLD AUTO: 2.9 10E9/L (ref 0.8–5.3)
LYMPHOCYTES NFR BLD AUTO: 21.7 %
MCH RBC QN AUTO: 31.7 PG (ref 26.5–33)
MCHC RBC AUTO-ENTMCNC: 33.5 G/DL (ref 31.5–36.5)
MCV RBC AUTO: 95 FL (ref 78–100)
MONOCYTES # BLD AUTO: 0.9 10E9/L (ref 0–1.3)
MONOCYTES NFR BLD AUTO: 7.1 %
NEUTROPHILS # BLD AUTO: 8.9 10E9/L (ref 1.6–8.3)
NEUTROPHILS NFR BLD AUTO: 66.8 %
PLATELET # BLD AUTO: 203 10E9/L (ref 150–450)
RBC # BLD AUTO: 4.83 10E12/L (ref 4.4–5.9)
WBC # BLD AUTO: 13.3 10E9/L (ref 4–11)

## 2019-11-13 PROCEDURE — 85025 COMPLETE CBC W/AUTO DIFF WBC: CPT | Performed by: NURSE PRACTITIONER

## 2019-11-13 PROCEDURE — 36415 COLL VENOUS BLD VENIPUNCTURE: CPT | Performed by: NURSE PRACTITIONER

## 2019-11-13 PROCEDURE — 99213 OFFICE O/P EST LOW 20 MIN: CPT | Performed by: NURSE PRACTITIONER

## 2019-11-13 RX ORDER — CETIRIZINE HYDROCHLORIDE 10 MG/1
10 TABLET ORAL 2 TIMES DAILY
Qty: 10 TABLET | Refills: 0 | Status: SHIPPED | OUTPATIENT
Start: 2019-11-13 | End: 2021-02-26

## 2019-11-13 RX ORDER — CEPHALEXIN 500 MG/1
500 CAPSULE ORAL 2 TIMES DAILY
Qty: 20 CAPSULE | Refills: 0 | Status: SHIPPED | OUTPATIENT
Start: 2019-11-13 | End: 2020-07-01

## 2019-11-13 RX ORDER — HYDROCORTISONE 2.5 %
CREAM (GRAM) TOPICAL 3 TIMES DAILY
Status: CANCELLED | OUTPATIENT
Start: 2019-11-13

## 2019-11-13 NOTE — PATIENT INSTRUCTIONS
-Please start Hydrocortisone cream three times daily to affected sites. Please use thin layer. Use for no longer than 10 days  - Start Zyrtec 10mg twice daily for 3 days then continue with once daily thereafter.   - Start Ceflex two times daily for 10 days.   - If you notice any increase in swelling, fevers, chills, worsening symptoms please go in to be evaluated.      ANDRES Hernandez CNP  Questions or concerns please feel free to send me a [employer] message or call me  Phone : [phone]

## 2019-11-13 NOTE — PROGRESS NOTES
Subjective     Estevan Callejas is a 44 year old male who presents to clinic today for the following health issues:    HPI   Rash/ Facial & Eye swelling  Onset: 2 days ago     Description:   Location: face, left eye  Character: blotchy, raised, burning, red  Itching (Pruritis): YES    Progression of Symptoms:  worsening    Accompanying Signs & Symptoms:  Fever: no   Body aches or joint pain: no   Sore throat symptoms: no   Recent cold symptoms: no     History:   Previous similar rash: no     Precipitating factors:   Exposure to similar rash: no   New exposures: none   Recent travel: no     Alleviating factors:  zyrtec    Therapies Tried and outcome: patient states he tried zyrtec but did not see any improvement.    No fever or chills  No visual changes  Underneath the eye started on Monday night, itching his eye and noticed swelling underneath his eye. Face started swelling and started turning red.     BP Readings from Last 3 Encounters:   11/13/19 126/78   07/19/19 124/78   06/21/19 130/86    Wt Readings from Last 3 Encounters:   11/13/19 116.3 kg (256 lb 6.4 oz)   07/19/19 123.8 kg (273 lb)   06/21/19 124.7 kg (275 lb)                    Reviewed and updated as needed this visit by Provider         Review of Systems   Constitutional: Negative for fever.   Eyes: Negative.    Respiratory: Negative.    Cardiovascular: Negative.             Objective    /78   Pulse 88   Temp 99  F (37.2  C) (Oral)   Resp 16   Wt 116.3 kg (256 lb 6.4 oz)   SpO2 98%   BMI 36.27 kg/m    Body mass index is 36.27 kg/m .  Physical Exam  Eyes:      Pupils: Pupils are equal, round, and reactive to light.      Comments: Left eyelid- positive for mild lid swelling and redness.    Cardiovascular:      Rate and Rhythm: Normal rate and regular rhythm.   Pulmonary:      Effort: Pulmonary effort is normal.      Breath sounds: Normal breath sounds and air entry.   Skin:     Findings: Pustular rash: ]==      Comments:   Face with mild  erythema, most specifically on the left side.   There is some demarcation along the forehead consistent with possible wind burn or sun burn.   The skin is rough and dry and to the left of the eye there are some yellow crusting area along with some yellow crusting to the left outer nose. Left eyelid swollen and mild swelling along the left cheekbone region.   Slightly warm to touch.            Diagnostic Test Results:  Results for orders placed or performed in visit on 11/13/19   CBC with platelets and differential     Status: Abnormal   Result Value Ref Range    WBC 13.3 (H) 4.0 - 11.0 10e9/L    RBC Count 4.83 4.4 - 5.9 10e12/L    Hemoglobin 15.3 13.3 - 17.7 g/dL    Hematocrit 45.7 40.0 - 53.0 %    MCV 95 78 - 100 fl    MCH 31.7 26.5 - 33.0 pg    MCHC 33.5 31.5 - 36.5 g/dL    RDW 12.8 10.0 - 15.0 %    Platelet Count 203 150 - 450 10e9/L    % Neutrophils 66.8 %    % Lymphocytes 21.7 %    % Monocytes 7.1 %    % Eosinophils 4.1 %    % Basophils 0.3 %    Absolute Neutrophil 8.9 (H) 1.6 - 8.3 10e9/L    Absolute Lymphocytes 2.9 0.8 - 5.3 10e9/L    Absolute Monocytes 0.9 0.0 - 1.3 10e9/L    Absolute Eosinophils 0.6 0.0 - 0.7 10e9/L    Absolute Basophils 0.0 0.0 - 0.2 10e9/L    Diff Method Automated Method            Assessment & Plan       ICD-10-CM    1. Type 2 diabetes, HbA1c goal < 7% (H) E11.9    2. Rash R21 cephALEXin (KEFLEX) 500 MG capsule     cetirizine (ZYRTEC) 10 MG tablet     CBC with platelets and differential   3. Local infection of skin and subcutaneous tissue L08.9 cephALEXin (KEFLEX) 500 MG capsule     cetirizine (ZYRTEC) 10 MG tablet     CBC with platelets and differential      Pleasant 43 year old with a history of DM2 presenting to clinic with concerns for a localized facial rash. He notes that he was hunting this past weekend and noticed that when he was driving home his left eye started to itch and redness spread throughout the left eye, forehead and cheeks. Has been taking some zyrtec without relief.  Patient denies any visual changes, no fevers or chills, no shortness of breath, no wheezing, no medication changes, no new soaps or lotions. Not aware of any insect bites or contact with any weeds or plants although possible.     At this time I suspect this is likely an allergic reaction with some generalized cellulitis reaction. Exam shows some erythema, swelling along the left eye. I recommend that we start Keflex twice daily for 10 days for infection coverage. I also recommend he increase his zyrtec dosing to 10mg twice daily for the next couple of days.  In addition recommend applying hydrocortisone cream three times daily to affected areas, thin layer and no longer than 10 days.     Advised if any worsening symptoms, increased swelling, fevers/chills, visual changes to go in to be evaluated in the emergency room.     Recommend OV in 2 days for close follow up.     Monitor sugars during acute infection.     The patient indicates understanding of these issues and agrees with the plan.      Patient Instructions   -Please start Hydrocortisone cream three times daily to affected sites. Please use thin layer. Use for no longer than 10 days  - Start Zyrtec 10mg twice daily for 3 days then continue with once daily thereafter.   - Start Ceflex two times daily for 10 days.   - If you notice any increase in swelling, fevers, chills, worsening symptoms please go in to be evaluated.      ANDRES Hernandez CNP  Questions or concerns please feel free to send me a [employer] message or call me  Phone : [phone]            No follow-ups on file.    ANDRES Hernandez CNP  [facility]

## 2019-11-13 NOTE — TELEPHONE ENCOUNTER
Spoke with patient.  Started Monday afternoon. Left eye started itching.  Fire red. Under eye swollen/puffy .  Breaking out on his forehead and left side of face and nose.  No changes in soaps or lotion. Advised that he should keep appointment as planned.    Next 5 appointments (look out 90 days)    Nov 13, 2019 11:40 AM CST  Office Visit with ANDRES Mccullough CNP  [facility] ([facility] [address] [place] [id number] [phone]        Charlie Vidales, RN, BSN

## 2019-11-13 NOTE — TELEPHONE ENCOUNTER
Please triage for appointment today      ANDRES Hernandez CNP  Questions or concerns please feel free to send me a [employer] message or call me  Phone : [phone]

## 2019-11-14 ENCOUNTER — HOSPITAL ENCOUNTER (EMERGENCY)
Facility: CLINIC | Age: 44
Discharge: HOME | End: 2019-11-14
Attending: EMERGENCY MEDICINE | Admitting: EMERGENCY MEDICINE
Payer: COMMERCIAL

## 2019-11-14 VITALS
WEIGHT: 265 LBS | OXYGEN SATURATION: 97 % | DIASTOLIC BLOOD PRESSURE: 91 MMHG | BODY MASS INDEX: 37.49 KG/M2 | TEMPERATURE: 98.3 F | HEART RATE: 84 BPM | SYSTOLIC BLOOD PRESSURE: 148 MMHG | RESPIRATION RATE: 16 BRPM

## 2019-11-14 DIAGNOSIS — L30.9 DERMATITIS OF FACE: ICD-10-CM

## 2019-11-14 DIAGNOSIS — L03.211 CELLULITIS DIFFUSE, FACE: ICD-10-CM

## 2019-11-14 LAB
ANION GAP SERPL CALCULATED.3IONS-SCNC: 7 MMOL/L (ref 3–14)
BASOPHILS # BLD AUTO: 0.1 10E9/L (ref 0–0.2)
BASOPHILS NFR BLD AUTO: 0.5 %
BUN SERPL-MCNC: 15 MG/DL (ref 7–30)
CALCIUM SERPL-MCNC: 8.8 MG/DL (ref 8.5–10.1)
CHLORIDE SERPL-SCNC: 109 MMOL/L (ref 94–109)
CO2 SERPL-SCNC: 26 MMOL/L (ref 20–32)
CREAT SERPL-MCNC: 0.58 MG/DL (ref 0.66–1.25)
DIFFERENTIAL METHOD BLD: ABNORMAL
EOSINOPHIL NFR BLD AUTO: 4.2 %
ERYTHROCYTE [DISTWIDTH] IN BLOOD BY AUTOMATED COUNT: 12.2 % (ref 10–15)
GFR SERPL CREATININE-BSD FRML MDRD: >90 ML/MIN/{1.73_M2}
GLUCOSE SERPL-MCNC: 151 MG/DL (ref 70–99)
HCT VFR BLD AUTO: 45.5 % (ref 40–53)
HGB BLD-MCNC: 15.2 G/DL (ref 13.3–17.7)
IMM GRANULOCYTES # BLD: 0.1 10E9/L (ref 0–0.4)
IMM GRANULOCYTES NFR BLD: 0.6 %
LYMPHOCYTES # BLD AUTO: 2 10E9/L (ref 0.8–5.3)
LYMPHOCYTES NFR BLD AUTO: 15.3 %
MCH RBC QN AUTO: 31.4 PG (ref 26.5–33)
MCHC RBC AUTO-ENTMCNC: 33.4 G/DL (ref 31.5–36.5)
MCV RBC AUTO: 94 FL (ref 78–100)
MONOCYTES # BLD AUTO: 0.7 10E9/L (ref 0–1.3)
MONOCYTES NFR BLD AUTO: 5.6 %
NEUTROPHILS # BLD AUTO: 9.6 10E9/L (ref 1.6–8.3)
NEUTROPHILS NFR BLD AUTO: 73.8 %
NRBC # BLD AUTO: 0 10*3/UL
NRBC BLD AUTO-RTO: 0 /100
PLATELET # BLD AUTO: 200 10E9/L (ref 150–450)
POTASSIUM SERPL-SCNC: 4.1 MMOL/L (ref 3.4–5.3)
RBC # BLD AUTO: 4.84 10E12/L (ref 4.4–5.9)
SODIUM SERPL-SCNC: 142 MMOL/L (ref 133–144)
WBC # BLD AUTO: 13 10E9/L (ref 4–11)

## 2019-11-14 PROCEDURE — 96375 TX/PRO/DX INJ NEW DRUG ADDON: CPT | Performed by: EMERGENCY MEDICINE

## 2019-11-14 PROCEDURE — 96365 THER/PROPH/DIAG IV INF INIT: CPT | Performed by: EMERGENCY MEDICINE

## 2019-11-14 PROCEDURE — 99284 EMERGENCY DEPT VISIT MOD MDM: CPT | Mod: 25 | Performed by: EMERGENCY MEDICINE

## 2019-11-14 PROCEDURE — 25000128 H RX IP 250 OP 636: Performed by: EMERGENCY MEDICINE

## 2019-11-14 PROCEDURE — 80048 BASIC METABOLIC PNL TOTAL CA: CPT | Performed by: EMERGENCY MEDICINE

## 2019-11-14 PROCEDURE — 85025 COMPLETE CBC W/AUTO DIFF WBC: CPT | Performed by: EMERGENCY MEDICINE

## 2019-11-14 PROCEDURE — 99284 EMERGENCY DEPT VISIT MOD MDM: CPT | Mod: Z6 | Performed by: EMERGENCY MEDICINE

## 2019-11-14 RX ORDER — TRIAMCINOLONE ACETONIDE 1 MG/G
OINTMENT TOPICAL 2 TIMES DAILY
Qty: 30 G | Refills: 0 | Status: SHIPPED | OUTPATIENT
Start: 2019-11-14 | End: 2021-02-26

## 2019-11-14 RX ORDER — CEFTRIAXONE 1 G/1
1 INJECTION, POWDER, FOR SOLUTION INTRAMUSCULAR; INTRAVENOUS ONCE
Status: COMPLETED | OUTPATIENT
Start: 2019-11-14 | End: 2019-11-14

## 2019-11-14 RX ORDER — DEXAMETHASONE SODIUM PHOSPHATE 10 MG/ML
10 INJECTION, SOLUTION INTRAMUSCULAR; INTRAVENOUS ONCE
Status: COMPLETED | OUTPATIENT
Start: 2019-11-14 | End: 2019-11-14

## 2019-11-14 RX ADMIN — DEXAMETHASONE SODIUM PHOSPHATE 10 MG: 10 INJECTION, SOLUTION INTRAMUSCULAR; INTRAVENOUS at 10:03

## 2019-11-14 RX ADMIN — CEFTRIAXONE SODIUM 1 G: 1 INJECTION, POWDER, FOR SOLUTION INTRAMUSCULAR; INTRAVENOUS at 10:16

## 2019-11-14 NOTE — ED PROVIDER NOTES
"  History     Chief Complaint   Patient presents with     Eye Problem     HPI  History per patient and medical records    This is a 44-year-old male with history of type 2 diabetes, obesity, Marley's gangrene, presenting with concerns about eyelid swelling.  Patient was in the woods hunting all weekend.  On Monday, 3 days ago, he was driving home and noted some itching by his left eye.  Next day, the area of itching became more diffuse with redness around the eye, forehead, cheek.  He was seen in clinic yesterday and diagnosed with a likely cellulitis.  He was placed on Keflex and Zyrtec.  It was recommended that he use hydrocortisone cream sparingly to the face.  Patient bought some anti-itch cream to use last night but notes that it \"burned his face\".  He has taken 3 doses of antibiotics.  This morning, he noted increased swelling and puffiness to his face, specifically the eyelids bilaterally.  He denies any fevers or chills.  He is eating drinking normally.  Normal bowel and bladder.  He denies chest pain, shortness of breath.  No other areas of skin changes or rash.    Allergies:  Allergies   Allergen Reactions     Seasonal Allergies      Sneezing, itchy eyes       Problem List:    Patient Active Problem List    Diagnosis Date Noted     Tobacco use disorder 07/01/2018     Priority: Medium     Epidermal cyst of face 01/30/2018     Priority: Medium     Perineal abscess 01/13/2018     Priority: Medium     Morbid obesity (H) 06/12/2017     Priority: Medium     Low back pain 03/06/2015     Priority: Medium     Type 2 diabetes, HbA1c goal < 7% (H) 02/25/2015     Priority: Medium     Peripheral neuropathy - near S2 dermatome 02/18/2015     Priority: Medium        Past Medical History:    Past Medical History:   Diagnosis Date     Diabetes (H)        Past Surgical History:    Past Surgical History:   Procedure Laterality Date     EXAM UNDER ANESTHESIA, CHANGE DRESSING (LOCATION), COMBINED N/A 1/15/2018    Procedure: " COMBINED EXAM UNDER ANESTHESIA, CHANGE DRESSING (LOCATION);  Dressing Change and exam under Monitored Anesthesia Care with wound vac placement;  Surgeon: Stefania Munroe MD;  Location: [facility] OR     EXAM UNDER ANESTHESIA, CHANGE DRESSING (LOCATION), COMBINED N/A 1/18/2018    Procedure: COMBINED EXAM UNDER ANESTHESIA, CHANGE DRESSING (LOCATION);  Serial wound vac change perineal;  Surgeon: Stefania Munroe MD;  Location: [facility] OR     IRRIGATION AND DEBRIDEMENT PERINEAL, COMBINED N/A 1/13/2018    Procedure: COMBINED IRRIGATION AND DEBRIDEMENT PERINEAL;  Irrigation and Debridement of perianal abscess;  Surgeon: Stefania Munroe MD;  Location: [facility] OR     IRRIGATION AND DEBRIDEMENT PERINEAL, COMBINED N/A 1/14/2018    Procedure: COMBINED IRRIGATION AND DEBRIDEMENT PERINEAL;  combined irrigation and debridement perineal and dressing change;  Surgeon: Stefania Munroe MD;  Location: [facility] OR     ORTHOPEDIC SURGERY       TONSILLECTOMY, ADENOIDECTOMY, COMBINED         Family History:    No family history on file.    Social History:  Marital Status:  Single [1]  Social History     Tobacco Use     Smoking status: Current Some Day Smoker     Types: Dip, chew, snus or snuff, Cigarettes     Smokeless tobacco: Current User   Substance Use Topics     Alcohol use: Yes     Drug use: No        Medications:    triamcinolone (KENALOG) 0.1 % external ointment  acetaminophen (TYLENOL) 325 MG tablet  Applicators (COTTON SWABS) SWAB  aspirin 81 MG tablet  atorvastatin (LIPITOR) 20 MG tablet  Bisacodyl (LAXATIVE PO)  blood glucose (NO BRAND SPECIFIED) lancets standard  blood glucose (ONETOUCH ULTRA) test strip  blood glucose monitoring (NO BRAND SPECIFIED) meter device kit  cephALEXin (KEFLEX) 500 MG capsule  cetirizine (ZYRTEC) 10 MG tablet  gabapentin (NEURONTIN) 300 MG capsule  glipiZIDE (GLUCOTROL XL) 5 MG 24 hr tablet  lisinopril (PRINIVIL/ZESTRIL) 2.5 MG tablet  metFORMIN (GLUCOPHAGE) 500 MG  tablet  DREWTOUCH DELICA LANCETS 33G MISC        Review of Systems   All other ROS reviewed and are negative or non-contributory except as stated in HPI.     Physical Exam   BP: (!) 153/93  Pulse: 86  Temp: 98.3  F (36.8  C)  Resp: 16  Weight: 120.2 kg (265 lb)  SpO2: 97 %      Physical Exam  Vitals signs and nursing note reviewed.   Constitutional:       Comments: Pleasant, healthy-appearing male sitting in the bed.  He has a de complexion and some obvious eyelid swelling.   HENT:      Head: Normocephalic.      Nose: Nose normal.      Mouth/Throat:      Mouth: Mucous membranes are moist.      Pharynx: Oropharynx is clear.   Eyes:      Extraocular Movements: Extraocular movements intact.      Conjunctiva/sclera: Conjunctivae normal.      Comments: Bilateral lid swelling/edema   Neck:      Musculoskeletal: Normal range of motion and neck supple.   Cardiovascular:      Rate and Rhythm: Normal rate and regular rhythm.      Pulses: Normal pulses.      Heart sounds: Normal heart sounds.   Pulmonary:      Effort: Pulmonary effort is normal.      Breath sounds: Wheezing present.   Musculoskeletal: Normal range of motion.   Skin:     General: Skin is warm and dry.      Comments: Patient has erythema of the whole face.  There is a straight line on his forehead of demarcation, possibly sunburn.  The skin is rough and dry and to the left of the eye there are some yellow crusting area along with some yellow crusting to the left outer nose.  Both eyelids are swollen.  The redness is more on the left than the right but it is bilateral.  Somewhat warm to the touch.  Please see pictures.   Neurological:      General: No focal deficit present.      Mental Status: He is alert and oriented to person, place, and time.   Psychiatric:         Mood and Affect: Mood normal.         Behavior: Behavior normal.                     ED Course (with Medical Decision Making)    Pt seen and examined by me.  RN and EPIC notes reviewed.       Patient with erythema, swelling, some yellow crusting/weeping on his face.  Recently diagnosed with cellulitis.  I think this is likely a contact dermatitis with some possible underlying early infection.  Yesterday in clinic he was started on Keflex and had a slightly elevated white blood cell count.  He had a significant infection with Marley's gangrene and is a diabetic in the past so we would like to be quite proactive to prevent any worsening.    I am going to place an IV, check labs.  I will give him 1 dose of ceftriaxone.  I am also going to give him a dose of dexamethasone as I think that some of this is due to hypersensitivity, something like a poison ivy reaction.  With the bilateral findings I do not think this is shingles although there is a chance it is red.    In any case, patient tolerated the ceftriaxone well.  I am going to recommend using CeraVe cream.  He can mix this with a little bit of some triamcinolone ointment if needed for itch.  He can continue the Keflex and his Zyrtec as needed.  He has an appointment tomorrow for recheck which I think is appropriate.  He could try some cool cloths over the areas of itching.  Return at anytime for worsening, changes or concerns.        Procedures    Results for orders placed or performed during the hospital encounter of 11/14/19 (from the past 24 hour(s))   CBC with platelets differential   Result Value Ref Range    WBC 13.0 (H) 4.0 - 11.0 10e9/L    RBC Count 4.84 4.4 - 5.9 10e12/L    Hemoglobin 15.2 13.3 - 17.7 g/dL    Hematocrit 45.5 40.0 - 53.0 %    MCV 94 78 - 100 fl    MCH 31.4 26.5 - 33.0 pg    MCHC 33.4 31.5 - 36.5 g/dL    RDW 12.2 10.0 - 15.0 %    Platelet Count 200 150 - 450 10e9/L    Diff Method Automated Method     % Neutrophils 73.8 %    % Lymphocytes 15.3 %    % Monocytes 5.6 %    % Eosinophils 4.2 %    % Basophils 0.5 %    % Immature Granulocytes 0.6 %    Nucleated RBCs 0 0 /100    Absolute Neutrophil 9.6 (H) 1.6 - 8.3 10e9/L     Absolute Lymphocytes 2.0 0.8 - 5.3 10e9/L    Absolute Monocytes 0.7 0.0 - 1.3 10e9/L    Absolute Basophils 0.1 0.0 - 0.2 10e9/L    Abs Immature Granulocytes 0.1 0 - 0.4 10e9/L    Absolute Nucleated RBC 0.0    Basic metabolic panel   Result Value Ref Range    Sodium 142 133 - 144 mmol/L    Potassium 4.1 3.4 - 5.3 mmol/L    Chloride 109 94 - 109 mmol/L    Carbon Dioxide 26 20 - 32 mmol/L    Anion Gap 7 3 - 14 mmol/L    Glucose 151 (H) 70 - 99 mg/dL    Urea Nitrogen 15 7 - 30 mg/dL    Creatinine 0.58 (L) 0.66 - 1.25 mg/dL    GFR Estimate >90 >60 mL/min/[1.73_m2]    GFR Estimate If Black >90 >60 mL/min/[1.73_m2]    Calcium 8.8 8.5 - 10.1 mg/dL       Medications   cefTRIAXone (ROCEPHIN) 1 g vial to attach to  mL bag for ADULTS or NS 50 mL bag for PEDS (0 g Intravenous Stopped 11/14/19 1057)   dexamethasone PF (DECADRON) injection 10 mg (10 mg Intravenous Given 11/14/19 1003)       Assessments & Plan    I have reviewed the findings, diagnosis, plan and need for follow up with the patient.    Discharge Medication List as of 11/14/2019 11:01 AM          Final diagnoses:   Cellulitis diffuse, face   Dermatitis of face     Disposition: Patient discharged home in stable condition.  Plan as above.  Return for concerns.     Note: Chart documentation done in part with Dragon Voice Recognition software. Although reviewed after completion, some word and grammatical errors may remain.     11/14/2019   [facility] EMERGENCY DEPARTMENT     Felicity Gunter MD  11/14/19 1074

## 2019-11-14 NOTE — DISCHARGE INSTRUCTIONS
I recommend that you use a very thick, lubricating cream on your face such as CeraVe.  You can mix this with the steroid cream if desired.    Continue your antibiotics as prescribed.  Next dose either tonight or tomorrow morning.  I recommend eating yogurt or taking probiotics while on antibiotics.    The steroid should stay in your system for the next 2 to 3 days and will hopefully decrease a lot of the inflammation.  You can use cool cloths/cold packs to the face for some of the burning pain.  This may also decrease some of the inflammation.    Okay to continue Zyrtec as needed for itching.    I would like you to be seen again tomorrow for a recheck to be sure this is improving.  I am trying to avoid any hospitalization or further IV antibiotics.    Return for worsening, changes or concerns.    I hope that you heal quickly!!

## 2019-11-14 NOTE — ED AVS SNAPSHOT
[facility] Emergency Department  [address] [place] [address]  Phone:  [phone]  Fax:  [phone]                                    Estevan Callejas   MRN: [id number]    Department:  [facility] Emergency Department   Date of Visit:  11/14/2019           After Visit Summary Signature Page    I have received my discharge instructions, and my questions have been answered. I have discussed any challenges I see with this plan with the nurse or doctor.    ..........................................................................................................................................  Patient/Patient Representative Signature      ..........................................................................................................................................  Patient Representative Print Name and Relationship to Patient    ..................................................               ................................................  Date                                   Time    ..........................................................................................................................................  Reviewed by Signature/Title    ...................................................              ..............................................  Date                                               Time          22EPIC Rev 08/18

## 2019-11-15 ENCOUNTER — TELEPHONE (OUTPATIENT)
Dept: FAMILY MEDICINE | Facility: OTHER | Age: 44
End: 2019-11-15

## 2019-11-15 NOTE — TELEPHONE ENCOUNTER
Called patient per provider request to inquire how patient's facial swelling and infection was doing after ED visit yesterday and he did not show for his OV today..    Phone call went to .    Left  asking patient to call back.    Levi Dang RN, BSN

## 2019-11-25 ASSESSMENT — ENCOUNTER SYMPTOMS
CARDIOVASCULAR NEGATIVE: 1
RESPIRATORY NEGATIVE: 1
FEVER: 0
EYES NEGATIVE: 1

## 2020-01-01 NOTE — PROGRESS NOTES
Patient comes to wound clinic for wound assessment per request of dr. Munroe. 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 Previous treatment includes: wound vac MW  This is treatment week:1    Objective findings:      Location: midline posterior scrotum and perineum     Wound measured.: 8.0 x 3 x 5 cm, Full thickness.    Deeper pocket in the lower posterior wall bringing total depth to 5 cm in that location    Wound description: no sign of infection    Tenderness:sometimes    Odor: none     much less inflammation    Drainage is moderate,      Wound Base: moist and granulation     Palpation of the wound bed:  normal    Periwound Skin: intact      Color: normal and consistent with surrounding tissue     Subjective finding:     Patient is assessed for discomfort which is: minimal    Today's status of the wound: improving    Treatment: Wound cleanses with technicare NS soln, rinsed with NS, Patient is assessed for discomfort, which is minimal to moderate, and understanding of wound vac change procedure. Wound vac removed Wound Vac placement: Sterile gloves are donned.Wound is repacked with black foam and covered with adhesive film. 2 cm hole hole is cut in the adhesive film on top of which suction device is placed. Amount of sponges used are noted to be 2 . Vacuum pump is turned on  continuous suction at 125 mmHg and seal is confirmed. Instructions and warnings reiterated.       PLAN: Dressing changes 3 times/week in clinic. No changes to treatment at this time       Pt received the following instructions:    If wound vac fails and wound edges can not be sealed remove the vac and pack wound with moist gauze and abd pad. Signs and symptoms of infection taught.  Patient needs to be seen 2-3 days.   Treated and follow-up appointment made.    Dr Rowan was available for supervision of  care if needed or if questions should arise regarding plan of care. Patrica Johnson RN CWON         Statement Selected

## 2020-03-03 ENCOUNTER — TELEPHONE (OUTPATIENT)
Dept: FAMILY MEDICINE | Facility: OTHER | Age: 45
End: 2020-03-03

## 2020-03-03 NOTE — LETTER
[address]   [facility] [address]  Phone: [phone]  March 3, 2020      Estevan Callejas  [address] [facility] [address]      Dear Estevan,    We care about your health and have reviewed your health plan including your medical conditions, medications, and lab results.  Based on this review, it is recommended that you follow up regarding the following health topic(s):  -Diabetes  -Wellness (Physical) Visit     We recommend you take the following action(s):  -schedule a WELLNESS (Physical) APPOINTMENT.  We will perform the following labs: A1c.     Please call us at the [facility] - [phone] (or use [employer]) to address the above recommendations.     Thank you for trusting [facility] and we appreciate the opportunity to serve you.  We look forward to supporting your healthcare needs in the future.    Healthy Regards,    Your Health Care Team  [facility]

## 2020-03-03 NOTE — TELEPHONE ENCOUNTER
Summary:    Patient is due/failing the following:   Eye exam, BP check,A1C and PHYSICAL    Action needed:   Patient needs office visit for Physical.    Type of outreach:    Sent letter.  Phone number listed no longer in service.   Questions for provider review:    None                                                                                                                                    Marcela Steel CMA       Chart routed to Care Team .          Panel Management Review      Patient has the following on his problem list:     Diabetes    ASA: Passed    Last A1C  Lab Results   Component Value Date    A1C 5.9 07/19/2019    A1C 6.4 03/22/2019    A1C 5.7 06/29/2018    A1C 6.7 01/30/2018    A1C 5.8 06/12/2017     A1C tested: Passed    Last LDL:    Lab Results   Component Value Date    CHOL 128 07/19/2019     Lab Results   Component Value Date    HDL 67 07/19/2019     Lab Results   Component Value Date    LDL 42 07/19/2019     Lab Results   Component Value Date    TRIG 96 07/19/2019     Lab Results   Component Value Date    CHOLHDLRATIO 2.4 03/13/2015     Lab Results   Component Value Date    NHDL 61 07/19/2019       Is the patient on a Statin? YES             Is the patient on Aspirin? YES    Medications     HMG CoA Reductase Inhibitors     atorvastatin (LIPITOR) 20 MG tablet       Salicylates     aspirin 81 MG tablet             Last three blood pressure readings:  BP Readings from Last 3 Encounters:   11/14/19 (!) 148/91   11/13/19 126/78   07/19/19 124/78          Tobacco History:     History   Smoking Status     Current Some Day Smoker     Types: Dip, chew, snus or snuff, Cigarettes   Smokeless Tobacco     Current User           Composite cancer screening  Chart review shows that this patient is due/due soon for the following None

## 2020-04-01 DIAGNOSIS — E11.8 TYPE 2 DIABETES MELLITUS WITH COMPLICATION, WITHOUT LONG-TERM CURRENT USE OF INSULIN (H): ICD-10-CM

## 2020-04-01 NOTE — TELEPHONE ENCOUNTER
KATHRINE REFILL: Medication is being filled for 90 day supply only due to: due for labs      Please call and help schedule.

## 2020-04-03 DIAGNOSIS — E11.9 TYPE 2 DIABETES MELLITUS WITHOUT COMPLICATION, WITHOUT LONG-TERM CURRENT USE OF INSULIN (H): ICD-10-CM

## 2020-04-03 RX ORDER — GABAPENTIN 300 MG/1
CAPSULE ORAL
Qty: 540 CAPSULE | Refills: 0 | Status: SHIPPED | OUTPATIENT
Start: 2020-04-03 | End: 2020-07-08

## 2020-04-03 NOTE — TELEPHONE ENCOUNTER
Pending Prescriptions:                       Disp   Refills    gabapentin (NEURONTIN) 300 MG capsule [Pha*540 ca*1        Sig: TAKE TWO CAPSULES (600 MG) BY MOUTH THREE TIMES DAILY    Last Written Prescription Date:  9/19/2019  Last Fill Quantity: 540,  # refills: 1   Last office visit: 11/13/2019 with prescribing provider:     Future Office Visit:      Routing refill request to provider for review/approval because:  Drug not on the [facility] refill protocol     Laurie Macdonald, MSN, RN

## 2020-04-09 ENCOUNTER — TELEPHONE (OUTPATIENT)
Dept: FAMILY MEDICINE | Facility: OTHER | Age: 45
End: 2020-04-09

## 2020-04-09 DIAGNOSIS — E11.9 TYPE 2 DIABETES, HBA1C GOAL < 7% (H): Primary | ICD-10-CM

## 2020-04-09 NOTE — TELEPHONE ENCOUNTER
Message from Pharmacy- May we get an Rx order for a one touch ultra 2 meter? Patient has supplies at home, but his meter is at work- closed due to covid. Thanks--- Pharmacy pended.

## 2020-04-14 DIAGNOSIS — E11.8 TYPE 2 DIABETES MELLITUS WITH COMPLICATION, WITHOUT LONG-TERM CURRENT USE OF INSULIN (H): ICD-10-CM

## 2020-04-14 DIAGNOSIS — E11.9 TYPE 2 DIABETES MELLITUS WITHOUT COMPLICATION, WITHOUT LONG-TERM CURRENT USE OF INSULIN (H): ICD-10-CM

## 2020-04-14 RX ORDER — ATORVASTATIN CALCIUM 20 MG/1
TABLET, FILM COATED ORAL
Qty: 90 TABLET | Refills: 0 | Status: SHIPPED | OUTPATIENT
Start: 2020-04-14 | End: 2020-07-14

## 2020-04-14 RX ORDER — LANCETS 33 GAUGE
EACH MISCELLANEOUS
Qty: 100 EACH | Refills: 1 | Status: SHIPPED | OUTPATIENT
Start: 2020-04-14 | End: 2021-01-04

## 2020-04-14 NOTE — TELEPHONE ENCOUNTER
Pending Prescriptions:                       Disp   Refills    atorvastatin (LIPITOR) 20 MG tablet [Phar*90 tab*2            Sig: Take 1 tablet by mouth daily    ONETOUCH ULTRA test strip [Pharmacy Med N*100 st*3            Sig: Use to test blood glucose 1 time daily or as           directed.    Lancets (ONETOUCH DELICA PLUS [id number]) *                    Sig: Use to test blood glucose 1 time daily or as           directed.    Prescription approved per [facility] Refill Protocol.    Laurie Macdonald, MSN, RN

## 2020-06-18 DIAGNOSIS — E11.69 TYPE 2 DIABETES MELLITUS WITH OTHER SPECIFIED COMPLICATION, WITHOUT LONG-TERM CURRENT USE OF INSULIN (H): ICD-10-CM

## 2020-06-18 RX ORDER — GLIPIZIDE 5 MG/1
TABLET, FILM COATED, EXTENDED RELEASE ORAL
Qty: 90 TABLET | Refills: 0 | OUTPATIENT
Start: 2020-06-18

## 2020-06-18 NOTE — TELEPHONE ENCOUNTER
Pending Prescriptions:                       Disp   Refills    glipiZIDE (GLUCOTROL XL) 5 MG 24 hr tablet*90 tab*0        Sig: TAKE ONE TABLET BY MOUTH once daily    Routing refill request to provider for review/approval because:  Labs out of range:

## 2020-06-19 NOTE — TELEPHONE ENCOUNTER
Number on file is not accepting calls. It does not appear he is active on [employer] either.     Magdalena Aguirre, RN, BSN

## 2020-06-26 DIAGNOSIS — E11.69 TYPE 2 DIABETES MELLITUS WITH OTHER SPECIFIED COMPLICATION, WITHOUT LONG-TERM CURRENT USE OF INSULIN (H): ICD-10-CM

## 2020-06-29 RX ORDER — GLIPIZIDE 5 MG/1
TABLET, FILM COATED, EXTENDED RELEASE ORAL
Qty: 30 TABLET | Refills: 0 | Status: SHIPPED | OUTPATIENT
Start: 2020-06-29 | End: 2020-07-08

## 2020-06-29 NOTE — TELEPHONE ENCOUNTER
Pending Prescriptions:                       Disp   Refills    glipiZIDE (GLUCOTROL XL) 5 MG 24 hr table*90 tab*0            Sig: TAKE ONE TABLET BY MOUTH once daily    Routing refill request to provider for review/approval because:  Keshia given x1 and patient did not follow up, please advise  See 6/18/20 refill encounter. No active on [employer] and number on file does not accept calls    Laurie Macdonald, MSN, RN

## 2020-06-30 NOTE — TELEPHONE ENCOUNTER
Patient scheduled on   Next 5 appointments (look out 90 days)    Jul 02, 2020  3:40 PM CDT  Office Visit with Lawanda Barrera MD  [facility] [address] [address]  [phone]        Marcela Steel CMA

## 2020-07-03 NOTE — PROGRESS NOTES
"Subjective     Estevan Callejas is a 44 year old male who presents to clinic today for the following health issues:    HPI       {SUPERLIST (Optional):605786}  {additonal problems for provider to add (Optional):708226}    {HIST REVIEW/ LINKS 2 (Optional):272865}    Reviewed and updated as needed this visit by Provider         Review of Systems   {ROS COMP (Optional):011782}      Objective    There were no vitals taken for this visit.  There is no height or weight on file to calculate BMI.  Physical Exam   {Exam List (Optional):571522}    {Diagnostic Test Results (Optional):557467::\"Diagnostic Test Results:\",\"Labs reviewed in Epic\"}        {PROVIDER CHARTING PREFERENCE:531534}    "

## 2020-07-06 DIAGNOSIS — E11.8 TYPE 2 DIABETES MELLITUS WITH COMPLICATION, WITHOUT LONG-TERM CURRENT USE OF INSULIN (H): ICD-10-CM

## 2020-07-06 DIAGNOSIS — E11.9 TYPE 2 DIABETES MELLITUS WITHOUT COMPLICATION, WITHOUT LONG-TERM CURRENT USE OF INSULIN (H): ICD-10-CM

## 2020-07-06 NOTE — TELEPHONE ENCOUNTER
Pending Prescriptions:                       Disp   Refills    gabapentin (NEURONTIN) 300 MG capsule [Ph*540 ca*0            Sig: TAKE TWO CAPSULES (600 MG) BY MOUTH THREE TIMES           DAILY    Last Written Prescription Date:  4/3/20  Last Fill Quantity: 540,  # refills: 0   Last office visit: 11/13/2019 with prescribing provider:     Future Office Visit:   Next 5 appointments (look out 90 days)    Jul 08, 2020  3:20 PM CDT  Office Visit with Lawanda Barrera MD  [facility] ([facility]) [address] [place] [id number]  [phone]             metFORMIN (GLUCOPHAGE) 500 MG tablet [Pha*180 ta*0            Sig: TAKE 2 TABLETS (1000mg) BY MOUTH once daily with           breakfast    Routing refill request to provider for review/approval because:  Labs not current:  A1C    Laurie Macdonald, MSN, RN

## 2020-07-07 DIAGNOSIS — E11.9 TYPE 2 DIABETES MELLITUS WITHOUT COMPLICATION, WITHOUT LONG-TERM CURRENT USE OF INSULIN (H): ICD-10-CM

## 2020-07-07 NOTE — TELEPHONE ENCOUNTER
Needs to schedule DM follow-up with PCP prior to refills and then can fill until appt.     Tom Bliss PA-C

## 2020-07-08 ENCOUNTER — OFFICE VISIT (OUTPATIENT)
Dept: FAMILY MEDICINE | Facility: OTHER | Age: 45
End: 2020-07-08
Payer: COMMERCIAL

## 2020-07-08 VITALS — HEART RATE: 93 BPM | SYSTOLIC BLOOD PRESSURE: 167 MMHG | DIASTOLIC BLOOD PRESSURE: 88 MMHG

## 2020-07-08 DIAGNOSIS — E11.9 TYPE 2 DIABETES, HBA1C GOAL < 7% (H): ICD-10-CM

## 2020-07-08 DIAGNOSIS — R80.9 MICROALBUMINURIA: ICD-10-CM

## 2020-07-08 DIAGNOSIS — I10 BENIGN ESSENTIAL HYPERTENSION: Primary | ICD-10-CM

## 2020-07-08 DIAGNOSIS — E66.01 MORBID OBESITY (H): ICD-10-CM

## 2020-07-08 DIAGNOSIS — E11.9 TYPE 2 DIABETES MELLITUS WITHOUT COMPLICATION, WITHOUT LONG-TERM CURRENT USE OF INSULIN (H): ICD-10-CM

## 2020-07-08 DIAGNOSIS — E11.8 TYPE 2 DIABETES MELLITUS WITH COMPLICATION, WITHOUT LONG-TERM CURRENT USE OF INSULIN (H): ICD-10-CM

## 2020-07-08 DIAGNOSIS — E11.69 TYPE 2 DIABETES MELLITUS WITH OTHER SPECIFIED COMPLICATION, WITHOUT LONG-TERM CURRENT USE OF INSULIN (H): ICD-10-CM

## 2020-07-08 PROCEDURE — 36415 COLL VENOUS BLD VENIPUNCTURE: CPT | Performed by: FAMILY MEDICINE

## 2020-07-08 PROCEDURE — 82043 UR ALBUMIN QUANTITATIVE: CPT | Performed by: FAMILY MEDICINE

## 2020-07-08 PROCEDURE — 80053 COMPREHEN METABOLIC PANEL: CPT | Performed by: FAMILY MEDICINE

## 2020-07-08 PROCEDURE — 83036 HEMOGLOBIN GLYCOSYLATED A1C: CPT | Performed by: FAMILY MEDICINE

## 2020-07-08 PROCEDURE — 99214 OFFICE O/P EST MOD 30 MIN: CPT | Mod: 95 | Performed by: FAMILY MEDICINE

## 2020-07-08 PROCEDURE — 80061 LIPID PANEL: CPT | Performed by: FAMILY MEDICINE

## 2020-07-08 RX ORDER — LISINOPRIL 2.5 MG/1
TABLET ORAL
Qty: 90 TABLET | Refills: 3 | OUTPATIENT
Start: 2020-07-08

## 2020-07-08 RX ORDER — LISINOPRIL 20 MG/1
20 TABLET ORAL DAILY
Qty: 90 TABLET | Refills: 0 | Status: SHIPPED | OUTPATIENT
Start: 2020-07-08 | End: 2020-07-10

## 2020-07-08 RX ORDER — GLIPIZIDE 5 MG/1
5 TABLET, FILM COATED, EXTENDED RELEASE ORAL DAILY
Qty: 90 TABLET | Refills: 0 | Status: SHIPPED | OUTPATIENT
Start: 2020-07-08 | End: 2020-10-20

## 2020-07-08 RX ORDER — GABAPENTIN 300 MG/1
CAPSULE ORAL
Qty: 540 CAPSULE | Refills: 0 | OUTPATIENT
Start: 2020-07-08

## 2020-07-08 RX ORDER — GABAPENTIN 300 MG/1
600 CAPSULE ORAL 3 TIMES DAILY
Qty: 540 CAPSULE | Refills: 0 | Status: SHIPPED | OUTPATIENT
Start: 2020-07-08 | End: 2020-10-13

## 2020-07-08 RX ORDER — METFORMIN HCL 500 MG
1000 TABLET, EXTENDED RELEASE 24 HR ORAL
Qty: 180 TABLET | Refills: 0 | Status: SHIPPED | OUTPATIENT
Start: 2020-07-08 | End: 2020-10-10

## 2020-07-08 NOTE — PROGRESS NOTES
"Estevan Callejas is a 44 year old male who is being evaluated via a billable telephone visit.      The patient has been notified of following:     \"This telephone visit will be conducted via a call between you and your physician/provider. We have found that certain health care needs can be provided without the need for a physical exam.  This service lets us provide the care you need with a short phone conversation.  If a prescription is necessary we can send it directly to your pharmacy.  If lab work is needed we can place an order for that and you can then stop by our lab to have the test done at a later time.    Telephone visits are billed at different rates depending on your insurance coverage. During this emergency period, for some insurers they may be billed the same as an in-person visit.  Please reach out to your insurance provider with any questions.    If during the course of the call the physician/provider feels a telephone visit is not appropriate, you will not be charged for this service.\"    Patient has given verbal consent for Telephone visit?  Yes    What phone number would you like to be contacted at? [phone]    How would you like to obtain your AVS? Mail a copy    Subjective     Estevan Callejas is a 44 year old male who presents via phone visit today for the following health issues:    HPI  Diabetes Follow-up  How often are you checking your blood sugar? One time daily  What time of day are you checking your blood sugars (select all that apply)?  Before meals  Have you had any blood sugars above 200?  No  Have you had any blood sugars below 70?  No    What symptoms do you notice when your blood sugar is low?  Not applicable    What concerns do you have today about your diabetes? None     Do you have any of these symptoms? (Select all that apply)  No numbness or tingling in feet.  No redness, sores or blisters on feet.  No complaints of excessive thirst.  No reports of blurry vision.  No " significant changes to weight.    Have you had a diabetic eye exam in the last 12 months? No    Hyperlipidemia Follow-Up    Are you regularly taking any medication or supplement to lower your cholesterol?   Yes- Atorvastatin    Are you having muscle aches or other side effects that you think could be caused by your cholesterol lowering medication?  No    Hypertension Follow-up    Do you check your blood pressure regularly outside of the clinic? No     Are you following a low salt diet? No    Are your blood pressures ever more than 140 on the top number (systolic) OR more   than 90 on the bottom number (diastolic), for example 140/90? No    BP Readings from Last 2 Encounters:   07/08/20 (!) 167/88   11/14/19 (!) 148/91     Hemoglobin A1C (%)   Date Value   07/08/2020 6.0 (H)   07/19/2019 5.9 (H)     LDL Cholesterol Calculated (mg/dL)   Date Value   07/08/2020 35   07/19/2019 42       How many servings of fruits and vegetables do you eat daily?  2-3    On average, how many sweetened beverages do you drink each day (Examples: soda, juice, sweet tea, etc.  Do NOT count diet or artificially sweetened beverages)?   0    How many days per week do you exercise enough to make your heart beat faster? 3 or less    How many minutes a day do you exercise enough to make your heart beat faster? 9 or less    How many days per week do you miss taking your medication? 0    -------------------------------------    Patient Active Problem List   Diagnosis     Peripheral neuropathy - near S2 dermatome     Type 2 diabetes, HbA1c goal < 7% (H)     Low back pain     Morbid obesity (H)     Perineal abscess     Epidermal cyst of face     Tobacco use disorder     Microalbuminuria     Past Surgical History:   Procedure Laterality Date     EXAM UNDER ANESTHESIA, CHANGE DRESSING (LOCATION), COMBINED N/A 1/15/2018    Procedure: COMBINED EXAM UNDER ANESTHESIA, CHANGE DRESSING (LOCATION);  Dressing Change and exam under Monitored Anesthesia Care  with wound vac placement;  Surgeon: Stefania Munroe MD;  Location: [facility] OR     EXAM UNDER ANESTHESIA, CHANGE DRESSING (LOCATION), COMBINED N/A 1/18/2018    Procedure: COMBINED EXAM UNDER ANESTHESIA, CHANGE DRESSING (LOCATION);  Serial wound vac change perineal;  Surgeon: Stefania Munroe MD;  Location: [facility] OR     IRRIGATION AND DEBRIDEMENT PERINEAL, COMBINED N/A 1/13/2018    Procedure: COMBINED IRRIGATION AND DEBRIDEMENT PERINEAL;  Irrigation and Debridement of perianal abscess;  Surgeon: Stefania Munroe MD;  Location: [facility] OR     IRRIGATION AND DEBRIDEMENT PERINEAL, COMBINED N/A 1/14/2018    Procedure: COMBINED IRRIGATION AND DEBRIDEMENT PERINEAL;  combined irrigation and debridement perineal and dressing change;  Surgeon: Stefania Munroe MD;  Location: [facility] OR     ORTHOPEDIC SURGERY       TONSILLECTOMY, ADENOIDECTOMY, COMBINED         Social History     Tobacco Use     Smoking status: Current Some Day Smoker     Types: Dip, chew, snus or snuff, Cigarettes     Smokeless tobacco: Current User   Substance Use Topics     Alcohol use: Yes     History reviewed. No pertinent family history.      Current Outpatient Medications   Medication Sig Dispense Refill     acetaminophen (TYLENOL) 325 MG tablet Take 2 tablets (650 mg) by mouth every 4 hours as needed for other (multimodal surgical pain management along with NSAIDS and opioid medication as indicated based on pain control and physical function.) 100 tablet      Applicators (COTTON SWABS) SWAB 1 each as needed 100 each 3     aspirin 81 MG tablet Take 1 tablet (81 mg) by mouth daily 90 tablet 3     atorvastatin (LIPITOR) 20 MG tablet Take 1 tablet by mouth daily 90 tablet 0     Bisacodyl (LAXATIVE PO)        blood glucose (NO BRAND SPECIFIED) lancets standard Use to test blood sugar one times daily or as directed. 100 each 3     blood glucose monitoring (NO BRAND SPECIFIED) meter device kit Use to test blood sugar one   times daily or as directed. Preferred blood glucose meter OR supplies to accompany: Blood Glucose Monitor Brands: per insurance.One touch ultra 2 meter 1 kit 0     blood glucose monitoring (NO BRAND SPECIFIED) meter device kit Use to test blood sugar one times daily or as directed. 1 kit 0     cetirizine (ZYRTEC) 10 MG tablet Take 1 tablet (10 mg) by mouth 2 times daily 10 tablet 0     gabapentin (NEURONTIN) 300 MG capsule Take 2 capsules (600 mg) by mouth 3 times daily 540 capsule 0     glipiZIDE (GLUCOTROL XL) 5 MG 24 hr tablet Take 1 tablet (5 mg) by mouth daily 90 tablet 0     Lancets (ONETOUCH DELICA PLUS [id number]) MISC Use to test blood glucose 1 time daily or as directed. 100 each 1     metFORMIN (GLUCOPHAGE-XR) 500 MG 24 hr tablet Take 2 tablets (1,000 mg) by mouth daily (with dinner) 180 tablet 0     ONETOUCH ULTRA test strip Use to test blood glucose 1 time daily or as directed. 100 strip 3     triamcinolone (KENALOG) 0.1 % external ointment Apply topically 2 times daily Use a very small amount mixed in Cerave cream and apply to red itchy area twice daily as needed for itch 30 g 0     lisinopril (ZESTRIL) 20 MG tablet Take 1 tablet (20 mg) by mouth daily 30 tablet 0     Allergies   Allergen Reactions     Seasonal Allergies      Sneezing, itchy eyes     Recent Labs   Lab Test 07/08/20  1530 11/14/19  0924 07/19/19  1216 03/22/19  1436  01/30/18  1456  01/13/18  0126  03/09/17  1753   A1C 6.0*  --  5.9* 6.4*   < > 6.7*  --   --    < > 6.3*   LDL 35  --  42 55  --  Cannot estimate LDL when triglyceride exceeds 400 mg/dL  119*   < >  --   --   --      --  67  --   --  67  --   --   --   --    TRIG 98  --  96  --   --  505*  --   --   --   --    ALT 47  --   --  40  --   --   --  107*  --   --    CR 0.68 0.58*  --  0.76   < > 0.67   < > 0.71  --   --    GFRESTIMATED >90 >90  --  >90   < > >90   < > >90  --   --    GFRESTBLACK >90 >90  --  >90   < > >90   < > >90  --   --    POTASSIUM 4.0 4.1  --  4.4    < > 4.1   < > 3.7  --   --    TSH  --   --   --  0.92  --   --   --   --   --  3.28    < > = values in this interval not displayed.      BP Readings from Last 3 Encounters:   07/08/20 (!) 167/88   11/14/19 (!) 148/91   11/13/19 126/78    Wt Readings from Last 3 Encounters:   11/14/19 120.2 kg (265 lb)   11/13/19 116.3 kg (256 lb 6.4 oz)   07/19/19 123.8 kg (273 lb)                    Reviewed and updated as needed this visit by Provider         Review of Systems   Constitutional, HEENT, cardiovascular, pulmonary, GI, , musculoskeletal, neuro, skin, endocrine and psych systems are negative, except as otherwise noted.       Objective   Reported vitals:  BP (!) 167/88   Pulse 93    healthy, alert and no distress  PSYCH: Alert and oriented times 3; coherent speech, normal   rate and volume, able to articulate logical thoughts, able   to abstract reason, no tangential thoughts, no hallucinations   or delusions  His affect is normal  RESP: No cough, no audible wheezing, able to talk in full sentences  Remainder of exam unable to be completed due to telephone visits    Diagnostic Test Results:  Labs reviewed in Epic        Assessment/Plan:  Problem List Items Addressed This Visit     Type 2 diabetes, HbA1c goal < 7% (H)     Recheck a1c shows well controlled blood sugars  Continue metformin and glipizide.  Refill meds  Denies any changes with his feet or vision  Recheck in 3-6 months           Relevant Medications    gabapentin (NEURONTIN) 300 MG capsule    glipiZIDE (GLUCOTROL XL) 5 MG 24 hr tablet    metFORMIN (GLUCOPHAGE-XR) 500 MG 24 hr tablet    Morbid obesity (H)    Relevant Medications    glipiZIDE (GLUCOTROL XL) 5 MG 24 hr tablet    metFORMIN (GLUCOPHAGE-XR) 500 MG 24 hr tablet    Microalbuminuria     Increase dose of lisinopril to 20mg to help with high blood pressure and help diabetic nephropathy           Other Visit Diagnoses     Benign essential hypertension    -  Primary    Type 2 diabetes mellitus without  complication, without long-term current use of insulin (H)        Relevant Medications    gabapentin (NEURONTIN) 300 MG capsule    glipiZIDE (GLUCOTROL XL) 5 MG 24 hr tablet    metFORMIN (GLUCOPHAGE-XR) 500 MG 24 hr tablet    Other Relevant Orders    Albumin Random Urine Quantitative with Creat Ratio (Completed)    Comprehensive metabolic panel (BMP + Alb, Alk Phos, ALT, AST, Total. Bili, TP) (Completed)    HEMOGLOBIN A1C (Completed)    Lipid panel reflex to direct LDL Fasting (Completed)    Type 2 diabetes mellitus with other specified complication, without long-term current use of insulin (H)        Relevant Medications    glipiZIDE (GLUCOTROL XL) 5 MG 24 hr tablet    metFORMIN (GLUCOPHAGE-XR) 500 MG 24 hr tablet    Type 2 diabetes mellitus with complication, without long-term current use of insulin (H)        Relevant Medications    glipiZIDE (GLUCOTROL XL) 5 MG 24 hr tablet    metFORMIN (GLUCOPHAGE-XR) 500 MG 24 hr tablet             No follow-ups on file.      Phone call duration:  8 minutes    Lawanda Barrera MD

## 2020-07-08 NOTE — TELEPHONE ENCOUNTER
Needs appointment for further refills.  Next 5 appointments (look out 90 days)    Jul 08, 2020  3:20 PM CDT  Office Visit with Lawanda Barrera MD  [facility] ([facility]) [address] [place] [phone]  [phone]        Charlie Vidales RN, BSN

## 2020-07-09 DIAGNOSIS — E11.8 TYPE 2 DIABETES MELLITUS WITH COMPLICATION, WITHOUT LONG-TERM CURRENT USE OF INSULIN (H): ICD-10-CM

## 2020-07-09 DIAGNOSIS — E11.9 TYPE 2 DIABETES MELLITUS WITHOUT COMPLICATION, WITHOUT LONG-TERM CURRENT USE OF INSULIN (H): ICD-10-CM

## 2020-07-09 LAB
ALBUMIN SERPL-MCNC: 4.2 G/DL (ref 3.4–5)
ALP SERPL-CCNC: 109 U/L (ref 40–150)
ALT SERPL W P-5'-P-CCNC: 47 U/L (ref 0–70)
ANION GAP SERPL CALCULATED.3IONS-SCNC: 9 MMOL/L (ref 3–14)
AST SERPL W P-5'-P-CCNC: 31 U/L (ref 0–45)
BILIRUB SERPL-MCNC: 0.5 MG/DL (ref 0.2–1.3)
BUN SERPL-MCNC: 8 MG/DL (ref 7–30)
CALCIUM SERPL-MCNC: 8.7 MG/DL (ref 8.5–10.1)
CHLORIDE SERPL-SCNC: 107 MMOL/L (ref 94–109)
CHOLEST SERPL-MCNC: 158 MG/DL
CO2 SERPL-SCNC: 24 MMOL/L (ref 20–32)
CREAT SERPL-MCNC: 0.68 MG/DL (ref 0.66–1.25)
CREAT UR-MCNC: 458 MG/DL
GFR SERPL CREATININE-BSD FRML MDRD: >90 ML/MIN/{1.73_M2}
GLUCOSE SERPL-MCNC: 125 MG/DL (ref 70–99)
HBA1C MFR BLD: 6 % (ref 0–5.6)
HDLC SERPL-MCNC: 103 MG/DL
LDLC SERPL CALC-MCNC: 35 MG/DL
MICROALBUMIN UR-MCNC: 122 MG/L
MICROALBUMIN/CREAT UR: 26.64 MG/G CR (ref 0–17)
NONHDLC SERPL-MCNC: 55 MG/DL
POTASSIUM SERPL-SCNC: 4 MMOL/L (ref 3.4–5.3)
PROT SERPL-MCNC: 8.1 G/DL (ref 6.8–8.8)
SODIUM SERPL-SCNC: 140 MMOL/L (ref 133–144)
TRIGL SERPL-MCNC: 98 MG/DL

## 2020-07-09 NOTE — TELEPHONE ENCOUNTER
Pending Prescriptions:                       Disp   Refills    lisinopril (ZESTRIL) 2.5 MG tablet [Pharma*90 tab*3        Sig: TAKE ONE TABLET BY MOUTH once daily    metFORMIN (GLUCOPHAGE) 500 MG tablet [Phar*180 ta*0        Sig: TAKE 2 TABLETS (1000mg) BY MOUTH once daily with           breakfast    BP Readings from Last 3 Encounters:   07/08/20 (!) 167/88   11/14/19 (!) 148/91   11/13/19 126/78       Routing refill request to provider for review/approval because:  Labs out of range:  B/P    Laurie Macdonald, MSN, RN

## 2020-07-10 ENCOUNTER — TELEPHONE (OUTPATIENT)
Dept: FAMILY MEDICINE | Facility: OTHER | Age: 45
End: 2020-07-10

## 2020-07-10 DIAGNOSIS — E11.9 TYPE 2 DIABETES MELLITUS WITHOUT COMPLICATION, WITHOUT LONG-TERM CURRENT USE OF INSULIN (H): Primary | ICD-10-CM

## 2020-07-10 RX ORDER — LISINOPRIL 2.5 MG/1
TABLET ORAL
Qty: 90 TABLET | Refills: 3 | OUTPATIENT
Start: 2020-07-10

## 2020-07-10 RX ORDER — LISINOPRIL 40 MG/1
40 TABLET ORAL DAILY
Qty: 90 TABLET | Refills: 0 | Status: SHIPPED | OUTPATIENT
Start: 2020-07-10 | End: 2020-07-10 | Stop reason: DRUGHIGH

## 2020-07-10 RX ORDER — LISINOPRIL 20 MG/1
20 TABLET ORAL DAILY
Qty: 30 TABLET | Refills: 0 | Status: SHIPPED | OUTPATIENT
Start: 2020-07-10 | End: 2020-08-18

## 2020-07-10 NOTE — TELEPHONE ENCOUNTER
Call received from pharmacy.   He has never filled any dosing other than the 2.5 mg.   Huddled with RK, patient should continue with 20 mg Lisinopril dosing, which was sent to the pharmacy.   Called patient to make sure he understood that he should take 20 mg Lisinopril, not 40 mg Lisinopril.   I was unable to LM.     Magdalena Aguirre, RN, BSN

## 2020-07-10 NOTE — TELEPHONE ENCOUNTER
[facility] drug pharmacy has a question regarding the lisinopril 40 mg that you sent over today. They wanted to confirm with you that you wanted this dosage, due to patient had only picked up the 2.5 mg before. Please clarify.

## 2020-07-12 PROBLEM — R80.9 MICROALBUMINURIA: Status: ACTIVE | Noted: 2020-07-12

## 2020-07-13 NOTE — ASSESSMENT & PLAN NOTE
Recheck a1c shows well controlled blood sugars  Continue metformin and glipizide.  Refill meds  Denies any changes with his feet or vision  Recheck in 3-6 months

## 2020-07-13 NOTE — ASSESSMENT & PLAN NOTE
Increase dose of lisinopril to 20mg to help with high blood pressure and help diabetic nephropathy

## 2020-07-14 DIAGNOSIS — E11.8 TYPE 2 DIABETES MELLITUS WITH COMPLICATION, WITHOUT LONG-TERM CURRENT USE OF INSULIN (H): ICD-10-CM

## 2020-07-14 RX ORDER — ATORVASTATIN CALCIUM 20 MG/1
TABLET, FILM COATED ORAL
Qty: 90 TABLET | Refills: 3 | Status: SHIPPED | OUTPATIENT
Start: 2020-07-14 | End: 2021-07-07

## 2020-07-14 NOTE — TELEPHONE ENCOUNTER
Prescription approved per [facility] Refill Protocol.    Magdalena Aguirre, RN, BSN

## 2020-08-14 DIAGNOSIS — E11.9 TYPE 2 DIABETES MELLITUS WITHOUT COMPLICATION, WITHOUT LONG-TERM CURRENT USE OF INSULIN (H): ICD-10-CM

## 2020-08-14 NOTE — TELEPHONE ENCOUNTER
"Requested Prescriptions   Pending Prescriptions Disp Refills     lisinopril (ZESTRIL) 20 MG tablet [Pharmacy Med Name: lisinopril 20 mg tablet] 30 tablet 0     Sig: Take 1 tablet by mouth daily       ACE Inhibitors (Including Combos) Protocol Failed - 8/14/2020  4:00 PM        Failed - Blood pressure under 140/90 in past 12 months     BP Readings from Last 3 Encounters:   07/08/20 (!) 167/88   11/14/19 (!) 148/91   11/13/19 126/78                 Passed - Recent (12 mo) or future (30 days) visit within the authorizing provider's specialty     Patient has had an office visit with the authorizing provider or a provider within the authorizing providers department within the previous 12 mos or has a future within next 30 days. See \"Patient Info\" tab in inbasket, or \"Choose Columns\" in Meds & Orders section of the refill encounter.              Passed - Medication is active on med list        Passed - Patient is age 18 or older        Passed - Normal serum creatinine on file in past 12 months     Recent Labs   Lab Test 07/08/20  1530  11/05/18  1220   CR 0.68   < >  --    CREAT  --   --  0.6*    < > = values in this interval not displayed.       Ok to refill medication if creatinine is low          Passed - Normal serum potassium on file in past 12 months     Recent Labs   Lab Test 07/08/20  1530   POTASSIUM 4.0                Routing refill request to provider for review/approval because:  BP out of range.    Levi Dang, RN, BSN          "

## 2020-08-18 RX ORDER — LISINOPRIL 20 MG/1
TABLET ORAL
Qty: 30 TABLET | Refills: 0 | Status: SHIPPED | OUTPATIENT
Start: 2020-08-18 | End: 2020-09-22

## 2020-09-22 ENCOUNTER — VIRTUAL VISIT (OUTPATIENT)
Dept: FAMILY MEDICINE | Facility: OTHER | Age: 45
End: 2020-09-22
Payer: COMMERCIAL

## 2020-09-22 DIAGNOSIS — I10 HTN, GOAL BELOW 140/90: Primary | ICD-10-CM

## 2020-09-22 DIAGNOSIS — E11.9 TYPE 2 DIABETES MELLITUS WITHOUT COMPLICATION, WITHOUT LONG-TERM CURRENT USE OF INSULIN (H): ICD-10-CM

## 2020-09-22 DIAGNOSIS — R07.0 THROAT PAIN: ICD-10-CM

## 2020-09-22 PROCEDURE — 99214 OFFICE O/P EST MOD 30 MIN: CPT | Mod: 95 | Performed by: PHYSICIAN ASSISTANT

## 2020-09-22 RX ORDER — LISINOPRIL 20 MG/1
20 TABLET ORAL DAILY
Qty: 30 TABLET | Refills: 0 | Status: SHIPPED | OUTPATIENT
Start: 2020-09-22 | End: 2020-10-17

## 2020-09-22 RX ORDER — AMOXICILLIN 500 MG/1
500 CAPSULE ORAL 3 TIMES DAILY
Qty: 21 CAPSULE | Refills: 0 | Status: SHIPPED | OUTPATIENT
Start: 2020-09-22 | End: 2020-09-29

## 2020-09-22 NOTE — LETTER
[address]  [facility] [address]  Phone: [phone]    September 22, 2020        Estevan Callejas  [address] [facility] [address]          To whom it may concern:    RE: Estevan Callejas    Patient was seen and treated today at our clinic and missed work.  Patient may return to work September 23, 2020 with the following:  No working or lifting restrictions    Please contact me for questions or concerns.      Sincerely,        Thom Franco PA-C

## 2020-09-22 NOTE — PROGRESS NOTES
"Estevan Callejas is a 45 year old male who is being evaluated via a billable telephone visit.      The patient has been notified of following:     \"This telephone visit will be conducted via a call between you and your physician/provider. We have found that certain health care needs can be provided without the need for a physical exam.  This service lets us provide the care you need with a short phone conversation.  If a prescription is necessary we can send it directly to your pharmacy.  If lab work is needed we can place an order for that and you can then stop by our lab to have the test done at a later time.    Telephone visits are billed at different rates depending on your insurance coverage. During this emergency period, for some insurers they may be billed the same as an in-person visit.  Please reach out to your insurance provider with any questions.    If during the course of the call the physician/provider feels a telephone visit is not appropriate, you will not be charged for this service.\"    Patient has given verbal consent for Telephone visit?  Yes    What phone number would you like to be contacted at? [phone]    How would you like to obtain your AVS? Mail a copy    Subjective     Estevan Callejas is a 45 year old male who presents via phone visit today for the following health issues:    HPI    Acute Illness  Acute illness concerns:Sore throat  Onset/Duration: 09/18/2020  Symptoms:  Fever: no  Chills/Sweats: no  Headache (location?): no  Sinus Pressure: YES  Conjunctivitis:  no  Ear Pain: YES: right  Rhinorrhea: YES  Congestion: YES  Sore Throat: YES  Cough: YES  Wheeze: no  Decreased Appetite: no  Nausea: no  Vomiting: no  Diarrhea: no  Dysuria/Freq.: no  Dysuria or Hematuria: no  Fatigue/Achiness: no  Sick/Strep Exposure: no  Therapies tried and outcome: Started taking sisters antibiotic  Patient began taking his sister's antibiotics on Saturday.  This is amoxicillin 500 mg.  Advised that from " overall signs and symptoms this is more likely viral process and the antibiotics will not work but once he gets them started he should finish them to a total of a 10-day course.    Patient has not been checking his blood pressure as previously directed by his primary care provider.  Recommended that he follow-up with his primary care provider within the next 30 days.  Gave him a refill of 30 days of his lisinopril.    Review of Systems   Constitutional, HEENT, cardiovascular, pulmonary, GI, , musculoskeletal, neuro, skin, endocrine and psych systems are negative, except as otherwise noted.       Objective          Vitals:  No vitals were obtained today due to virtual visit.    healthy, alert and no distress  PSYCH: Alert and oriented times 3; coherent speech, normal   rate and volume, able to articulate logical thoughts, able   to abstract reason, no tangential thoughts, no hallucinations   or delusions  His affect is normal  RESP: No cough, no audible wheezing, able to talk in full sentences  Remainder of exam unable to be completed due to telephone visits          Assessment/Plan:    Assessment & Plan     1. Type 2 diabetes mellitus without complication, without long-term current use of insulin (H)  2. HTN, goal below 140/90  Keshia refill given for 3 days.  No further refills without an office visit and blood pressure check.  His last blood pressure was not good.  - lisinopril (ZESTRIL) 20 MG tablet; Take 1 tablet (20 mg) by mouth daily  Dispense: 30 tablet; Refill: 0      3. Throat pain  More than likely this is viral in nature however he has already started antibiotics and I recommend that he complete a 10-day course.  Saving antibiotics for later is never a good idea.  - amoxicillin (AMOXIL) 500 MG capsule; Take 1 capsule (500 mg) by mouth 3 times daily for 7 days  Dispense: 21 capsule; Refill: 0       Tobacco Cessation:   reports that he has been smoking dip, chew, snus or snuff and cigarettes. He uses  smokeless tobacco.  Tobacco Cessation Action Plan: Information offered: Patient not interested at this time    Return in about 3 weeks (around 10/13/2020) for BP Recheck with provider.    Thom Franco PA-C  [facility]    Phone call duration:  8 minutes

## 2020-10-10 DIAGNOSIS — E11.9 TYPE 2 DIABETES MELLITUS WITHOUT COMPLICATION, WITHOUT LONG-TERM CURRENT USE OF INSULIN (H): ICD-10-CM

## 2020-10-10 RX ORDER — METFORMIN HCL 500 MG
TABLET, EXTENDED RELEASE 24 HR ORAL
Qty: 180 TABLET | Refills: 0 | Status: SHIPPED | OUTPATIENT
Start: 2020-10-10 | End: 2021-01-04

## 2020-10-10 NOTE — TELEPHONE ENCOUNTER
Pending Prescriptions:                       Disp   Refills    metFORMIN (GLUCOPHAGE-XR) 500 MG 24 hr ta*180 ta*0            Sig: Take 2 tablets (1,000 mg) by mouth daily with           dinner    gabapentin (NEURONTIN) 300 MG capsule [Ph*540 ca*0            Sig: Take 2 capsules (600 mg) by mouth 3 times daily    Medication is being filled for 1 time saurav refill only due to:  Patient is due for med check    Please call and help schedule.  Thank you!

## 2020-10-10 NOTE — TELEPHONE ENCOUNTER
Pending Prescriptions:                       Disp   Refills    gabapentin (NEURONTIN) 300 MG capsule [Pha*540 ca*0        Sig: Take 2 capsules (600 mg) by mouth 3 times daily    Signed Prescriptions:                        Disp   Refills    metFORMIN (GLUCOPHAGE-XR) 500 MG 24 hr tab*180 ta*0        Sig: Take 2 tablets (1,000 mg) by mouth daily with dinner  Authorizing Provider: DON DOUGLAS  Ordering User: EDY CHAPMAN    Routing refill request to provider for review/approval because:  Drug not on the [facility] refill protocol

## 2020-10-13 RX ORDER — GABAPENTIN 300 MG/1
600 CAPSULE ORAL 3 TIMES DAILY
Qty: 60 CAPSULE | Refills: 0 | Status: SHIPPED | OUTPATIENT
Start: 2020-10-13 | End: 2020-11-05

## 2020-10-13 NOTE — TELEPHONE ENCOUNTER
Was advised office visit for chronic disease management  before further refills. Schedule appt. Short script sent in the interim

## 2020-10-13 NOTE — TELEPHONE ENCOUNTER
Patient scheduled.     Next 5 appointments (look out 90 days)    Oct 16, 2020  2:00 PM  Office Visit with Lawanda Barrera MD  [facility] ([facility]) [address] [place] [id number]  [phone]          Guru Snow MA on 10/13/2020 at 6:19 PM

## 2020-10-15 DIAGNOSIS — E11.9 TYPE 2 DIABETES MELLITUS WITHOUT COMPLICATION, WITHOUT LONG-TERM CURRENT USE OF INSULIN (H): ICD-10-CM

## 2020-10-15 RX ORDER — LISINOPRIL 20 MG/1
TABLET ORAL
Qty: 30 TABLET | Refills: 0 | OUTPATIENT
Start: 2020-10-15

## 2020-10-15 NOTE — PROGRESS NOTES
Subjective     Estevan Callejas is a 45 year old male who presents to clinic today for the following health issues:    History of Present Illness       Diabetes:   He presents for follow up of diabetes.  He is checking home blood glucose a few times a month. He checks blood glucose before meals.  Blood glucose is never over 200 and never under 70. When his blood glucose is low, the patient is asymptomatic for confusion, blurred vision, lethargy and reports not feeling dizzy, shaky, or weak.  He has no concerns regarding his diabetes at this time.  He is not experiencing numbness or burning in feet, excessive thirst, blurry vision, weight changes or redness, sores or blisters on feet. The patient has not had a diabetic eye exam in the last 12 months.         Hyperlipidemia:  He presents for follow up of hyperlipidemia.  He is taking medication to lower cholesterol. He is having myalgia or other side effects to statin medications.    Hypertension: He presents for follow up of hypertension.  He does not check blood pressure  regularly outside of the clinic. Outpatient blood pressures have not been over 140/90. He follows a low salt diet.     Vascular Disease:  He presents for follow up of vascular disease.  He never takes nitroglycerin. He takes daily aspirin.    He eats 0-1 servings of fruits and vegetables daily.He consumes 2 sweetened beverage(s) daily.He exercises with enough effort to increase his heart rate 20 to 29 minutes per day.  He exercises with enough effort to increase his heart rate 5 days per week. He is missing 2 dose(s) of medications per week.  He is not taking prescribed medications regularly due to other.     Review of Systems   Constitutional, HEENT, cardiovascular, pulmonary, GI, , musculoskeletal, neuro, skin, endocrine and psych systems are negative, except as otherwise noted.      Objective    /82   Pulse 87   Temp 98.9  F (37.2  C) (Temporal)   Resp 14   Wt 112.9 kg (249 lb)   SpO2  97%   BMI 35.22 kg/m    Body mass index is 35.22 kg/m .  Physical Exam  Constitutional:       Appearance: Normal appearance. He is well-developed.   HENT:      Head: Normocephalic and atraumatic.      Right Ear: Tympanic membrane, ear canal and external ear normal. There is no impacted cerumen.      Left Ear: Tympanic membrane, ear canal and external ear normal. There is no impacted cerumen.   Neck:      Musculoskeletal: Neck supple.   Cardiovascular:      Rate and Rhythm: Normal rate and regular rhythm.      Pulses: Normal pulses.      Heart sounds: Normal heart sounds. No murmur. No friction rub. No gallop.    Pulmonary:      Effort: Pulmonary effort is normal. No respiratory distress.      Breath sounds: Normal breath sounds. No stridor. No wheezing or rales.   Chest:      Chest wall: No tenderness.   Musculoskeletal:      Comments: Foot exam normal   Neurological:      Mental Status: He is alert and oriented to person, place, and time.   Psychiatric:         Behavior: Behavior normal.         Thought Content: Thought content normal.         Judgment: Judgment normal.       Assessment & Plan   1. Type 2 diabetes mellitus without complication, without long-term current use of insulin (H)  -latest a1c at 5.3  -Home fasting Blood sugars- <130 fasting. Not cheking regularly  -Last eye exam-over due . Advised to update  -Diabetic foot exam -neg fro neuropathy  -Last urine microalbumin-positive for microalbuminuria. Continue lisinopril.  -Last LDL-continue lipitor-20mg  -Continue current medications- metformin , glipizide  -Refills provided  -Discussed diet , lifestyle modifications, exercise and weight loss  -RTC in 3-6 months for follow up      - Hemoglobin A1c  - FOOT EXAM    2. Benign essential hypertension  Well controlled blood pressures on the current dose of lisinopril  Recheck in 6 months           See Patient Instructions  Return in about 6 months (around 4/16/2021).    Lawanda Barrera MD  [facility]  [facility]

## 2020-10-16 ENCOUNTER — OFFICE VISIT (OUTPATIENT)
Dept: FAMILY MEDICINE | Facility: OTHER | Age: 45
End: 2020-10-16
Payer: COMMERCIAL

## 2020-10-16 VITALS
RESPIRATION RATE: 14 BRPM | BODY MASS INDEX: 35.22 KG/M2 | DIASTOLIC BLOOD PRESSURE: 82 MMHG | WEIGHT: 249 LBS | TEMPERATURE: 98.9 F | HEART RATE: 87 BPM | OXYGEN SATURATION: 97 % | SYSTOLIC BLOOD PRESSURE: 134 MMHG

## 2020-10-16 DIAGNOSIS — E11.9 TYPE 2 DIABETES MELLITUS WITHOUT COMPLICATION, WITHOUT LONG-TERM CURRENT USE OF INSULIN (H): Primary | ICD-10-CM

## 2020-10-16 DIAGNOSIS — I10 BENIGN ESSENTIAL HYPERTENSION: ICD-10-CM

## 2020-10-16 LAB — HBA1C MFR BLD: 5.3 % (ref 0–5.6)

## 2020-10-16 PROCEDURE — 99214 OFFICE O/P EST MOD 30 MIN: CPT | Performed by: FAMILY MEDICINE

## 2020-10-16 PROCEDURE — 99207 PR FOOT EXAM NO CHARGE: CPT | Mod: 25 | Performed by: FAMILY MEDICINE

## 2020-10-16 PROCEDURE — 36415 COLL VENOUS BLD VENIPUNCTURE: CPT | Performed by: FAMILY MEDICINE

## 2020-10-16 PROCEDURE — 83036 HEMOGLOBIN GLYCOSYLATED A1C: CPT | Performed by: FAMILY MEDICINE

## 2020-10-17 DIAGNOSIS — E11.9 TYPE 2 DIABETES MELLITUS WITHOUT COMPLICATION, WITHOUT LONG-TERM CURRENT USE OF INSULIN (H): ICD-10-CM

## 2020-10-17 RX ORDER — LISINOPRIL 20 MG/1
TABLET ORAL
Qty: 90 TABLET | Refills: 3 | Status: SHIPPED | OUTPATIENT
Start: 2020-10-17 | End: 2021-10-21

## 2020-10-17 NOTE — TELEPHONE ENCOUNTER
Prescription approved per [facility] Refill Protocol.  Humera Suarez RN  October 17, 2020.

## 2020-10-19 DIAGNOSIS — E11.69 TYPE 2 DIABETES MELLITUS WITH OTHER SPECIFIED COMPLICATION, WITHOUT LONG-TERM CURRENT USE OF INSULIN (H): ICD-10-CM

## 2020-10-20 RX ORDER — GLIPIZIDE 5 MG/1
TABLET, FILM COATED, EXTENDED RELEASE ORAL
Qty: 90 TABLET | Refills: 1 | Status: SHIPPED | OUTPATIENT
Start: 2020-10-20 | End: 2021-06-04

## 2020-10-20 NOTE — TELEPHONE ENCOUNTER
Pending Prescriptions:                       Disp   Refills    glipiZIDE (GLUCOTROL XL) 5 MG 24 hr table*90 tab*1            Sig: Take 1 tablet by mouth daily      Prescription approved per [facility] Refill Protocol.    Juany Kapoor RN BSN

## 2020-11-02 DIAGNOSIS — E11.9 TYPE 2 DIABETES MELLITUS WITHOUT COMPLICATION, WITHOUT LONG-TERM CURRENT USE OF INSULIN (H): ICD-10-CM

## 2020-11-02 NOTE — TELEPHONE ENCOUNTER
Pending Prescriptions:                       Disp   Refills    gabapentin (NEURONTIN) 300 MG capsule [Pha*60 cap*0        Sig: Take 2 capsules (600 mg) by mouth 3 times daily    Routing refill request to provider for review/approval because:  Drug not on the [facility] refill protocol

## 2020-11-05 DIAGNOSIS — E11.9 TYPE 2 DIABETES MELLITUS WITHOUT COMPLICATION, WITHOUT LONG-TERM CURRENT USE OF INSULIN (H): ICD-10-CM

## 2020-11-05 RX ORDER — GABAPENTIN 300 MG/1
600 CAPSULE ORAL 3 TIMES DAILY
Qty: 60 CAPSULE | Refills: 2 | Status: SHIPPED | OUTPATIENT
Start: 2020-11-05 | End: 2020-11-06

## 2020-11-05 NOTE — TELEPHONE ENCOUNTER
Pending Prescriptions:                       Disp   Refills    gabapentin (NEURONTIN) 300 MG capsule [Pha*60 cap*2        Sig: Take 2 capsules (600 mg) by mouth 3 times daily      Routing refill request to provider for review/approval because:  Drug not on the [facility] refill protocol   See pharmacy note, requesting more than 10 day supply  Amber Richard RN

## 2020-11-05 NOTE — TELEPHONE ENCOUNTER
Pharmacy calling stating they received rx renewal for 10 day supply 60 capsules, wondering if this was an error?

## 2020-11-06 RX ORDER — GABAPENTIN 300 MG/1
600 CAPSULE ORAL 3 TIMES DAILY
Qty: 540 CAPSULE | Refills: 0 | Status: SHIPPED | OUTPATIENT
Start: 2020-11-06 | End: 2021-02-16

## 2020-11-06 NOTE — TELEPHONE ENCOUNTER
Pending Prescriptions:                       Disp   Refills    gabapentin (NEURONTIN) 300 MG capsule [Pha*60 cap*2        Sig: Take 2 capsules (600 mg) by mouth 3 times daily    See below    Amber Richard RN

## 2021-01-01 DIAGNOSIS — E11.9 TYPE 2 DIABETES MELLITUS WITHOUT COMPLICATION, WITHOUT LONG-TERM CURRENT USE OF INSULIN (H): ICD-10-CM

## 2021-01-03 DIAGNOSIS — E11.9 TYPE 2 DIABETES MELLITUS WITHOUT COMPLICATION, WITHOUT LONG-TERM CURRENT USE OF INSULIN (H): ICD-10-CM

## 2021-01-04 RX ORDER — METFORMIN HCL 500 MG
TABLET, EXTENDED RELEASE 24 HR ORAL
Qty: 180 TABLET | Refills: 0 | Status: SHIPPED | OUTPATIENT
Start: 2021-01-04 | End: 2021-04-15

## 2021-01-04 RX ORDER — LANCETS 33 GAUGE
EACH MISCELLANEOUS
Qty: 50 EACH | Refills: 6 | Status: SHIPPED | OUTPATIENT
Start: 2021-01-04

## 2021-01-04 NOTE — TELEPHONE ENCOUNTER
Pres[place] per [facility] [place].  Humera Suarez RN  January 4, 2021

## 2021-01-04 NOTE — TELEPHONE ENCOUNTER
Prescription approved per [facility] Refill Protocol.    Deysi Holm RN on 1/4/2021 at 5:43 PM

## 2021-01-15 NOTE — PROGRESS NOTES
1. Type 2 diabetes mellitus with complication, without long-term current use of insulin (H)  Last a1c at 5.3. continue diet and lifestyle modifications. Continue metfromin and glipizide.    2. Morbid obesity (H)  Encouraged to continue work on weight loss    3. Local infection of skin and subcutaneous tissue   noted to have a purulent discharge along the left side of the perineum from a skin abscess which spontaneuously drained. No induration or erythema noted. Due to underlying diabetes which can slow down his healing , would recommend antibiotics.   Discussed home care  Reportable signs and symptoms discussed  RTC if symptoms persist or fail to improve    - sulfamethoxazole-trimethoprim (BACTRIM DS) 800-160 MG tablet; Take 1 tablet by mouth 2 times daily for 7 days  Dispense: 14 tablet; Refill: 0      Subjective     Prashanth is a 45 year old who presents to clinic today for the following health issues     HPI       Acute Illness  Acute illness concerns: noticed the abscess in the lower back between the buttocks. Noticed initially 2 weeks ago which popped spontaneously , the second one was last week, noticed some foul smelling discharge. No fevers.  Onset/Duration: 2 weeks  Symptoms:  Fever: no  Chills/Sweats: no  Headache (location?): no    Therapies tried and outcome: peroxide    Review of Systems   Constitutional, HEENT, cardiovascular, pulmonary, GI, , musculoskeletal, neuro, skin, endocrine and psych systems are negative, except as otherwise noted.      Objective    There were no vitals taken for this visit.  There is no height or weight on file to calculate BMI.  Physical Exam   GENERAL: healthy, alert and no distress  NECK: no adenopathy, no asymmetry, masses, or scars and thyroid normal to palpation  RESP: lungs clear to auscultation - no rales, rhonchi or wheezes  CV: regular rate and rhythm, normal S1 S2, no S3 or S4, no murmur, click or rub, no peripheral edema and peripheral pulses strong  SKIN: see  assessment- purulent discharge from the left side of the perineum likely from a skin abscess.

## 2021-01-18 ENCOUNTER — OFFICE VISIT (OUTPATIENT)
Dept: FAMILY MEDICINE | Facility: OTHER | Age: 46
End: 2021-01-18
Payer: COMMERCIAL

## 2021-01-18 VITALS
DIASTOLIC BLOOD PRESSURE: 82 MMHG | HEART RATE: 61 BPM | WEIGHT: 243 LBS | SYSTOLIC BLOOD PRESSURE: 138 MMHG | OXYGEN SATURATION: 98 % | RESPIRATION RATE: 16 BRPM | BODY MASS INDEX: 34.79 KG/M2 | HEIGHT: 70 IN | TEMPERATURE: 98.5 F

## 2021-01-18 DIAGNOSIS — E11.8 TYPE 2 DIABETES MELLITUS WITH COMPLICATION, WITHOUT LONG-TERM CURRENT USE OF INSULIN (H): ICD-10-CM

## 2021-01-18 DIAGNOSIS — L08.9 LOCAL INFECTION OF SKIN AND SUBCUTANEOUS TISSUE: Primary | ICD-10-CM

## 2021-01-18 DIAGNOSIS — E66.01 MORBID OBESITY (H): ICD-10-CM

## 2021-01-18 PROCEDURE — 99214 OFFICE O/P EST MOD 30 MIN: CPT | Performed by: FAMILY MEDICINE

## 2021-01-18 RX ORDER — SULFAMETHOXAZOLE/TRIMETHOPRIM 800-160 MG
1 TABLET ORAL 2 TIMES DAILY
Qty: 14 TABLET | Refills: 0 | Status: SHIPPED | OUTPATIENT
Start: 2021-01-18 | End: 2021-01-25

## 2021-01-18 ASSESSMENT — MIFFLIN-ST. JEOR: SCORE: 1993.49

## 2021-01-18 ASSESSMENT — PAIN SCALES - GENERAL: PAINLEVEL: NO PAIN (0)

## 2021-02-16 ENCOUNTER — OFFICE VISIT (OUTPATIENT)
Dept: FAMILY MEDICINE | Facility: OTHER | Age: 46
End: 2021-02-16
Payer: COMMERCIAL

## 2021-02-16 VITALS
RESPIRATION RATE: 20 BRPM | BODY MASS INDEX: 34.96 KG/M2 | SYSTOLIC BLOOD PRESSURE: 138 MMHG | WEIGHT: 236 LBS | OXYGEN SATURATION: 98 % | HEIGHT: 69 IN | DIASTOLIC BLOOD PRESSURE: 84 MMHG | TEMPERATURE: 98.9 F | HEART RATE: 88 BPM

## 2021-02-16 DIAGNOSIS — L02.215 PERINEAL ABSCESS: ICD-10-CM

## 2021-02-16 DIAGNOSIS — E11.8 TYPE 2 DIABETES MELLITUS WITH COMPLICATION, WITHOUT LONG-TERM CURRENT USE OF INSULIN (H): Primary | ICD-10-CM

## 2021-02-16 PROCEDURE — 99214 OFFICE O/P EST MOD 30 MIN: CPT | Performed by: PHYSICIAN ASSISTANT

## 2021-02-16 RX ORDER — GABAPENTIN 300 MG/1
600 CAPSULE ORAL 3 TIMES DAILY PRN
COMMUNITY
Start: 2020-10-15 | End: 2022-03-22

## 2021-02-16 RX ORDER — SULFAMETHOXAZOLE/TRIMETHOPRIM 800-160 MG
1 TABLET ORAL 2 TIMES DAILY
Qty: 20 TABLET | Refills: 0 | Status: SHIPPED | OUTPATIENT
Start: 2021-02-16 | End: 2022-03-22

## 2021-02-16 ASSESSMENT — MIFFLIN-ST. JEOR: SCORE: 1950.87

## 2021-02-16 ASSESSMENT — PAIN SCALES - GENERAL: PAINLEVEL: MILD PAIN (3)

## 2021-02-16 NOTE — LETTER
[address]  [facility] [address]  Phone: [phone]    February 16, 2021        Estevan Callejas  [address] [place] [id number]          To whom it may concern:    RE: Estevan Callejas    Patient was seen and treated today at our clinic and missed work.  Patient may return to work 2/17/21 with the following:  No working or lifting restrictions    Please contact me for questions or concerns.      Sincerely,        Thom Franco PA-C

## 2021-02-16 NOTE — PROGRESS NOTES
"    Assessment & Plan     Type 2 diabetes mellitus with complication, without long-term current use of insulin (H)  Perineal abscess - probable sebaceous cyst by description  Treat with antibiotics today and ask our surgeons to consider marsupialization of this cyst for permanent removal.  Today's abscess does not appear to be large enough nor does it come and present to a head that is itself well to I&D in the clinic today.  - sulfamethoxazole-trimethoprim (BACTRIM DS) 800-160 MG tablet; Take 1 tablet by mouth 2 times daily  - GENERAL SURG ADULT REFERRAL; Future           BMI:   Estimated body mass index is 34.85 kg/m  as calculated from the following:    Height as of this encounter: 1.753 m (5' 9\").    Weight as of this encounter: 107 kg (236 lb).     Return in about 1 week (around 2/23/2021) for recheck of current condition, if symptoms do not improve.    KRISTYN Lara [facility] SAM Alford is a 45 year old who presents for the following health issues     HPI       Concern - Lump in groin area   Onset: 2-3 days   Description: Patient states that he had a procedure done and since that time he has had to lumps in the groin area. One has drained on its own. This time patient is in a lot of discomfort   Intensity: mild  Progression of Symptoms:  same and constant  Accompanying Signs & Symptoms:   Previous history of similar problem: Yes has had this a couple times after surgery.   Precipitating factors:        Worsened by: NA  Alleviating factors:        Improved by: none  Therapies tried and outcome:  none     Review of Systems   Constitutional, HEENT, cardiovascular, pulmonary, GI, , musculoskeletal, neuro, skin, endocrine and psych systems are negative, except as otherwise noted.      Objective    /84 (BP Location: Left arm, Patient Position: Chair, Cuff Size: Adult Regular)   Pulse 88   Temp 98.9  F (37.2  C) (Temporal)   Resp 20   Ht 1.753 m (5' 9\")   Wt 107 kg " (236 lb)   SpO2 98%   BMI 34.85 kg/m    Body mass index is 34.85 kg/m .  Physical Exam   GENERAL: healthy, alert and no distress  RESP: lungs clear to auscultation - no rales, rhonchi or wheezes  CV: regular rate and rhythm, normal S1 S2, no S3 or S4, no murmur, click or rub, no peripheral edema and peripheral pulses strong  MS: no gross musculoskeletal defects noted, no edema  SKIN: no suspicious lesions or rashes with exception of what appears to be a perineal abscess to the left of midline.  This is tender and mobile.  Is approximately 2 cm in rough diameter today.  I do not appreciate a visible area of concentration that would lend itself to I&D today.  He describes discharge in the past as mucopurulent and quite odiferous.  More than likely this is a sebaceous cyst.  NEURO: Normal strength and tone, mentation intact and speech normal  PSYCH: mentation appears normal, affect normal/bright

## 2021-02-22 ENCOUNTER — TELEPHONE (OUTPATIENT)
Dept: SURGERY | Facility: OTHER | Age: 46
End: 2021-02-22

## 2021-02-22 ENCOUNTER — OFFICE VISIT (OUTPATIENT)
Dept: SURGERY | Facility: CLINIC | Age: 46
End: 2021-02-22
Attending: PHYSICIAN ASSISTANT
Payer: COMMERCIAL

## 2021-02-22 VITALS
SYSTOLIC BLOOD PRESSURE: 122 MMHG | BODY MASS INDEX: 35.32 KG/M2 | TEMPERATURE: 97.4 F | DIASTOLIC BLOOD PRESSURE: 76 MMHG | WEIGHT: 239.2 LBS

## 2021-02-22 DIAGNOSIS — E11.8 TYPE 2 DIABETES MELLITUS WITH COMPLICATION, WITHOUT LONG-TERM CURRENT USE OF INSULIN (H): ICD-10-CM

## 2021-02-22 DIAGNOSIS — Z11.59 ENCOUNTER FOR SCREENING FOR OTHER VIRAL DISEASES: ICD-10-CM

## 2021-02-22 DIAGNOSIS — L02.215 PERINEAL ABSCESS: ICD-10-CM

## 2021-02-22 DIAGNOSIS — N36.0 PERINEAL SINUS: Primary | ICD-10-CM

## 2021-02-22 PROCEDURE — 99244 OFF/OP CNSLTJ NEW/EST MOD 40: CPT | Performed by: SPECIALIST

## 2021-02-22 NOTE — PROGRESS NOTES
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  He had a VAC in place his perineum for significant time.  Since the wound closed up he had an intermittently draining sinus in the perineum.  It is worsened over the past 2 months and reports intermittent drainage and some pain.  He was started on antibiotics by his PCP.  He is now referred to me for evaluation of a possible perineal abscess.  Denies any fevers chills or rectal pain.  He states his blood sugars been controlled.  He feels he has been having spontaneously drained abscesses.    Past Medical History:   Diagnosis Date     Diabetes (H)      Past Surgical History:   Procedure Laterality Date     EXAM UNDER ANESTHESIA, CHANGE DRESSING (LOCATION), COMBINED N/A 1/15/2018    Procedure: COMBINED EXAM UNDER ANESTHESIA, CHANGE DRESSING (LOCATION);  Dressing Change and exam under Monitored Anesthesia Care with wound vac placement;  Surgeon: Stefania Munroe MD;  Location: [facility] OR     EXAM UNDER ANESTHESIA, CHANGE DRESSING (LOCATION), COMBINED N/A 1/18/2018    Procedure: COMBINED EXAM UNDER ANESTHESIA, CHANGE DRESSING (LOCATION);  Serial wound vac change perineal;  Surgeon: Stefania Munroe MD;  Location: [facility] OR     IRRIGATION AND DEBRIDEMENT PERINEAL, COMBINED N/A 1/13/2018    Procedure: COMBINED IRRIGATION AND DEBRIDEMENT PERINEAL;  Irrigation and Debridement of perianal abscess;  Surgeon: Stefania Munroe MD;  Location: [facility] OR     IRRIGATION AND DEBRIDEMENT PERINEAL, COMBINED N/A 1/14/2018    Procedure: COMBINED IRRIGATION AND DEBRIDEMENT PERINEAL;  combined irrigation and debridement perineal and dressing change;  Surgeon: Stefania Munroe MD;  Location: [facility] OR     ORTHOPEDIC SURGERY       TONSILLECTOMY, ADENOIDECTOMY, COMBINED        Current Outpatient Medications   Medication     acetaminophen (TYLENOL) 325 MG tablet     Applicators (COTTON SWABS) SWAB     aspirin 81 MG tablet     atorvastatin (LIPITOR) 20 MG tablet     Bisacodyl (LAXATIVE PO)     blood glucose (NO BRAND SPECIFIED) lancets standard     blood glucose monitoring (NO BRAND SPECIFIED) meter device kit     blood glucose monitoring (NO BRAND SPECIFIED) meter device kit     cetirizine (ZYRTEC) 10 MG tablet     gabapentin (NEURONTIN) 300 MG capsule     glipiZIDE (GLUCOTROL XL) 5 MG 24 hr tablet     Lancets (ONETOUCH DELICA PLUS [id number]) MISC     lisinopril (ZESTRIL) 20 MG tablet     metFORMIN (GLUCOPHAGE-XR) 500 MG 24 hr tablet     ONETOUCH ULTRA test strip     sulfamethoxazole-trimethoprim (BACTRIM DS) 800-160 MG tablet     triamcinolone (KENALOG) 0.1 % external ointment     No current facility-administered medications for this visit.         Allergies   Allergen Reactions     Seasonal Allergies      Sneezing, itchy eyes       Social History     Socioeconomic History     Marital status: Single     Spouse name: Not on file     Number of children: Not on file     Years of education: Not on file     Highest education level: Not on file   Occupational History     Not on file   Social Needs     Financial resource strain: Not on file     Food insecurity     Worry: Not on file     Inability: Not on file     Transportation needs     Medical: Not on file     Non-medical: Not on file   Tobacco Use     Smoking status: Current Some Day Smoker     Types: Dip, chew, snus or snuff, Cigarettes     Smokeless tobacco: Current User   Substance and Sexual Activity     Alcohol use: Yes     Drug use: No     Sexual activity: Yes     Partners: Female     Birth control/protection: None   Lifestyle     Physical activity     Days per week: Not on file     Minutes per session: Not on file     Stress: Not on file   Relationships     Social connections     Talks on phone: Not on file     Gets together: Not  on file     Attends Judaism service: Not on file     Active member of club or organization: Not on file     Attends meetings of clubs or organizations: Not on file     Relationship status: Not on file     Intimate partner violence     Fear of current or ex partner: Not on file     Emotionally abused: Not on file     Physically abused: Not on file     Forced sexual activity: Not on file   Other Topics Concern     Parent/sibling w/ CABG, MI or angioplasty before 65F 55M? Not Asked   Social History Narrative     Not on file     No family history on file.     ROS: 10 point ROS neg other than the symptoms noted above in the HPI.    PE:  B/P: 122/76, T: 97.4, P: Data Unavailable, R: Data Unavailable  General: well developed, well nourished WM who appears his stated age  HEENT: NC/AT, EOMI, (-)icterus, (-)injection  Neck: Supple, No JVD  Chest: CTA  Heart: S1, S2, (-)m/r/g  Abd: Soft, non tender, non distended, non tender, no masses   - open sinus midline tunneling posterior and to the left.  Serous fluid expressed.  Rectal: No masses  Ext; Warm, no edema  Psych: AAOx3  Neuro: No focal deficits    Impression/plan:  There is a 45-year-old gentleman with diabetes and a history of Marley's gangrene who developed a perineal sinus that intermittently obstructs becomes infected.  I was able to follow the tract in the clinic but he will need a more extensive debridement.  After discussion with the patient the plan at this time is taken the operating for a unroofing of his sinus and further debridement.   The procedure, risks, benefits, and alternatives were discussed and the patient agrees to proceed.  He will be scheduled the near future.      Antwan Macias MD, FACS

## 2021-02-22 NOTE — LETTER
2/22/2021         RE: Estevan Callejas  [address] [facility] [address]        Dear Colleague,    Thank you for referring your patient, Estevan Callejas, to the [facility]. Please see a copy of my visit note below.    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  He had a VAC in place his perineum for significant time.  Since the wound closed up he had an intermittently draining sinus in the perineum.  It is worsened over the past 2 months and reports intermittent drainage and some pain.  He was started on antibiotics by his PCP.  He is now referred to me for evaluation of a possible perineal abscess.  Denies any fevers chills or rectal pain.  He states his blood sugars been controlled.  He feels he has been having spontaneously drained abscesses.    Past Medical History:   Diagnosis Date     Diabetes (H)      Past Surgical History:   Procedure Laterality Date     EXAM UNDER ANESTHESIA, CHANGE DRESSING (LOCATION), COMBINED N/A 1/15/2018    Procedure: COMBINED EXAM UNDER ANESTHESIA, CHANGE DRESSING (LOCATION);  Dressing Change and exam under Monitored Anesthesia Care with wound vac placement;  Surgeon: Stefania Munroe MD;  Location: [facility] OR     EXAM UNDER ANESTHESIA, CHANGE DRESSING (LOCATION), COMBINED N/A 1/18/2018    Procedure: COMBINED EXAM UNDER ANESTHESIA, CHANGE DRESSING (LOCATION);  Serial wound vac change perineal;  Surgeon: Stefania Munroe MD;  Location: [facility] OR     IRRIGATION AND DEBRIDEMENT PERINEAL, COMBINED N/A 1/13/2018    Procedure: COMBINED IRRIGATION AND DEBRIDEMENT PERINEAL;  Irrigation and Debridement of perianal abscess;  Surgeon: Stefania Munroe MD;  Location: [facility] OR     IRRIGATION AND DEBRIDEMENT PERINEAL, COMBINED N/A 1/14/2018    Procedure:  COMBINED IRRIGATION AND DEBRIDEMENT PERINEAL;  combined irrigation and debridement perineal and dressing change;  Surgeon: Stefania Munroe MD;  Location: [facility] OR     ORTHOPEDIC SURGERY       TONSILLECTOMY, ADENOIDECTOMY, COMBINED       Current Outpatient Medications   Medication     acetaminophen (TYLENOL) 325 MG tablet     Applicators (COTTON SWABS) SWAB     aspirin 81 MG tablet     atorvastatin (LIPITOR) 20 MG tablet     Bisacodyl (LAXATIVE PO)     blood glucose (NO BRAND SPECIFIED) lancets standard     blood glucose monitoring (NO BRAND SPECIFIED) meter device kit     blood glucose monitoring (NO BRAND SPECIFIED) meter device kit     cetirizine (ZYRTEC) 10 MG tablet     gabapentin (NEURONTIN) 300 MG capsule     glipiZIDE (GLUCOTROL XL) 5 MG 24 hr tablet     Lancets (ONETOUCH DELICA PLUS [id number]) MISC     lisinopril (ZESTRIL) 20 MG tablet     metFORMIN (GLUCOPHAGE-XR) 500 MG 24 hr tablet     ONETOUCH ULTRA test strip     sulfamethoxazole-trimethoprim (BACTRIM DS) 800-160 MG tablet     triamcinolone (KENALOG) 0.1 % external ointment     No current facility-administered medications for this visit.         Allergies   Allergen Reactions     Seasonal Allergies      Sneezing, itchy eyes       Social History     Socioeconomic History     Marital status: Single     Spouse name: Not on file     Number of children: Not on file     Years of education: Not on file     Highest education level: Not on file   Occupational History     Not on file   Social Needs     Financial resource strain: Not on file     Food insecurity     Worry: Not on file     Inability: Not on file     Transportation needs     Medical: Not on file     Non-medical: Not on file   Tobacco Use     Smoking status: Current Some Day Smoker     Types: Dip, chew, snus or snuff, Cigarettes     Smokeless tobacco: Current User   Substance and Sexual Activity     Alcohol use: Yes     Drug use: No     Sexual activity: Yes     Partners: Female     Birth  control/protection: None   Lifestyle     Physical activity     Days per week: Not on file     Minutes per session: Not on file     Stress: Not on file   Relationships     Social connections     Talks on phone: Not on file     Gets together: Not on file     Attends Restorationist service: Not on file     Active member of club or organization: Not on file     Attends meetings of clubs or organizations: Not on file     Relationship status: Not on file     Intimate partner violence     Fear of current or ex partner: Not on file     Emotionally abused: Not on file     Physically abused: Not on file     Forced sexual activity: Not on file   Other Topics Concern     Parent/sibling w/ CABG, MI or angioplasty before 65F 55M? Not Asked   Social History Narrative     Not on file     No family history on file.     ROS: 10 point ROS neg other than the symptoms noted above in the HPI.    PE:  B/P: 122/76, T: 97.4, P: Data Unavailable, R: Data Unavailable  General: well developed, well nourished WM who appears his stated age  HEENT: NC/AT, EOMI, (-)icterus, (-)injection  Neck: Supple, No JVD  Chest: CTA  Heart: S1, S2, (-)m/r/g  Abd: Soft, non tender, non distended, non tender, no masses   - open sinus midline tunneling posterior and to the left.  Serous fluid expressed.  Rectal: No masses  Ext; Warm, no edema  Psych: AAOx3  Neuro: No focal deficits    Impression/plan:  There is a 45-year-old gentleman with diabetes and a history of Marley's gangrene who developed a perineal sinus that intermittently obstructs becomes infected.  I was able to follow the tract in the clinic but he will need a more extensive debridement.  After discussion with the patient the plan at this time is taken the operating for a unroofing of his sinus and further debridement.   The procedure, risks, benefits, and alternatives were discussed and the patient agrees to proceed.  He will be scheduled the near future.      Antwan Macias MD, FACS        Again,  thank you for allowing me to participate in the care of your patient.        Sincerely,        Antwan Macias MD

## 2021-02-24 NOTE — PATIENT INSTRUCTIONS

## 2021-02-24 NOTE — PROGRESS NOTES
[address]  [facility] [address]  Phone: [phone]  Primary Provider: Lawanda Barrera        PREOPERATIVE EVALUATION:  Today's date: 2/26/2021    Estevan Callejas is a 45 year old male who presents for a preoperative evaluation.    Surgical Information:  Surgery/Procedure: INCISION AND DRAINAGE, PERINEUM  Surgery Location: [facility]  Surgeon: Dr. Macias  Surgery Date: 3/1/21  Time of Surgery: 1:50 pm  Where patient plans to recover: At home with family  Fax number for surgical facility: Note does not need to be faxed, will be available electronically in Epic.    Type of Anesthesia Anticipated: General    Assessment & Plan     The proposed surgical procedure is considered LOW risk.    Problem List Items Addressed This Visit     Type 2 diabetes mellitus with complication, without long-term current use of insulin (H)    Relevant Orders    Hemoglobin A1c (Completed)    Basic metabolic panel  (Ca, Cl, CO2, Creat, Gluc, K, Na, BUN) (Completed)    CBC with platelets (Completed)    Perineal abscess    Relevant Orders    Hemoglobin A1c (Completed)    Basic metabolic panel  (Ca, Cl, CO2, Creat, Gluc, K, Na, BUN) (Completed)    CBC with platelets (Completed)    Tobacco use disorder    Microalbuminuria    Benign essential hypertension      Other Visit Diagnoses     Preop general physical exam    -  Primary    Relevant Orders    Hemoglobin A1c (Completed)    Basic metabolic panel  (Ca, Cl, CO2, Creat, Gluc, K, Na, BUN) (Completed)    CBC with platelets (Completed)               Risks and Recommendations:  The patient has the following additional risks and recommendations for perioperative complications:  Diabetes:  - Patient is not on insulin therapy: regular NPO guidelines can be followed.   Social and Substance:    - Active nicotine user, advised smoking cessation    Medication Instructions:   - ACE/ARB: May be continued on the day of surgery.    - Statins: Continue  taking on the day of surgery.    - metformin: HOLD day of surgery.   - sulfonylurea (e.g. glyburide, glipizide): HOLD day of surgery    RECOMMENDATION:  APPROVAL GIVEN to proceed with proposed procedure, without further diagnostic evaluation.          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 Here for pre-op eval      Preop Questions 2/26/2021   1. Have you ever had a heart attack or stroke? No   2. Have you ever had surgery on your heart or blood vessels, such as a stent placement, a coronary artery bypass, or surgery on an artery in your head, neck, heart, or legs? No   3. Do you have chest pain with activity? No   4. Do you have a history of  heart failure? No   5. Do you currently have a cold, bronchitis or symptoms of other infection? No   6. Do you have a cough, shortness of breath, or wheezing? No   7. Do you or anyone in your family have previous history of blood clots? No   8. Do you or does anyone in your family have a serious bleeding problem such as prolonged bleeding following surgeries or cuts? No   9. Have you ever had problems with anemia or been told to take iron pills? No   10. Have you had any abnormal blood loss such as black, tarry or bloody stools? No   11. Have you ever had a blood transfusion? No   12. Are you willing to have a blood transfusion if it is medically needed before, during, or after your surgery? Yes   13. Have you or any of your relatives ever had problems with anesthesia? No   14. Do you have sleep apnea, excessive snoring or daytime drowsiness? No   15. Do you have any artifical heart valves or other implanted medical devices like a pacemaker, defibrillator, or continuous glucose monitor? No   16. Do you have artificial joints? No   17. Are you allergic to latex? No     Health Care Directive:  Patient does  not have a Health Care Directive or Living Will: Discussed advance care planning with patient; information given to patient to review.    Preoperative Review of :   reviewed - no record of controlled substances prescribed.      Status of Chronic Conditions:  DIABETES - Patient has a longstanding history of DiabetesType Type II . Patient is being treated with oral agents and denies significant side effects. Control has been good. Complicating factors include but are not limited to: hypertension.     HYPERLIPIDEMIA - Patient has a long history of significant Hyperlipidemia requiring medication for treatment with recent good control. Patient reports no problems or side effects with the medication.     HYPERTENSION - Patient has longstanding history of HTN , currently denies any symptoms referable to elevated blood pressure. Specifically denies chest pain, palpitations, dyspnea, orthopnea, PND or peripheral edema. Blood pressure readings have been in normal range. Current medication regimen is as listed below. Patient denies any side effects of medication.       Review of Systems  CONSTITUTIONAL: NEGATIVE for fever, chills, change in weight  INTEGUMENTARY/SKIN: NEGATIVE for worrisome rashes, moles or lesions  EYES: NEGATIVE for vision changes or irritation  ENT/MOUTH: NEGATIVE for ear, mouth and throat problems  RESP: NEGATIVE for significant cough or SOB  BREAST: NEGATIVE for masses, tenderness or discharge  CV: NEGATIVE for chest pain, palpitations or peripheral edema  GI: NEGATIVE for nausea, abdominal pain, heartburn, or change in bowel habits  : NEGATIVE for frequency, dysuria, or hematuria  MUSCULOSKELETAL: NEGATIVE for significant arthralgias or myalgia  NEURO: NEGATIVE for weakness, dizziness or paresthesias  ENDOCRINE: NEGATIVE for temperature intolerance, skin/hair changes  HEME: NEGATIVE for bleeding problems  PSYCHIATRIC: NEGATIVE for changes in mood or affect    Patient Active Problem List     Diagnosis Date Noted     Perineal sinus 02/22/2021     Priority: Medium     Added automatically from request for surgery [id number]       Benign essential hypertension 10/16/2020     Priority: Medium     Microalbuminuria 07/12/2020     Priority: Medium     Tobacco use disorder 07/01/2018     Priority: Medium     Epidermal cyst of face 01/30/2018     Priority: Medium     Perineal abscess 01/13/2018     Priority: Medium     Morbid obesity (H) 06/12/2017     Priority: Medium     Low back pain 03/06/2015     Priority: Medium     Type 2 diabetes mellitus with complication, without long-term current use of insulin (H) 02/25/2015     Priority: Medium     Peripheral neuropathy - near S2 dermatome 02/18/2015     Priority: Medium      Past Medical History:   Diagnosis Date     Diabetes (H)      Past Surgical History:   Procedure Laterality Date     EXAM UNDER ANESTHESIA, CHANGE DRESSING (LOCATION), COMBINED N/A 1/15/2018    Procedure: COMBINED EXAM UNDER ANESTHESIA, CHANGE DRESSING (LOCATION);  Dressing Change and exam under Monitored Anesthesia Care with wound vac placement;  Surgeon: Stefania Munroe MD;  Location: [facility] OR     EXAM UNDER ANESTHESIA, CHANGE DRESSING (LOCATION), COMBINED N/A 1/18/2018    Procedure: COMBINED EXAM UNDER ANESTHESIA, CHANGE DRESSING (LOCATION);  Serial wound vac change perineal;  Surgeon: Stefania Munroe MD;  Location: [facility] OR     IRRIGATION AND DEBRIDEMENT PERINEAL, COMBINED N/A 1/13/2018    Procedure: COMBINED IRRIGATION AND DEBRIDEMENT PERINEAL;  Irrigation and Debridement of perianal abscess;  Surgeon: Stefania Munroe MD;  Location: [facility] OR     IRRIGATION AND DEBRIDEMENT PERINEAL, COMBINED N/A 1/14/2018    Procedure: COMBINED IRRIGATION AND DEBRIDEMENT PERINEAL;  combined irrigation and debridement perineal and dressing change;  Surgeon: Stefania Munroe MD;  Location: [facility] OR     ORTHOPEDIC SURGERY       TONSILLECTOMY, ADENOIDECTOMY, COMBINED        Current Outpatient Medications   Medication Sig Dispense Refill     acetaminophen (TYLENOL) 325 MG tablet Take 2 tablets (650 mg) by mouth every 4 hours as needed for other (multimodal surgical pain management along with NSAIDS and opioid medication as indicated based on pain control and physical function.) 100 tablet      Applicators (COTTON SWABS) SWAB 1 each as needed 100 each 3     aspirin 81 MG tablet Take 1 tablet (81 mg) by mouth daily 90 tablet 3     atorvastatin (LIPITOR) 20 MG tablet TAKE ONE TABLET BY MOUTH once daily 90 tablet 3     Bisacodyl (LAXATIVE PO)        blood glucose (NO BRAND SPECIFIED) lancets standard Use to test blood sugar one times daily or as directed. 100 each 3     blood glucose monitoring (NO BRAND SPECIFIED) meter device kit Use to test blood sugar one  times daily or as directed. Preferred blood glucose meter OR supplies to accompany: Blood Glucose Monitor Brands: per insurance.One touch ultra 2 meter 1 kit 0     blood glucose monitoring (NO BRAND SPECIFIED) meter device kit Use to test blood sugar one times daily or as directed. 1 kit 0     gabapentin (NEURONTIN) 300 MG capsule Take 600 mg by mouth 3 times daily as needed        glipiZIDE (GLUCOTROL XL) 5 MG 24 hr tablet Take 1 tablet by mouth daily 90 tablet 1     Lancets (ONETOUCH DELICA PLUS [id number]) MISC use to test blood sugars ONCE daily OR as directed 50 each 6     lisinopril (ZESTRIL) 20 MG tablet Take 1 tablet by mouth daily 90 tablet 3     metFORMIN (GLUCOPHAGE-XR) 500 MG 24 hr tablet Take 2 tablets (1,000 mg) by mouth daily with dinner 180 tablet 0     ONETOUCH ULTRA test strip Use to test blood glucose 1 time daily or as directed. 100 strip 3     sulfamethoxazole-trimethoprim (BACTRIM DS) 800-160 MG tablet Take 1 tablet by mouth 2 times daily 20 tablet 0       Allergies   Allergen Reactions     Seasonal Allergies      Sneezing, itchy eyes        Social History     Tobacco Use     Smoking status: Current Some Day  "Smoker     Types: Dip, chew, snus or snuff, Cigarettes     Smokeless tobacco: Current User   Substance Use Topics     Alcohol use: Yes     History reviewed. No pertinent family history.  History   Drug Use No         Objective     /70   Pulse 77   Temp 98.2  F (36.8  C) (Temporal)   Ht 1.776 m (5' 9.92\")   Wt 108.9 kg (240 lb)   SpO2 98%   BMI 34.51 kg/m      Physical Exam    GENERAL APPEARANCE: healthy, alert and no distress     EYES: EOMI,  PERRL     HENT: ear canals and TM's normal and nose and mouth without ulcers or lesions     NECK: no adenopathy, no asymmetry, masses, or scars and thyroid normal to palpation     RESP: lungs clear to auscultation - no rales, rhonchi or wheezes     CV: regular rates and rhythm, normal S1 S2, no S3 or S4 and no murmur, click or rub     ABDOMEN:  soft, nontender, no HSM or masses and bowel sounds normal     MS: extremities normal- no gross deformities noted, no evidence of inflammation in joints, FROM in all extremities.     SKIN: no suspicious lesions or rashes     NEURO: Normal strength and tone, sensory exam grossly normal, mentation intact and speech normal     PSYCH: mentation appears normal. and affect normal/bright     LYMPHATICS: No cervical adenopathy    Recent Labs   Lab Test 10/16/20  1445 07/08/20  1530 11/14/19  0924 11/13/19  1226   HGB  --   --  15.2 15.3   PLT  --   --  200 203   NA  --  140 142  --    POTASSIUM  --  4.0 4.1  --    CR  --  0.68 0.58*  --    A1C 5.3 6.0*  --   --         Diagnostics:  Labs pending at this time.  Results will be reviewed when available.   No EKG required, no history of coronary heart disease, significant arrhythmia, peripheral arterial disease or other structural heart disease.    Revised Cardiac Risk Index (RCRI):  The patient has the following serious cardiovascular risks for perioperative complications:   - No serious cardiac risks = 0 points     RCRI Interpretation: 0 points: Class I (very low risk - 0.4% " complication rate)             Signed Electronically by: Lawanda Barrera MD  Copy of this evaluation report is provided to requesting physician.    Preop [facility] Preop Guidelines    Revised Cardiac Risk Index

## 2021-02-26 ENCOUNTER — OFFICE VISIT (OUTPATIENT)
Dept: FAMILY MEDICINE | Facility: OTHER | Age: 46
End: 2021-02-26
Payer: COMMERCIAL

## 2021-02-26 VITALS
HEIGHT: 70 IN | OXYGEN SATURATION: 98 % | TEMPERATURE: 98.2 F | WEIGHT: 240 LBS | HEART RATE: 77 BPM | SYSTOLIC BLOOD PRESSURE: 138 MMHG | BODY MASS INDEX: 34.36 KG/M2 | DIASTOLIC BLOOD PRESSURE: 70 MMHG

## 2021-02-26 DIAGNOSIS — F17.200 TOBACCO USE DISORDER: ICD-10-CM

## 2021-02-26 DIAGNOSIS — E11.8 TYPE 2 DIABETES MELLITUS WITH COMPLICATION, WITHOUT LONG-TERM CURRENT USE OF INSULIN (H): ICD-10-CM

## 2021-02-26 DIAGNOSIS — Z01.818 PREOP GENERAL PHYSICAL EXAM: Primary | ICD-10-CM

## 2021-02-26 DIAGNOSIS — Z11.59 ENCOUNTER FOR SCREENING FOR OTHER VIRAL DISEASES: ICD-10-CM

## 2021-02-26 DIAGNOSIS — R80.9 MICROALBUMINURIA: ICD-10-CM

## 2021-02-26 DIAGNOSIS — L02.215 PERINEAL ABSCESS: ICD-10-CM

## 2021-02-26 DIAGNOSIS — I10 BENIGN ESSENTIAL HYPERTENSION: ICD-10-CM

## 2021-02-26 LAB
ANION GAP SERPL CALCULATED.3IONS-SCNC: 4 MMOL/L (ref 3–14)
BUN SERPL-MCNC: 12 MG/DL (ref 7–30)
CALCIUM SERPL-MCNC: 9.1 MG/DL (ref 8.5–10.1)
CHLORIDE SERPL-SCNC: 102 MMOL/L (ref 94–109)
CO2 SERPL-SCNC: 28 MMOL/L (ref 20–32)
CREAT SERPL-MCNC: 0.89 MG/DL (ref 0.66–1.25)
ERYTHROCYTE [DISTWIDTH] IN BLOOD BY AUTOMATED COUNT: 12.7 % (ref 10–15)
GFR SERPL CREATININE-BSD FRML MDRD: >90 ML/MIN/{1.73_M2}
GLUCOSE SERPL-MCNC: 83 MG/DL (ref 70–99)
HBA1C MFR BLD: 5.6 % (ref 0–5.6)
HCT VFR BLD AUTO: 44.8 % (ref 40–53)
HGB BLD-MCNC: 14.8 G/DL (ref 13.3–17.7)
LABORATORY COMMENT REPORT: NORMAL
MCH RBC QN AUTO: 31.7 PG (ref 26.5–33)
MCHC RBC AUTO-ENTMCNC: 33 G/DL (ref 31.5–36.5)
MCV RBC AUTO: 96 FL (ref 78–100)
PLATELET # BLD AUTO: 232 10E9/L (ref 150–450)
POTASSIUM SERPL-SCNC: 4.7 MMOL/L (ref 3.4–5.3)
RBC # BLD AUTO: 4.67 10E12/L (ref 4.4–5.9)
SARS-COV-2 RNA RESP QL NAA+PROBE: NEGATIVE
SARS-COV-2 RNA RESP QL NAA+PROBE: NORMAL
SODIUM SERPL-SCNC: 134 MMOL/L (ref 133–144)
SPECIMEN SOURCE: NORMAL
SPECIMEN SOURCE: NORMAL
WBC # BLD AUTO: 10.2 10E9/L (ref 4–11)

## 2021-02-26 PROCEDURE — 80048 BASIC METABOLIC PNL TOTAL CA: CPT | Performed by: FAMILY MEDICINE

## 2021-02-26 PROCEDURE — 87635 SARS-COV-2 COVID-19 AMP PRB: CPT | Performed by: SPECIALIST

## 2021-02-26 PROCEDURE — 85027 COMPLETE CBC AUTOMATED: CPT | Performed by: FAMILY MEDICINE

## 2021-02-26 PROCEDURE — 83036 HEMOGLOBIN GLYCOSYLATED A1C: CPT | Performed by: FAMILY MEDICINE

## 2021-02-26 PROCEDURE — 36415 COLL VENOUS BLD VENIPUNCTURE: CPT | Performed by: FAMILY MEDICINE

## 2021-02-26 PROCEDURE — 99214 OFFICE O/P EST MOD 30 MIN: CPT | Performed by: FAMILY MEDICINE

## 2021-02-26 ASSESSMENT — MIFFLIN-ST. JEOR: SCORE: 1983.63

## 2021-03-01 ENCOUNTER — HOSPITAL ENCOUNTER (OUTPATIENT)
Facility: CLINIC | Age: 46
Discharge: HOME | End: 2021-03-01
Attending: SPECIALIST | Admitting: SPECIALIST
Payer: COMMERCIAL

## 2021-03-01 ENCOUNTER — ANESTHESIA EVENT (OUTPATIENT)
Dept: SURGERY | Facility: CLINIC | Age: 46
End: 2021-03-01
Payer: COMMERCIAL

## 2021-03-01 ENCOUNTER — ANESTHESIA (OUTPATIENT)
Dept: SURGERY | Facility: CLINIC | Age: 46
End: 2021-03-01
Payer: COMMERCIAL

## 2021-03-01 VITALS
BODY MASS INDEX: 34.51 KG/M2 | TEMPERATURE: 97.7 F | RESPIRATION RATE: 16 BRPM | SYSTOLIC BLOOD PRESSURE: 124 MMHG | WEIGHT: 240 LBS | DIASTOLIC BLOOD PRESSURE: 77 MMHG | OXYGEN SATURATION: 97 % | HEART RATE: 56 BPM

## 2021-03-01 DIAGNOSIS — N36.0 PERINEAL SINUS: ICD-10-CM

## 2021-03-01 DIAGNOSIS — L02.215 PERINEAL ABSCESS: ICD-10-CM

## 2021-03-01 DIAGNOSIS — G89.18 POST-OP PAIN: Primary | ICD-10-CM

## 2021-03-01 LAB
GLUCOSE BLDC GLUCOMTR-MCNC: 113 MG/DL (ref 70–99)
GLUCOSE BLDC GLUCOMTR-MCNC: 116 MG/DL (ref 70–99)

## 2021-03-01 PROCEDURE — 250N000009 HC RX 250: Performed by: SPECIALIST

## 2021-03-01 PROCEDURE — 82962 GLUCOSE BLOOD TEST: CPT

## 2021-03-01 PROCEDURE — 250N000025 HC SEVOFLURANE, PER MIN: Performed by: SPECIALIST

## 2021-03-01 PROCEDURE — 710N000010 HC RECOVERY PHASE 1, LEVEL 2, PER MIN: Performed by: SPECIALIST

## 2021-03-01 PROCEDURE — 272N000001 HC OR GENERAL SUPPLY STERILE: Performed by: SPECIALIST

## 2021-03-01 PROCEDURE — 250N000013 HC RX MED GY IP 250 OP 250 PS 637: Performed by: NURSE ANESTHETIST, CERTIFIED REGISTERED

## 2021-03-01 PROCEDURE — 360N000075 HC SURGERY LEVEL 2, PER MIN: Performed by: SPECIALIST

## 2021-03-01 PROCEDURE — 250N000009 HC RX 250: Performed by: NURSE ANESTHETIST, CERTIFIED REGISTERED

## 2021-03-01 PROCEDURE — 88305 TISSUE EXAM BY PATHOLOGIST: CPT | Mod: TC | Performed by: SPECIALIST

## 2021-03-01 PROCEDURE — 999N000141 HC STATISTIC PRE-PROCEDURE NURSING ASSESSMENT: Performed by: SPECIALIST

## 2021-03-01 PROCEDURE — 710N000012 HC RECOVERY PHASE 2, PER MINUTE: Performed by: SPECIALIST

## 2021-03-01 PROCEDURE — 250N000011 HC RX IP 250 OP 636: Performed by: SPECIALIST

## 2021-03-01 PROCEDURE — 370N000017 HC ANESTHESIA TECHNICAL FEE, PER MIN: Performed by: SPECIALIST

## 2021-03-01 PROCEDURE — 10060 I&D ABSCESS SIMPLE/SINGLE: CPT | Performed by: SPECIALIST

## 2021-03-01 PROCEDURE — 258N000003 HC RX IP 258 OP 636: Performed by: NURSE ANESTHETIST, CERTIFIED REGISTERED

## 2021-03-01 PROCEDURE — 88305 TISSUE EXAM BY PATHOLOGIST: CPT | Mod: 26 | Performed by: PATHOLOGY

## 2021-03-01 PROCEDURE — 250N000011 HC RX IP 250 OP 636: Performed by: NURSE ANESTHETIST, CERTIFIED REGISTERED

## 2021-03-01 RX ORDER — FENTANYL CITRATE 50 UG/ML
25-50 INJECTION, SOLUTION INTRAMUSCULAR; INTRAVENOUS
Status: DISCONTINUED | OUTPATIENT
Start: 2021-03-01 | End: 2021-03-01 | Stop reason: HOSPADM

## 2021-03-01 RX ORDER — NALOXONE HYDROCHLORIDE 0.4 MG/ML
0.4 INJECTION, SOLUTION INTRAMUSCULAR; INTRAVENOUS; SUBCUTANEOUS
Status: DISCONTINUED | OUTPATIENT
Start: 2021-03-01 | End: 2021-03-01 | Stop reason: HOSPADM

## 2021-03-01 RX ORDER — FENTANYL CITRATE 50 UG/ML
25-50 INJECTION, SOLUTION INTRAMUSCULAR; INTRAVENOUS
Status: CANCELLED | OUTPATIENT
Start: 2021-03-01

## 2021-03-01 RX ORDER — PROPOFOL 10 MG/ML
INJECTION, EMULSION INTRAVENOUS PRN
Status: DISCONTINUED | OUTPATIENT
Start: 2021-03-01 | End: 2021-03-01

## 2021-03-01 RX ORDER — OXYCODONE AND ACETAMINOPHEN 5; 325 MG/1; MG/1
2 TABLET ORAL
Status: DISCONTINUED | OUTPATIENT
Start: 2021-03-01 | End: 2021-03-01 | Stop reason: HOSPADM

## 2021-03-01 RX ORDER — HYDROMORPHONE HYDROCHLORIDE 1 MG/ML
.3-.5 INJECTION, SOLUTION INTRAMUSCULAR; INTRAVENOUS; SUBCUTANEOUS EVERY 10 MIN PRN
Status: DISCONTINUED | OUTPATIENT
Start: 2021-03-01 | End: 2021-03-01 | Stop reason: HOSPADM

## 2021-03-01 RX ORDER — ONDANSETRON 4 MG/1
4 TABLET, ORALLY DISINTEGRATING ORAL EVERY 30 MIN PRN
Status: DISCONTINUED | OUTPATIENT
Start: 2021-03-01 | End: 2021-03-01 | Stop reason: HOSPADM

## 2021-03-01 RX ORDER — SODIUM CHLORIDE, SODIUM LACTATE, POTASSIUM CHLORIDE, CALCIUM CHLORIDE 600; 310; 30; 20 MG/100ML; MG/100ML; MG/100ML; MG/100ML
INJECTION, SOLUTION INTRAVENOUS CONTINUOUS
Status: DISCONTINUED | OUTPATIENT
Start: 2021-03-01 | End: 2021-03-01 | Stop reason: HOSPADM

## 2021-03-01 RX ORDER — NALOXONE HYDROCHLORIDE 0.4 MG/ML
0.2 INJECTION, SOLUTION INTRAMUSCULAR; INTRAVENOUS; SUBCUTANEOUS
Status: DISCONTINUED | OUTPATIENT
Start: 2021-03-01 | End: 2021-03-01 | Stop reason: HOSPADM

## 2021-03-01 RX ORDER — CEFAZOLIN SODIUM 2 G/100ML
2 INJECTION, SOLUTION INTRAVENOUS SEE ADMIN INSTRUCTIONS
Status: DISCONTINUED | OUTPATIENT
Start: 2021-03-01 | End: 2021-03-01 | Stop reason: HOSPADM

## 2021-03-01 RX ORDER — DEXAMETHASONE SODIUM PHOSPHATE 10 MG/ML
INJECTION, SOLUTION INTRAMUSCULAR; INTRAVENOUS PRN
Status: DISCONTINUED | OUTPATIENT
Start: 2021-03-01 | End: 2021-03-01

## 2021-03-01 RX ORDER — CEFAZOLIN SODIUM 2 G/100ML
2 INJECTION, SOLUTION INTRAVENOUS
Status: DISCONTINUED | OUTPATIENT
Start: 2021-03-01 | End: 2021-03-01 | Stop reason: HOSPADM

## 2021-03-01 RX ORDER — LIDOCAINE 40 MG/G
CREAM TOPICAL
Status: DISCONTINUED | OUTPATIENT
Start: 2021-03-01 | End: 2021-03-01 | Stop reason: HOSPADM

## 2021-03-01 RX ORDER — DIMENHYDRINATE 50 MG/ML
25 INJECTION, SOLUTION INTRAMUSCULAR; INTRAVENOUS
Status: DISCONTINUED | OUTPATIENT
Start: 2021-03-01 | End: 2021-03-01 | Stop reason: HOSPADM

## 2021-03-01 RX ORDER — BUPIVACAINE HYDROCHLORIDE AND EPINEPHRINE 5; 5 MG/ML; UG/ML
INJECTION, SOLUTION PERINEURAL PRN
Status: DISCONTINUED | OUTPATIENT
Start: 2021-03-01 | End: 2021-03-01 | Stop reason: HOSPADM

## 2021-03-01 RX ORDER — OXYCODONE HYDROCHLORIDE 5 MG/1
5 TABLET ORAL EVERY 4 HOURS PRN
Status: DISCONTINUED | OUTPATIENT
Start: 2021-03-01 | End: 2021-03-01 | Stop reason: HOSPADM

## 2021-03-01 RX ORDER — ONDANSETRON 2 MG/ML
4 INJECTION INTRAMUSCULAR; INTRAVENOUS EVERY 30 MIN PRN
Status: DISCONTINUED | OUTPATIENT
Start: 2021-03-01 | End: 2021-03-01 | Stop reason: HOSPADM

## 2021-03-01 RX ORDER — LIDOCAINE HYDROCHLORIDE 20 MG/ML
INJECTION, SOLUTION INFILTRATION; PERINEURAL PRN
Status: DISCONTINUED | OUTPATIENT
Start: 2021-03-01 | End: 2021-03-01

## 2021-03-01 RX ORDER — OXYCODONE HYDROCHLORIDE 5 MG/1
5-10 TABLET ORAL EVERY 6 HOURS PRN
Qty: 12 TABLET | Refills: 0 | Status: SHIPPED | OUTPATIENT
Start: 2021-03-01 | End: 2022-03-22

## 2021-03-01 RX ORDER — FENTANYL CITRATE 50 UG/ML
INJECTION, SOLUTION INTRAMUSCULAR; INTRAVENOUS PRN
Status: DISCONTINUED | OUTPATIENT
Start: 2021-03-01 | End: 2021-03-01

## 2021-03-01 RX ORDER — ONDANSETRON 2 MG/ML
INJECTION INTRAMUSCULAR; INTRAVENOUS PRN
Status: DISCONTINUED | OUTPATIENT
Start: 2021-03-01 | End: 2021-03-01

## 2021-03-01 RX ADMIN — OXYCODONE HYDROCHLORIDE 5 MG: 5 TABLET ORAL at 14:41

## 2021-03-01 RX ADMIN — FENTANYL CITRATE 50 MCG: 50 INJECTION, SOLUTION INTRAMUSCULAR; INTRAVENOUS at 13:16

## 2021-03-01 RX ADMIN — PROPOFOL 200 MG: 10 INJECTION, EMULSION INTRAVENOUS at 13:16

## 2021-03-01 RX ADMIN — MIDAZOLAM 2 MG: 1 INJECTION INTRAMUSCULAR; INTRAVENOUS at 13:11

## 2021-03-01 RX ADMIN — CEFAZOLIN SODIUM 2 G: 2 INJECTION, SOLUTION INTRAVENOUS at 13:26

## 2021-03-01 RX ADMIN — LIDOCAINE HYDROCHLORIDE 100 MG: 20 INJECTION, SOLUTION INFILTRATION; PERINEURAL at 13:16

## 2021-03-01 RX ADMIN — LIDOCAINE HYDROCHLORIDE 1 ML: 10 INJECTION, SOLUTION EPIDURAL; INFILTRATION; INTRACAUDAL; PERINEURAL at 12:48

## 2021-03-01 RX ADMIN — FENTANYL CITRATE 50 MCG: 50 INJECTION, SOLUTION INTRAMUSCULAR; INTRAVENOUS at 13:32

## 2021-03-01 RX ADMIN — DEXMEDETOMIDINE HYDROCHLORIDE 10 MCG: 100 INJECTION, SOLUTION INTRAVENOUS at 13:39

## 2021-03-01 RX ADMIN — DEXAMETHASONE SODIUM PHOSPHATE 5 MG: 10 INJECTION, SOLUTION INTRAMUSCULAR; INTRAVENOUS at 13:39

## 2021-03-01 RX ADMIN — SODIUM CHLORIDE, POTASSIUM CHLORIDE, SODIUM LACTATE AND CALCIUM CHLORIDE: 600; 310; 30; 20 INJECTION, SOLUTION INTRAVENOUS at 12:47

## 2021-03-01 RX ADMIN — DEXMEDETOMIDINE HYDROCHLORIDE 4 MCG: 100 INJECTION, SOLUTION INTRAVENOUS at 13:41

## 2021-03-01 RX ADMIN — ONDANSETRON 4 MG: 2 INJECTION INTRAMUSCULAR; INTRAVENOUS at 13:38

## 2021-03-01 ASSESSMENT — LIFESTYLE VARIABLES: TOBACCO_USE: 1

## 2021-03-01 NOTE — OP NOTE
Procedure Date: 03/01/2021      PREOPERATIVE DIAGNOSIS:  Perineal sinus and perineal abscess.      POSTOPERATIVE DIAGNOSIS:  Perineal sinus and perineal abscess, chronic granulation tissue.      PROCEDURE:  Incision and drainage of perineum and unroofing of perineal sinus tract with debridement of subcutaneous tissue.      SURGEON:  Antwan Macias MD, FACS      ANESTHESIA:  General with double endotracheal tube.      INDICATIONS FOR PROCEDURE:  This is a 45-year-old gentleman an episode of Marley's gangrene in 2018.  The wound was eventually closed with a VAC.  For several months after the procedure, he developed a draining sinus in that area and recently noticed some purulent material, so he opted to have it evaluated.  On exam, he had a midline draining sinus that went inferiorly and to the left for about 2 cm.  He wanted to have it healed, so he opted to have it debrided.      OPERATIVE FINDINGS:  Included a 2 cm sinus tract with chronic granulation tissue.      DETAILS OF PROCEDURE:  The patient was taken to the operating room and placed on the table in supine position.  After induction of anesthesia, he was then placed in the lithotomy position and the perineum was prepped and draped in sterile fashion.  A timeout was performed confirming the identity of the patient as well as the procedure to be performed.        The tract was readily seen in the midline and was probed and found to extend inferiorly and to the left for about 2 cm.  The roof of the tract was then opened up using cautery.  Large chronic granulation tissue was identified in the base and this was removed sharply with a #15 blade and submitted as specimen.  Hemostasis was achieved using cautery.  After adequate hemostasis was achieved, the area was infiltrated with a local anesthetic.  The wound was then packed with dry gauze.  Sterile dressings were applied.  The patient was taken from the operating room to the recovery room in stable  condition to be sent home to begin local wound care.         SAMI SANDERS MD, FACS             D: 2021   T: 2021   MT: SAMARA      Name:     JEREMI LUCAS   MRN:      [id number]        Account:        [id number]   :      1975           Procedure Date: 2021      Document: [id number]

## 2021-03-01 NOTE — BRIEF OP NOTE
Brief Operative Note    Pre-operative diagnosis: Perineal sinus [N36.0]  Perineal abscess [L02.215]   Post-operative diagnosis Same, chronic granulation tissue   Procedure: Procedure(s):  INCISION AND DRAINAGE, PERINEUM   Surgeon(s): Surgeon(s) and Role:     * Antwan Macias MD - Primary   Estimated blood loss: 2 mL    Specimens: ID Type Source Tests Collected by Time Destination   A : Chronic granulation tissue perineum Tissue Perineum SURGICAL PATHOLOGY EXAM Antwan Macias MD 3/1/2021  1:37 PM       Findings: 2 cm sinus tract with chronic granulation tissue       #[id number]

## 2021-03-01 NOTE — ANESTHESIA CARE TRANSFER NOTE
Patient: Estevan Callejas    Procedure(s):  INCISION AND DRAINAGE, PERINEUM    Diagnosis: Perineal sinus [N36.0]  Perineal abscess [L02.215]  Diagnosis Additional Information: No value filed.    Anesthesia Type:   MAC     Note:    Oropharynx: oropharynx clear of all foreign objects and spontaneously breathing  Level of Consciousness: drowsy  Oxygen Supplementation: face mask    Independent Airway: airway patency satisfactory and stable  Dentition: dentition unchanged  Vital Signs Stable: post-procedure vital signs reviewed and stable  Report to RN Given: handoff report given  Patient transferred to: PACU    Handoff Report: Identifed the Patient, Identified the Reponsible Provider, Reviewed the pertinent medical history, Discussed the surgical course, Reviewed Intra-OP anesthesia mangement and issues during anesthesia, Set expectations for post-procedure period and Allowed opportunity for questions and acknowledgement of understanding      Vitals: (Last set prior to Anesthesia Care Transfer)  CRNA VITALS  3/1/2021 1317 - 3/1/2021 1352      3/1/2021             Pulse:  71    SpO2:  98 %    Resp Rate (observed):  (!) 5        Electronically Signed By: ANDRES Anton CRNA  March 1, 2021  1:52 PM

## 2021-03-01 NOTE — ANESTHESIA POSTPROCEDURE EVALUATION
Patient: Estevan Callejas    Procedure(s):  INCISION AND DRAINAGE, PERINEUM    Diagnosis:Perineal sinus [N36.0]  Perineal abscess [L02.215]  Diagnosis Additional Information: No value filed.    Anesthesia Type:  MAC    Note:  Disposition: Outpatient   Postop Pain Control: Uneventful            Sign Out: Well controlled pain   PONV: No   Neuro/Psych: Uneventful            Sign Out: Acceptable/Baseline neuro status   Airway/Respiratory: Uneventful            Sign Out: Acceptable/Baseline resp. status   CV/Hemodynamics: Uneventful            Sign Out: Acceptable CV status   Other NRE: NONE   DID A NON-ROUTINE EVENT OCCUR? No    Event details/Postop Comments:  Pt was happy with anesthesia care.  No complications.  I will follow up with the pt if needed.         Last vitals:  Vitals:    03/01/21 1405 03/01/21 1410 03/01/21 1415   BP: 134/80 122/83 (!) 123/98   Pulse: 64 60 59   Resp: 17 11 16   Temp: 96.6  F (35.9  C) 96.6  F (35.9  C) 96.6  F (35.9  C)   SpO2: 100% 100% 100%       Last vitals prior to Anesthesia Care Transfer:  CRNA VITALS  3/1/2021 1317 - 3/1/2021 1417      3/1/2021             Pulse:  71    SpO2:  98 %    Resp Rate (observed):  (!) 5          Electronically Signed By: ANDRES Anton CRNA  March 1, 2021  2:17 PM

## 2021-03-01 NOTE — ANESTHESIA PREPROCEDURE EVALUATION
Anesthesia Pre-Procedure Evaluation    Patient: Estevan Callejas   MRN: [id number] : 1975        Preoperative Diagnosis: Perineal sinus [N36.0]  Perineal abscess [L02.215]   Procedure : Procedure(s):  INCISION AND DRAINAGE, PERINEUM     Past Medical History:   Diagnosis Date     Diabetes (H)       Past Surgical History:   Procedure Laterality Date     EXAM UNDER ANESTHESIA, CHANGE DRESSING (LOCATION), COMBINED N/A 1/15/2018    Procedure: COMBINED EXAM UNDER ANESTHESIA, CHANGE DRESSING (LOCATION);  Dressing Change and exam under Monitored Anesthesia Care with wound vac placement;  Surgeon: Stefania Munroe MD;  Location: [facility] OR     EXAM UNDER ANESTHESIA, CHANGE DRESSING (LOCATION), COMBINED N/A 2018    Procedure: COMBINED EXAM UNDER ANESTHESIA, CHANGE DRESSING (LOCATION);  Serial wound vac change perineal;  Surgeon: Stefania Munroe MD;  Location: [facility] OR     IRRIGATION AND DEBRIDEMENT PERINEAL, COMBINED N/A 2018    Procedure: COMBINED IRRIGATION AND DEBRIDEMENT PERINEAL;  Irrigation and Debridement of perianal abscess;  Surgeon: Stefania Munroe MD;  Location: [facility] OR     IRRIGATION AND DEBRIDEMENT PERINEAL, COMBINED N/A 2018    Procedure: COMBINED IRRIGATION AND DEBRIDEMENT PERINEAL;  combined irrigation and debridement perineal and dressing change;  Surgeon: Stefania Munroe MD;  Location: [facility] OR     ORTHOPEDIC SURGERY       TONSILLECTOMY, ADENOIDECTOMY, COMBINED        Allergies   Allergen Reactions     Seasonal Allergies      Sneezing, itchy eyes      Social History     Tobacco Use     Smoking status: Current Some Day Smoker     Types: Dip, chew, snus or snuff, Cigarettes     Smokeless tobacco: Current User   Substance Use Topics     Alcohol use: Yes      Wt Readings from Last 1 Encounters:   21 108.9 kg (240 lb)        Anesthesia Evaluation   Pt has had prior anesthetic. Type: General and MAC.    No history of anesthetic complications        ROS/MED HX  ENT/Pulmonary:     (+) TRINITY risk factors, snores loudly, hypertension, obese, tobacco use, Current use,     Neurologic:  - neg neurologic ROS     Cardiovascular:     (+) Dyslipidemia hypertension-----    METS/Exercise Tolerance: >4 METS    Hematologic:  - neg hematologic  ROS     Musculoskeletal:  - neg musculoskeletal ROS     GI/Hepatic:  - neg GI/hepatic ROS     Renal/Genitourinary: Comment: Perineal abscess - neg Renal ROS     Endo:     (+) type II DM, Last HgA1c: 5.6, date: 2/26/21, Obesity,     Psychiatric/Substance Use:  - neg psychiatric ROS     Infectious Disease:  - neg infectious disease ROS     Malignancy:  - neg malignancy ROS     Other:  - neg other ROS          Physical Exam    Airway  airway exam normal      Mallampati: II   TM distance: > 3 FB   Neck ROM: full   Mouth opening: > 3 cm    Respiratory Devices and Support         Dental  no notable dental history         Cardiovascular   cardiovascular exam normal       Rhythm and rate: regular and normal     Pulmonary   pulmonary exam normal        breath sounds clear to auscultation           OUTSIDE LABS:  CBC:   Lab Results   Component Value Date    WBC 10.2 02/26/2021    WBC 13.0 (H) 11/14/2019    HGB 14.8 02/26/2021    HGB 15.2 11/14/2019    HCT 44.8 02/26/2021    HCT 45.5 11/14/2019     02/26/2021     11/14/2019     BMP:   Lab Results   Component Value Date     02/26/2021     07/08/2020    POTASSIUM 4.7 02/26/2021    POTASSIUM 4.0 07/08/2020    CHLORIDE 102 02/26/2021    CHLORIDE 107 07/08/2020    CO2 28 02/26/2021    CO2 24 07/08/2020    BUN 12 02/26/2021    BUN 8 07/08/2020    CR 0.89 02/26/2021    CR 0.68 07/08/2020    GLC 83 02/26/2021     (H) 07/08/2020     COAGS:   Lab Results   Component Value Date    INR 1.26 (H) 01/13/2018     POC:   Lab Results   Component Value Date     (H) 11/05/2018     HEPATIC:   Lab Results   Component Value Date    ALBUMIN 4.2 07/08/2020    PROTTOTAL 8.1  07/08/2020    ALT 47 07/08/2020    AST 31 07/08/2020    ALKPHOS 109 07/08/2020    BILITOTAL 0.5 07/08/2020     OTHER:   Lab Results   Component Value Date    LACT 1.7 01/13/2018    A1C 5.6 02/26/2021    THEO 9.1 02/26/2021    TSH 0.92 03/22/2019    .0 (H) 01/13/2018    SED 8 02/18/2015       Anesthesia Plan    ASA Status:  3   NPO Status:  NPO Appropriate    Anesthesia Type: General.     - Airway: LMA   Induction: Intravenous.   Maintenance: Balanced.        Consents    Anesthesia Plan(s) and associated risks, benefits, and realistic alternatives discussed. Questions answered and patient/representative(s) expressed understanding.     - Discussed with:  Patient      - Extended Intubation/Ventilatory Support Discussed: No.      - Patient is DNR/DNI Status: No    Use of blood products discussed: Yes.     - Discussed with: Patient.     - Consented: consented to blood products     Postoperative Care    Pain management: Multi-modal analgesia, IV analgesics, Oral pain medications.   PONV prophylaxis: Ondansetron (or other 5HT-3), Dexamethasone or Solumedrol     Comments:                ANDRES Anton CRNA

## 2021-03-01 NOTE — ANESTHESIA PROCEDURE NOTES
Airway    Patient location during procedure: OR  Staff -   CRNA: Amanda Mendoza APRN CRNA  Other Anesthesia Staff: Alba Kelley  Performed By: CRNA and SRNA    Consent for Airway   Urgency: elective    Indications and Patient Condition  Indications for airway management: lola-procedural  Induction type:intravenousMask difficulty assessment: 1 - vent by mask    Final Airway Details  Final airway type: supraglottic airway    Endotracheal Airway Details   Secured with: plastic tape    Post intubation assessment   Placement verified by: capnometry, equal breath sounds and chest rise   Number of attempts at approach: 1  Number of other approaches attempted: 0  Secured with:plastic tape  Ease of procedure: easy  Dentition: Intact and UnchangedAdditional Comments  Atraumatic, no apparent complications

## 2021-03-04 LAB — COPATH REPORT: NORMAL

## 2021-03-11 ENCOUNTER — OFFICE VISIT (OUTPATIENT)
Dept: SURGERY | Facility: CLINIC | Age: 46
End: 2021-03-11
Payer: COMMERCIAL

## 2021-03-11 VITALS
SYSTOLIC BLOOD PRESSURE: 134 MMHG | TEMPERATURE: 96.9 F | HEIGHT: 70 IN | BODY MASS INDEX: 33.26 KG/M2 | DIASTOLIC BLOOD PRESSURE: 62 MMHG | WEIGHT: 232.3 LBS

## 2021-03-11 DIAGNOSIS — Z98.890 POST-OPERATIVE STATE: Primary | ICD-10-CM

## 2021-03-11 PROCEDURE — 99024 POSTOP FOLLOW-UP VISIT: CPT | Performed by: SPECIALIST

## 2021-03-11 ASSESSMENT — PAIN SCALES - GENERAL: PAINLEVEL: MODERATE PAIN (5)

## 2021-03-11 ASSESSMENT — MIFFLIN-ST. JEOR: SCORE: 1948.69

## 2021-03-11 NOTE — LETTER
[address] [address]  Phone: [phone]    March 11, 2021        Estevan Callejas  [address] [facility] [address]          To whom it may concern:    RE: Estevan Callejas    Patient was seen and treated today at our clinic.  Patient will be out of work until his next post op appointment in 2 weeks, at that time his return to work and work restrictions (if any) will be reevaluated.    Please contact me for questions or concerns.      Sincerely,        Antwan Macias MD, FACS

## 2021-03-11 NOTE — PROGRESS NOTES
Follow-up for incision and drainage of perineal sinus tract    Subjective:  Patient feels good.  Still some pain with sitting so he would like to delay his return to work for 2 weeks.      Objective:  B/P: 134/62, T: 96.9, P: Data Unavailable, R: Data Unavailable  : Wound healing well.  100% granulation tissue.      Path:  SPECIMEN(S):   Tissue, perineum     FINAL DIAGNOSIS:   Perineal soft tissue, biopsy:   - Benign fibrous tissue with moderate acute and chronic inflammation with   neutrophils, lymphocytes and plasma   cells.   - Negative for malignancy.     Electronically signed out by:     Morelia Molina M.D.     Assessment/plan:  Patient is status post unroofing of a perineal sinus tract.  Doing well.  Still has some pain with sitting would like to delay his return to work.  The plan at this time is to keep him off work for another 2 weeks until he can be reevaluated.  We will continue to pack the area twice daily with moist gauze.  He knows to be seen sooner should problems develop.    Antwan Macias MD, FACS   LMP---2010

## 2021-03-11 NOTE — LETTER
3/11/2021         RE: Estevan Callejas  [address]  [facility] [address]        Dear Colleague,    Thank you for referring your patient, Estevan Callejas, to the [facility]. Please see a copy of my visit note below.    Follow-up for incision and drainage of perineal sinus tract    Subjective:  Patient feels good.  Still some pain with sitting so he would like to delay his return to work for 2 weeks.      Objective:  B/P: 134/62, T: 96.9, P: Data Unavailable, R: Data Unavailable  : Wound healing well.  100% granulation tissue.      Path:  SPECIMEN(S):   Tissue, perineum     FINAL DIAGNOSIS:   Perineal soft tissue, biopsy:   - Benign fibrous tissue with moderate acute and chronic inflammation with   neutrophils, lymphocytes and plasma   cells.   - Negative for malignancy.     Electronically signed out by:     Morelia Molina M.D.     Assessment/plan:  Patient is status post unroofing of a perineal sinus tract.  Doing well.  Still has some pain with sitting would like to delay his return to work.  The plan at this time is to keep him off work for another 2 weeks until he can be reevaluated.  We will continue to pack the area twice daily with moist gauze.  He knows to be seen sooner should problems develop.    Antwan Macias MD, FACS      Again, thank you for allowing me to participate in the care of your patient.        Sincerely,        Antwan Macias MD

## 2021-03-12 ENCOUNTER — TELEPHONE (OUTPATIENT)
Dept: SURGERY | Facility: CLINIC | Age: 46
End: 2021-03-12

## 2021-03-12 NOTE — TELEPHONE ENCOUNTER
Received forms. These were filled out and are pending signature from Dr. Macias.     To fax to [phone] when completed.     Chaparrita RUSS, Lead RN, BSN. . .  3/12/2021, 1:22 PM

## 2021-03-12 NOTE — TELEPHONE ENCOUNTER
Copy sent to scanning. Copy placed in upper level nurses station faxed forms drawer.     Chaparrita RUSS, Lead RN, BSN. . .  3/12/2021, 4:32 PM

## 2021-03-25 ENCOUNTER — TELEPHONE (OUTPATIENT)
Dept: SURGERY | Facility: CLINIC | Age: 46
End: 2021-03-25

## 2021-03-25 ENCOUNTER — OFFICE VISIT (OUTPATIENT)
Dept: SURGERY | Facility: CLINIC | Age: 46
End: 2021-03-25
Payer: COMMERCIAL

## 2021-03-25 VITALS
SYSTOLIC BLOOD PRESSURE: 130 MMHG | TEMPERATURE: 96.5 F | BODY MASS INDEX: 34.03 KG/M2 | DIASTOLIC BLOOD PRESSURE: 68 MMHG | WEIGHT: 236.6 LBS

## 2021-03-25 DIAGNOSIS — Z98.890 POST-OPERATIVE STATE: Primary | ICD-10-CM

## 2021-03-25 PROCEDURE — 99024 POSTOP FOLLOW-UP VISIT: CPT | Performed by: SPECIALIST

## 2021-03-25 NOTE — LETTER
3/25/2021         RE: Estevan Callejas  [address] [facility] [address]        Dear Colleague,    Thank you for referring your patient, Estevan Callejas, to the [facility]. Please see a copy of my visit note below.    Follow-up for incision and drainage of perineal sinus tract    Subjective:  Patient feels good.  Pain is resolved.  He would like go back to work Monday.    Objective:  B/P: 130/68, T: 96.5, P: Data Unavailable, R: Data Unavailable  : Wound healing well.  100% granulation tissue.  Wound almost closed.      Path:  SPECIMEN(S):   Tissue, perineum     FINAL DIAGNOSIS:   Perineal soft tissue, biopsy:   - Benign fibrous tissue with moderate acute and chronic inflammation with   neutrophils, lymphocytes and plasma   cells.   - Negative for malignancy.     Electronically signed out by:     Morelia Molina M.D.     Assessment/plan:  Patient is status post unroofing of a perineal sinus tract.  Doing well.  Pain is resolved.  He would like to back to work.  The wound is almost completely closed.  The plan at this time is to have him back to work Monday as he wishes.  He will keep the area covered with gauze until it heals.  He will follow-up in 2 weeks should the wound remain open.      Anwtan Macias MD, FACS      Again, thank you for allowing me to participate in the care of your patient.        Sincerely,        Antwan Macias MD

## 2021-03-25 NOTE — TELEPHONE ENCOUNTER
Employee return to work form completed and signed by provider, copy given to patient , copy faxed to number provided [phone], copy sent scanning, copy placed in RN drawer.............

## 2021-03-25 NOTE — PROGRESS NOTES
Follow-up for incision and drainage of perineal sinus tract    Subjective:  Patient feels good.  Pain is resolved.  He would like go back to work Monday.    Objective:  B/P: 130/68, T: 96.5, P: Data Unavailable, R: Data Unavailable  : Wound healing well.  100% granulation tissue.  Wound almost closed.      Path:  SPECIMEN(S):   Tissue, perineum     FINAL DIAGNOSIS:   Perineal soft tissue, biopsy:   - Benign fibrous tissue with moderate acute and chronic inflammation with   neutrophils, lymphocytes and plasma   cells.   - Negative for malignancy.     Electronically signed out by:     Morelia Molina M.D.     Assessment/plan:  Patient is status post unroofing of a perineal sinus tract.  Doing well.  Pain is resolved.  He would like to back to work.  The wound is almost completely closed.  The plan at this time is to have him back to work Monday as he wishes.  He will keep the area covered with gauze until it heals.  He will follow-up in 2 weeks should the wound remain open.      Antwan Macias MD, FACS

## 2021-03-26 NOTE — TELEPHONE ENCOUNTER
This was approved by [facility] sent to scan    Yes - the patient is able to be screened

## 2021-04-15 DIAGNOSIS — E11.9 TYPE 2 DIABETES MELLITUS WITHOUT COMPLICATION, WITHOUT LONG-TERM CURRENT USE OF INSULIN (H): ICD-10-CM

## 2021-04-15 RX ORDER — METFORMIN HCL 500 MG
TABLET, EXTENDED RELEASE 24 HR ORAL
Qty: 180 TABLET | Refills: 0 | Status: SHIPPED | OUTPATIENT
Start: 2021-04-15 | End: 2021-10-21

## 2021-04-15 NOTE — TELEPHONE ENCOUNTER
Prescription approved per [facility] Refill Protocol.  Humera Suarez RN, BSN  [place]  April 15, 2021

## 2021-05-06 ENCOUNTER — OFFICE VISIT (OUTPATIENT)
Dept: SURGERY | Facility: CLINIC | Age: 46
End: 2021-05-06
Payer: COMMERCIAL

## 2021-05-06 VITALS
SYSTOLIC BLOOD PRESSURE: 142 MMHG | DIASTOLIC BLOOD PRESSURE: 78 MMHG | BODY MASS INDEX: 33.34 KG/M2 | TEMPERATURE: 97.2 F | WEIGHT: 231.8 LBS

## 2021-05-06 DIAGNOSIS — Z98.890 POST-OPERATIVE STATE: Primary | ICD-10-CM

## 2021-05-06 DIAGNOSIS — L92.9 HYPERGRANULATION: ICD-10-CM

## 2021-05-06 PROCEDURE — 17250 CHEM CAUT OF GRANLTJ TISSUE: CPT | Performed by: SPECIALIST

## 2021-05-06 NOTE — LETTER
5/6/2021         RE: Estevan Callejas  [address]  [facility] [address]        Dear Colleague,    Thank you for referring your patient, Estevan Callejas, to the [facility]. Please see a copy of my visit note below.    Follow-up for incision and drainage of perineal sinus tract    Subjective:  Patient feels good.  Pain is resolved.  He thought the wound was fully healed and then last week he noticed some drainage in the area became concerned about it.  He now presents for follow-up.        Objective:  B/P: 142/78, T: 97.2, P: Data Unavailable, R: Data Unavailable  : Wound 95% closed.  There was an area of hyper granulation in the center.  A gentle force applied to it and discovered a tract going about a centimeter deep.  No obvious signs of infection.    Path:  SPECIMEN(S):   Tissue, perineum     FINAL DIAGNOSIS:   Perineal soft tissue, biopsy:   - Benign fibrous tissue with moderate acute and chronic inflammation with   neutrophils, lymphocytes and plasma   cells.   - Negative for malignancy.     Electronically signed out by:     Morelia Molina M.D.     Assessment/plan:  Patient is status post unroofing of a perineal sinus tract.  Doing well.  Pain is resolved.  He does appear to have another tract with hyper granulation.  Silver nitrate was applied to this.  He will follow-up with me in 1 week for wound check.  Hopefully with silver nitrate application the wound to close.  He also understands he may require another unroofing.      Prashanth to follow up with Primary Care provider regarding elevated blood pressure.      Antwan Macias MD, FACS      Again, thank you for allowing me to participate in the care of your patient.        Sincerely,        Antwan Macias MD

## 2021-05-06 NOTE — PROGRESS NOTES
Follow-up for incision and drainage of perineal sinus tract    Subjective:  Patient feels good.  Pain is resolved.  He thought the wound was fully healed and then last week he noticed some drainage in the area became concerned about it.  He now presents for follow-up.        Objective:  B/P: 142/78, T: 97.2, P: Data Unavailable, R: Data Unavailable  : Wound 95% closed.  There was an area of hyper granulation in the center.  A gentle force applied to it and discovered a tract going about a centimeter deep.  No obvious signs of infection.    Path:  SPECIMEN(S):   Tissue, perineum     FINAL DIAGNOSIS:   Perineal soft tissue, biopsy:   - Benign fibrous tissue with moderate acute and chronic inflammation with   neutrophils, lymphocytes and plasma   cells.   - Negative for malignancy.     Electronically signed out by:     Morelia Molina M.D.     Assessment/plan:  Patient is status post unroofing of a perineal sinus tract.  Doing well.  Pain is resolved.  He does appear to have another tract with hyper granulation.  Silver nitrate was applied to this.  He will follow-up with me in 1 week for wound check.  Hopefully with silver nitrate application the wound to close.  He also understands he may require another unroofing.      Prashanth to follow up with Primary Care provider regarding elevated blood pressure.      Antwan Macias MD, FACS

## 2021-05-13 ENCOUNTER — OFFICE VISIT (OUTPATIENT)
Dept: SURGERY | Facility: CLINIC | Age: 46
End: 2021-05-13
Payer: COMMERCIAL

## 2021-05-13 VITALS
WEIGHT: 233.9 LBS | SYSTOLIC BLOOD PRESSURE: 130 MMHG | DIASTOLIC BLOOD PRESSURE: 70 MMHG | BODY MASS INDEX: 33.64 KG/M2 | TEMPERATURE: 97.6 F

## 2021-05-13 DIAGNOSIS — Z98.890 POST-OPERATIVE STATE: Primary | ICD-10-CM

## 2021-05-13 DIAGNOSIS — L92.9 HYPERGRANULATION: ICD-10-CM

## 2021-05-13 PROCEDURE — 99024 POSTOP FOLLOW-UP VISIT: CPT | Performed by: SPECIALIST

## 2021-05-13 NOTE — LETTER
[address] [address]  Phone: [phone]    May 13, 2021        Estevan Callejas  [address] [facility] [address]          To whom it may concern:    RE: Estevan Callejas    Patient was seen and treated today at our clinic.    Please contact me for questions or concerns.      Sincerely,        Antwan Macias MD, FACS

## 2021-05-13 NOTE — PROGRESS NOTES
Follow-up for incision and drainage of perineal sinus tract    Subjective:  Patient feels good.  Pain is resolved.  He thought the wound was fully healed and then last week he noticed some drainage in the area became concerned about it.      His wounds open last week and treated with silver nitrate. He thinks the wound is closed on today's visit.      Objective:  B/P: 130/70, T: 97.6, P: Data Unavailable, R: Data Unavailable  : Wound closed.      Path:  SPECIMEN(S):   Tissue, perineum     FINAL DIAGNOSIS:   Perineal soft tissue, biopsy:   - Benign fibrous tissue with moderate acute and chronic inflammation with   neutrophils, lymphocytes and plasma   cells.   - Negative for malignancy.     Electronically signed out by:     Morelia Molina M.D.     Assessment/plan:  Patient is status post unroofing of a perineal sinus tract.  Doing well. The hyper granular tract closed with application of silver nitrate. He will follow me as necessary.    Antwan Macias MD, FACS

## 2021-05-22 DIAGNOSIS — E11.8 TYPE 2 DIABETES MELLITUS WITH COMPLICATION, WITHOUT LONG-TERM CURRENT USE OF INSULIN (H): ICD-10-CM

## 2021-05-25 RX ORDER — BLOOD SUGAR DIAGNOSTIC
STRIP MISCELLANEOUS
Qty: 100 STRIP | Refills: 3 | Status: SHIPPED | OUTPATIENT
Start: 2021-05-25

## 2021-05-25 NOTE — TELEPHONE ENCOUNTER
Prescription approved per [facility] Refill Protocol.    Deysi Holm RN on 5/25/2021 at 3:39 PM

## 2021-05-26 NOTE — PROGRESS NOTES
"5 x1.5 cm x1 cm with 2 cm pocket   Patient comes to wound clinic for wound assessment per request of Dr. Munroe. 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 Previous treatment includes: wound vac MW  This is treatment week: 4    Objective findings: improving wound ,      Location: midline posterior scrotum and perineum     Wound measured.: 4.0 x 1.5 x 1.0 cm, Full thickness.    pocket in the lower posterior wall <1 cm around and 2 cm deep.     Patient states that area of sensitivity in this area now gone    Tenderness: sometimes    Odor: none    Drainage is small amt serosanguinous    Wound Base: moist and granulation     Palpation of the wound bed: normal    Periwound Skin: intact      Color: normal and consistent with surrounding tissue     Subjective finding:   Patient is assessed for discomfort which is: none    Today's status of the wound:  improving    Treatment:  Wound vac was discontinued on Monday. Dressing changes are going well but pocket wasn't packed deep enough with too short of strip. Re-educated the pt. Will use a strip of HydroferaBlue dressing instead. Wound washed with [employer], irrigated with saline, pocket packet with 1/4\" HydroferaBlue dressing , moist gauze on the remainder of the wound, covered with 2 dry gauzes and tape. Pt will do BID dressing changes,      PLAN: Dressing changes 1 times/week in clinic.      Pt received the following instructions: Wound washed with [employer], irrigated with saline, pocket packet with 1/4\" strip HydroferaBlue dressing , moist gauze on the remainder of the wound, covered with 2 dry gauzes and tape. Pt will do BID dressing changes,Signs and symptoms of infection taught. Patient needs to be seen 1 week. Treated and follow-up appointment made.   Almas Martinez was available for supervision of care if needed or if " questions should arise regarding plan of care.  Patrica Johnson  RN CWON   yes

## 2021-06-04 DIAGNOSIS — E11.69 TYPE 2 DIABETES MELLITUS WITH OTHER SPECIFIED COMPLICATION, WITHOUT LONG-TERM CURRENT USE OF INSULIN (H): ICD-10-CM

## 2021-06-04 RX ORDER — GLIPIZIDE 5 MG/1
TABLET, FILM COATED, EXTENDED RELEASE ORAL
Qty: 90 TABLET | Refills: 0 | Status: SHIPPED | OUTPATIENT
Start: 2021-06-04 | End: 2021-09-08

## 2021-06-04 NOTE — TELEPHONE ENCOUNTER
Pending Prescriptions:                       Disp   Refills    glipiZIDE (GLUCOTROL XL) 5 MG 24 hr table*90 tab*0            Sig: TAKE ONE TABLET BY MOUTH once daily    Medication is being filled for 1 time saurav refill only due to:  Patient is due for diabetes follow up    Please call and help schedule.  Thank you!    Deysi Holm RN on 6/4/2021 at 11:47 AM

## 2021-07-01 ENCOUNTER — TELEPHONE (OUTPATIENT)
Dept: FAMILY MEDICINE | Facility: OTHER | Age: 46
End: 2021-07-01

## 2021-07-01 NOTE — TELEPHONE ENCOUNTER
Patient Quality Outreach Summary      Summary:    Patient is due/failing the following:   Diabetic Follow-Up Visit    Type of outreach:    Phone, spoke to patient/parent. patient no longer has insurance.  I informed patient to check out the mnsure due to life change and losing his job.  Questions for provider review:    None                                                                                                                    Marcela [facility]       Chart routed to Care Team.

## 2021-07-07 DIAGNOSIS — E11.8 TYPE 2 DIABETES MELLITUS WITH COMPLICATION, WITHOUT LONG-TERM CURRENT USE OF INSULIN (H): ICD-10-CM

## 2021-07-07 RX ORDER — ATORVASTATIN CALCIUM 20 MG/1
TABLET, FILM COATED ORAL
Qty: 90 TABLET | Refills: 0 | Status: SHIPPED | OUTPATIENT
Start: 2021-07-07 | End: 2022-03-22

## 2021-07-07 NOTE — TELEPHONE ENCOUNTER
Pending Prescriptions:                       Disp   Refills    atorvastatin (LIPITOR) 20 MG tablet [Phar*90 tab*0            Sig: TAKE ONE TABLET BY MOUTH once daily    Medication is being filled for 1 time saurav refill only due to:  Patient is due for fasting labs    Please call and help schedule.  Thank you!    Deysi Holm RN on 7/7/2021 at 8:01 AM

## 2021-09-12 DIAGNOSIS — E11.9 TYPE 2 DIABETES MELLITUS WITHOUT COMPLICATION, WITHOUT LONG-TERM CURRENT USE OF INSULIN (H): ICD-10-CM

## 2021-09-13 NOTE — TELEPHONE ENCOUNTER
Pending Prescriptions:                       Disp   Refills    metFORMIN (GLUCOPHAGE-XR) 500 MG 24 hr tab*180 ta*0        Sig: TAKE TWO TABLETS (1,000 mg) BY MOUTH once daily with           dinner      Routing refill request to provider for review/approval because:  Keshia given x1 and patient did not follow up, please advise    Deysi Holm RN on 9/13/2021 at 2:23 PM

## 2021-09-13 NOTE — TELEPHONE ENCOUNTER
Schedule appt before further refills. Ok for a virtual visit if he can complete labs prior to the visit

## 2021-09-14 NOTE — TELEPHONE ENCOUNTER
Left vm advising pt he needed to cristela an appt in order to receive refills. Provided clinic number for follow up

## 2021-09-17 RX ORDER — METFORMIN HCL 500 MG
TABLET, EXTENDED RELEASE 24 HR ORAL
Qty: 180 TABLET | Refills: 0 | OUTPATIENT
Start: 2021-09-17

## 2021-10-21 DIAGNOSIS — E11.9 TYPE 2 DIABETES MELLITUS WITHOUT COMPLICATION, WITHOUT LONG-TERM CURRENT USE OF INSULIN (H): ICD-10-CM

## 2021-10-21 RX ORDER — METFORMIN HCL 500 MG
TABLET, EXTENDED RELEASE 24 HR ORAL
Qty: 60 TABLET | Refills: 0 | Status: SHIPPED | OUTPATIENT
Start: 2021-10-21 | End: 2022-02-22

## 2021-10-21 RX ORDER — LISINOPRIL 20 MG/1
TABLET ORAL
Qty: 90 TABLET | Refills: 1 | Status: SHIPPED | OUTPATIENT
Start: 2021-10-21 | End: 2022-03-22

## 2021-10-21 NOTE — TELEPHONE ENCOUNTER
Pending Prescriptions:                       Disp   Refills    metFORMIN (GLUCOPHAGE-XR) 500 MG 24 hr tab*180 ta*0        Sig: TAKE TWO TABLETS (1,000 mg) BY MOUTH once daily with           dinner    Routing refill request to provider for review/approval because:  Labs not current:  A1C  Lab Results   Component Value Date    A1C 5.6 02/26/2021    A1C 5.3 10/16/2020    A1C 6.0 07/08/2020    A1C 5.9 07/19/2019    A1C 6.4 03/22/2019     A break in medication  Patient needs to be seen because:  due for 6 month f/u

## 2022-02-18 DIAGNOSIS — E11.9 TYPE 2 DIABETES MELLITUS WITHOUT COMPLICATION, WITHOUT LONG-TERM CURRENT USE OF INSULIN (H): ICD-10-CM

## 2022-02-21 NOTE — TELEPHONE ENCOUNTER
Pending Prescriptions:                       Disp   Refills    metFORMIN (GLUCOPHAGE-XR) 500 MG 24 hr tab*60 tab*0        Sig: TAKE TWO TABLETS (1,000 mg) BY MOUTH once daily with           dinner    Routing refill request to provider for review/approval because:  Labs not current:    Hemoglobin A1C POCT   Date Value Ref Range Status   02/26/2021 5.6 0 - 5.6 % Final     Comment:     Normal <5.7% Prediabetes 5.7-6.4%  Diabetes 6.5% or higher - adopted from ADA   consensus guidelines.        over 7 months, RN unable to provide a saurav refill.

## 2022-02-22 RX ORDER — METFORMIN HCL 500 MG
TABLET, EXTENDED RELEASE 24 HR ORAL
Qty: 60 TABLET | Refills: 0 | Status: SHIPPED | OUTPATIENT
Start: 2022-02-22 | End: 2022-03-22

## 2022-02-22 NOTE — TELEPHONE ENCOUNTER
Patient is overdue for follow-up on diabetes.  Please schedule appointment.  Short prescription sent in the interim

## 2022-03-22 ENCOUNTER — OFFICE VISIT (OUTPATIENT)
Dept: FAMILY MEDICINE | Facility: OTHER | Age: 47
End: 2022-03-22
Payer: COMMERCIAL

## 2022-03-22 VITALS
BODY MASS INDEX: 34.07 KG/M2 | HEIGHT: 70 IN | DIASTOLIC BLOOD PRESSURE: 80 MMHG | HEART RATE: 68 BPM | RESPIRATION RATE: 18 BRPM | OXYGEN SATURATION: 99 % | WEIGHT: 238 LBS | SYSTOLIC BLOOD PRESSURE: 132 MMHG | TEMPERATURE: 98.5 F

## 2022-03-22 DIAGNOSIS — E11.69 TYPE 2 DIABETES MELLITUS WITH OTHER SPECIFIED COMPLICATION, WITHOUT LONG-TERM CURRENT USE OF INSULIN (H): ICD-10-CM

## 2022-03-22 DIAGNOSIS — E11.8 TYPE 2 DIABETES MELLITUS WITH COMPLICATION, WITHOUT LONG-TERM CURRENT USE OF INSULIN (H): ICD-10-CM

## 2022-03-22 DIAGNOSIS — M79.2 NEUROPATHIC PAIN: Primary | ICD-10-CM

## 2022-03-22 DIAGNOSIS — E11.9 TYPE 2 DIABETES MELLITUS WITHOUT COMPLICATION, WITHOUT LONG-TERM CURRENT USE OF INSULIN (H): ICD-10-CM

## 2022-03-22 PROCEDURE — 99213 OFFICE O/P EST LOW 20 MIN: CPT | Performed by: FAMILY MEDICINE

## 2022-03-22 PROCEDURE — 99207 PR FOOT EXAM NO CHARGE: CPT | Mod: 25 | Performed by: FAMILY MEDICINE

## 2022-03-22 RX ORDER — ATORVASTATIN CALCIUM 20 MG/1
20 TABLET, FILM COATED ORAL DAILY
Qty: 90 TABLET | Refills: 1 | Status: SHIPPED | OUTPATIENT
Start: 2022-03-22 | End: 2022-10-12

## 2022-03-22 RX ORDER — LISINOPRIL 20 MG/1
20 TABLET ORAL DAILY
Qty: 90 TABLET | Refills: 1 | Status: SHIPPED | OUTPATIENT
Start: 2022-03-22 | End: 2022-10-12

## 2022-03-22 RX ORDER — METFORMIN HCL 500 MG
TABLET, EXTENDED RELEASE 24 HR ORAL
Qty: 180 TABLET | Refills: 1 | Status: SHIPPED | OUTPATIENT
Start: 2022-03-22 | End: 2022-10-12

## 2022-03-22 RX ORDER — GABAPENTIN 300 MG/1
300 CAPSULE ORAL 3 TIMES DAILY
Qty: 270 CAPSULE | Refills: 1 | Status: SHIPPED | OUTPATIENT
Start: 2022-03-22 | End: 2022-10-12

## 2022-03-22 RX ORDER — GLIPIZIDE 5 MG/1
5 TABLET, FILM COATED, EXTENDED RELEASE ORAL DAILY
Qty: 90 TABLET | Refills: 1 | Status: SHIPPED | OUTPATIENT
Start: 2022-03-22 | End: 2022-10-12

## 2022-03-22 ASSESSMENT — PAIN SCALES - GENERAL: PAINLEVEL: NO PAIN (0)

## 2022-03-22 NOTE — PROGRESS NOTES
"  Assessment & Plan   Problem List Items Addressed This Visit     Type 2 diabetes mellitus with complication, without long-term current use of insulin (H)     Not checking sugars  Foot exam normal  Due for eye exam which he will schedule when he has insurance sorted   Update labs. Refill meds  Consider adjusting dose based on results  Recheck in 3- 6 months         Relevant Medications    atorvastatin (LIPITOR) 20 MG tablet    glipiZIDE (GLUCOTROL XL) 5 MG 24 hr tablet    metFORMIN (GLUCOPHAGE-XR) 500 MG 24 hr tablet    lisinopril (ZESTRIL) 20 MG tablet    Other Relevant Orders    FOOT EXAM (Completed)      Other Visit Diagnoses     Neuropathic pain    -  Primary    Relevant Medications    gabapentin (NEURONTIN) 300 MG capsule    Type 2 diabetes mellitus with other specified complication, without long-term current use of insulin (H)        Relevant Medications    glipiZIDE (GLUCOTROL XL) 5 MG 24 hr tablet    metFORMIN (GLUCOPHAGE-XR) 500 MG 24 hr tablet    Type 2 diabetes mellitus without complication, without long-term current use of insulin (H)        Relevant Medications    glipiZIDE (GLUCOTROL XL) 5 MG 24 hr tablet    metFORMIN (GLUCOPHAGE-XR) 500 MG 24 hr tablet    lisinopril (ZESTRIL) 20 MG tablet    Other Relevant Orders    Albumin Random Urine Quantitative with Creat Ratio    HEMOGLOBIN A1C    BASIC METABOLIC PANEL    LDL cholesterol direct           Tobacco Cessation:   reports that he has been smoking dip, chew, snus or snuff and cigarettes. He uses smokeless tobacco.  Tobacco Cessation Action Plan: Information offered: Patient not interested at this time    BMI:   Estimated body mass index is 34.64 kg/m  as calculated from the following:    Height as of this encounter: 1.765 m (5' 9.5\").    Weight as of this encounter: 108 kg (238 lb).   Weight management plan: Discussed healthy diet and exercise guidelines    See Patient Instructions    Return in about 6 months (around 9/22/2022).    Lawanda Barrera, " "MD BENITEZ [facility] SAM Alford is a 46 year old who presents for the following health issues     History of Present Illness       Diabetes:   He presents for follow up of diabetes.  He is checking home blood glucose a few times a month. He checks blood glucose before and after meals.  Blood glucose is never over 200 and never under 70. When his blood glucose is low, the patient is asymptomatic for confusion, blurred vision, lethargy and reports not feeling dizzy, shaky, or weak.  He is concerned about none and other.  He is having blurry vision. The patient has not had a diabetic eye exam in the last 12 months.         He eats 4 or more servings of fruits and vegetables daily.He consumes 1 sweetened beverage(s) daily.He exercises with enough effort to increase his heart rate 30 to 60 minutes per day.  He exercises with enough effort to increase his heart rate 4 days per week. He is missing 1 dose(s) of medications per week.  He is not taking prescribed medications regularly due to remembering to take.           Review of Systems   Constitutional, HEENT, cardiovascular, pulmonary, gi and gu systems are negative, except as otherwise noted.      Objective    /80   Pulse 68   Temp 98.5  F (36.9  C) (Temporal)   Resp 18   Ht 1.765 m (5' 9.5\")   Wt 108 kg (238 lb)   SpO2 99%   BMI 34.64 kg/m    Body mass index is 34.64 kg/m .  Physical Exam   GENERAL: healthy, alert and no distress  NECK: no adenopathy, no asymmetry, masses, or scars and thyroid normal to palpation  RESP: lungs clear to auscultation - no rales, rhonchi or wheezes  CV: regular rate and rhythm, normal S1 S2, no S3 or S4, no murmur, click or rub, no peripheral edema and peripheral pulses strong  ABDOMEN: soft, nontender, no hepatosplenomegaly, no masses and bowel sounds normal  Diabetic foot exam: normal DP and PT pulses, no trophic changes or ulcerative lesions and normal sensory exam          "

## 2022-03-22 NOTE — ASSESSMENT & PLAN NOTE
Not checking sugars  Foot exam normal  Due for eye exam which he will schedule when he has insurance sorted   Update labs. Refill meds  Consider adjusting dose based on results  Recheck in 3- 6 months

## 2022-05-03 ENCOUNTER — TELEPHONE (OUTPATIENT)
Dept: FAMILY MEDICINE | Facility: OTHER | Age: 47
End: 2022-05-03
Payer: COMMERCIAL

## 2022-05-03 NOTE — TELEPHONE ENCOUNTER
Left message for patient to call clinic. Please see message below and assist patient when call is returned.

## 2022-07-18 DIAGNOSIS — E11.8 TYPE 2 DIABETES MELLITUS WITH COMPLICATION, WITHOUT LONG-TERM CURRENT USE OF INSULIN (H): ICD-10-CM

## 2022-07-18 NOTE — LETTER
August 16, 2022      Estevan Callejas  [address] [facility] [address] [facility]  [place] [address]                Dear Estevan,    We have been trying to get in touch with you via phone but have been unsusseful with connecting. Dr. Barrera is asking for labs to be completed that she advised you to have completed back from March. You are also due for your yearly preventative appointment. Please give us a call to get this scheduled so we can continue refilling your medications and supplies at [phone].      Sincerely,      Lawanda Barrera MD and Staff

## 2022-08-12 RX ORDER — BLOOD SUGAR DIAGNOSTIC
STRIP MISCELLANEOUS
Qty: 100 STRIP | Refills: 3 | OUTPATIENT
Start: 2022-08-12

## 2022-10-06 DIAGNOSIS — E11.8 TYPE 2 DIABETES MELLITUS WITH COMPLICATION, WITHOUT LONG-TERM CURRENT USE OF INSULIN (H): ICD-10-CM

## 2022-10-06 DIAGNOSIS — M79.2 NEUROPATHIC PAIN: ICD-10-CM

## 2022-10-06 DIAGNOSIS — E11.69 TYPE 2 DIABETES MELLITUS WITH OTHER SPECIFIED COMPLICATION, WITHOUT LONG-TERM CURRENT USE OF INSULIN (H): ICD-10-CM

## 2022-10-06 DIAGNOSIS — E11.9 TYPE 2 DIABETES MELLITUS WITHOUT COMPLICATION, WITHOUT LONG-TERM CURRENT USE OF INSULIN (H): ICD-10-CM

## 2022-10-11 NOTE — TELEPHONE ENCOUNTER
"Pending Prescriptions:                       Disp   Refills    gabapentin (NEURONTIN) 300 MG capsule [Pha*270 ca*1        Sig: TAKE ONE CAPSULE BY MOUTH THREE TIMES DAILY    atorvastatin (LIPITOR) 20 MG tablet [Pharm*90 tab*1        Sig: TAKE ONE TABLET BY MOUTH once daily    metFORMIN (GLUCOPHAGE XR) 500 MG 24 hr tab*180 ta*1        Sig: TAKE TWO TABLETS (1,000 mg) BY MOUTH once daily with           dinner    glipiZIDE (GLUCOTROL XL) 5 MG 24 hr tablet*90 tab*1        Sig: TAKE ONE TABLET BY MOUTH once daily    lisinopril (ZESTRIL) 20 MG tablet [Pharmac*90 tab*1        Sig: TAKE ONE TABLET BY MOUTH once daily    Routing refill request to provider for review/approval because:  Patient needs to be seen because:  due for follow up  Requested Prescriptions   Pending Prescriptions Disp Refills    gabapentin (NEURONTIN) 300 MG capsule [Pharmacy Med Name: gabapentin 300 mg capsule] 270 capsule 1     Sig: TAKE ONE CAPSULE BY MOUTH THREE TIMES DAILY       There is no refill protocol information for this order       atorvastatin (LIPITOR) 20 MG tablet [Pharmacy Med Name: atorvastatin 20 mg tablet] 90 tablet 1     Sig: TAKE ONE TABLET BY MOUTH once daily       Statins Protocol Failed - 10/11/2022  9:50 AM        Failed - LDL on file in past 12 months     Recent Labs   Lab Test 07/08/20  1530   LDL 35             Passed - No abnormal creatine kinase in past 12 months     Recent Labs   Lab Test 03/13/15  0822                   Passed - Recent (12 mo) or future (30 days) visit within the authorizing provider's specialty     Patient has had an office visit with the authorizing provider or a provider within the authorizing providers department within the previous 12 mos or has a future within next 30 days. See \"Patient Info\" tab in inbasket, or \"Choose Columns\" in Meds & Orders section of the refill encounter.              Passed - Medication is active on med list        Passed - Patient is age 18 or older          metFORMIN " "(GLUCOPHAGE XR) 500 MG 24 hr tablet [Pharmacy Med Name: metformin  mg tablet,extended release 24 hr] 180 tablet 1     Sig: TAKE TWO TABLETS (1,000 mg) BY MOUTH once daily with dinner       Biguanide Agents Failed - 10/11/2022  9:50 AM        Failed - Patient has documented A1c within the specified period of time.     If HgbA1C is 8 or greater, it needs to be on file within the past 3 months.  If less than 8, must be on file within the past 6 months.     Recent Labs   Lab Test 02/26/21  1101   A1C 5.6             Failed - Patient's CR is NOT>1.4 OR Patient's EGFR is NOT<45 within past 12 mos.     Recent Labs   Lab Test 02/26/21  1101   GFRESTIMATED >90   GFRESTBLACK >90       Recent Labs   Lab Test 02/26/21  1101   CR 0.89             Failed - Recent (6 mo) or future (30 days) visit within the authorizing provider's specialty     Patient had office visit in the last 6 months or has a visit in the next 30 days with authorizing provider or within the authorizing provider's specialty.  See \"Patient Info\" tab in inbasket, or \"Choose Columns\" in Meds & Orders section of the refill encounter.            Passed - Patient is age 10 or older        Passed - Patient does NOT have a diagnosis of CHF.        Passed - Medication is active on med list          glipiZIDE (GLUCOTROL XL) 5 MG 24 hr tablet [Pharmacy Med Name: glipizide ER 5 mg tablet, extended release 24 hr] 90 tablet 1     Sig: TAKE ONE TABLET BY MOUTH once daily       Sulfonylurea Agents Failed - 10/11/2022  9:50 AM        Failed - Patient has documented A1c within the specified period of time.     If HgbA1C is 8 or greater, it needs to be on file within the past 3 months.  If less than 8, must be on file within the past 6 months.     Recent Labs   Lab Test 02/26/21  1101   A1C 5.6             Failed - Patient has a recent creatinine (normal) within the past 12 mos.     Recent Labs   Lab Test 02/26/21  1101 03/22/19  1436 11/05/18  1220   CR 0.89   < >  --  " "  CREAT  --   --  0.6*    < > = values in this interval not displayed.       Ok to refill medication if creatinine is low          Failed - Recent (6 mo) or future (30 days) visit within the authorizing provider's specialty     Patient had office visit in the last 6 months or has a visit in the next 30 days with authorizing provider or within the authorizing provider's specialty.  See \"Patient Info\" tab in inbasket, or \"Choose Columns\" in Meds & Orders section of the refill encounter.            Passed - Medication is active on med list        Passed - Patient is age 18 or older          lisinopril (ZESTRIL) 20 MG tablet [Pharmacy Med Name: lisinopril 20 mg tablet] 90 tablet 1     Sig: TAKE ONE TABLET BY MOUTH once daily       ACE Inhibitors (Including Combos) Protocol Failed - 10/11/2022  9:50 AM        Failed - Normal serum creatinine on file in past 12 months     Recent Labs   Lab Test 02/26/21  1101 03/22/19  1436 11/05/18  1220   CR 0.89   < >  --    CREAT  --   --  0.6*    < > = values in this interval not displayed.       Ok to refill medication if creatinine is low          Failed - Normal serum potassium on file in past 12 months     Recent Labs   Lab Test 02/26/21  1101   POTASSIUM 4.7             Passed - Blood pressure under 140/90 in past 12 months     BP Readings from Last 3 Encounters:   03/22/22 132/80   05/13/21 130/70   05/06/21 (!) 142/78                 Passed - Recent (12 mo) or future (30 days) visit within the authorizing provider's specialty     Patient has had an office visit with the authorizing provider or a provider within the authorizing providers department within the previous 12 mos or has a future within next 30 days. See \"Patient Info\" tab in inbasket, or \"Choose Columns\" in Meds & Orders section of the refill encounter.              Passed - Medication is active on med list        Passed - Patient is age 18 or older                 "

## 2022-10-12 RX ORDER — METFORMIN HCL 500 MG
TABLET, EXTENDED RELEASE 24 HR ORAL
Qty: 60 TABLET | Refills: 0 | Status: SHIPPED | OUTPATIENT
Start: 2022-10-12 | End: 2023-02-09

## 2022-10-12 RX ORDER — ATORVASTATIN CALCIUM 20 MG/1
TABLET, FILM COATED ORAL
Qty: 30 TABLET | Refills: 1 | Status: SHIPPED | OUTPATIENT
Start: 2022-10-12 | End: 2023-03-07

## 2022-10-12 RX ORDER — GLIPIZIDE 5 MG/1
TABLET, FILM COATED, EXTENDED RELEASE ORAL
Qty: 30 TABLET | Refills: 1 | Status: SHIPPED | OUTPATIENT
Start: 2022-10-12 | End: 2023-03-07

## 2022-10-12 RX ORDER — GABAPENTIN 300 MG/1
CAPSULE ORAL
Qty: 90 CAPSULE | Refills: 1 | Status: SHIPPED | OUTPATIENT
Start: 2022-10-12 | End: 2023-03-07

## 2022-10-12 RX ORDER — LISINOPRIL 20 MG/1
TABLET ORAL
Qty: 30 TABLET | Refills: 1 | Status: SHIPPED | OUTPATIENT
Start: 2022-10-12 | End: 2023-02-07

## 2022-10-13 NOTE — TELEPHONE ENCOUNTER
Attempted to reach the patient with the following information.  Left message for patient to return call to clinic.     Gema Elliott MA

## 2023-02-02 DIAGNOSIS — E11.9 TYPE 2 DIABETES MELLITUS WITHOUT COMPLICATION, WITHOUT LONG-TERM CURRENT USE OF INSULIN (H): ICD-10-CM

## 2023-02-02 NOTE — LETTER
February 10, 2023      Estevan Callejas  [address]  [place] [address]              Dear Estevan,    Our clinic has been attempting to reach out to you. We need you to schedule a follow up with your current provider Dr. Barrera. In order to get more future refills on your lisinopril 20 mg tablets.    Please give our clinic a call at [phone] to schedule your appointment.        Sincerely,      [facility]

## 2023-02-03 NOTE — TELEPHONE ENCOUNTER
Pending Prescriptions:                       Disp   Refills    lisinopril (ZESTRIL) 20 MG tablet [Pharmac*30 tab*1        Sig: TAKE ONE TABLET BY MOUTH once daily      Routing refill request to provider for review/approval because:  Labs not current:  creatinine, potassium    Deysi Holm RN on 2/3/2023 at 11:58 AM

## 2023-02-07 RX ORDER — LISINOPRIL 20 MG/1
TABLET ORAL
Qty: 30 TABLET | Refills: 0 | Status: SHIPPED | OUTPATIENT
Start: 2023-02-07

## 2023-02-08 DIAGNOSIS — E11.9 TYPE 2 DIABETES MELLITUS WITHOUT COMPLICATION, WITHOUT LONG-TERM CURRENT USE OF INSULIN (H): ICD-10-CM

## 2023-02-08 NOTE — TELEPHONE ENCOUNTER
#2    Attempted tor reach Prashanth today to schedule follow up. No answer. [facility]

## 2023-02-09 RX ORDER — METFORMIN HCL 500 MG
TABLET, EXTENDED RELEASE 24 HR ORAL
Qty: 60 TABLET | Refills: 0 | Status: SHIPPED | OUTPATIENT
Start: 2023-02-09

## 2023-06-21 DIAGNOSIS — E11.8 TYPE 2 DIABETES MELLITUS WITH COMPLICATION, WITHOUT LONG-TERM CURRENT USE OF INSULIN (H): ICD-10-CM

## 2023-06-21 RX ORDER — ATORVASTATIN CALCIUM 20 MG/1
20 TABLET, FILM COATED ORAL DAILY
Qty: 30 TABLET | Refills: 0 | Status: SHIPPED | OUTPATIENT
Start: 2023-06-21

## 2023-06-21 NOTE — TELEPHONE ENCOUNTER
Due to establish care with new provider and physical with med check and fasting labs.     Félix Lion PA-C

## 2024-10-14 ENCOUNTER — OFFICE VISIT (OUTPATIENT)
Dept: FAMILY MEDICINE | Facility: OTHER | Age: 49
End: 2024-10-14
Payer: MEDICAID

## 2024-10-14 VITALS
OXYGEN SATURATION: 96 % | TEMPERATURE: 97.7 F | BODY MASS INDEX: 31.92 KG/M2 | SYSTOLIC BLOOD PRESSURE: 128 MMHG | RESPIRATION RATE: 17 BRPM | HEART RATE: 98 BPM | WEIGHT: 215.5 LBS | HEIGHT: 69 IN | DIASTOLIC BLOOD PRESSURE: 72 MMHG

## 2024-10-14 DIAGNOSIS — Z12.11 SCREEN FOR COLON CANCER: ICD-10-CM

## 2024-10-14 DIAGNOSIS — I10 BENIGN ESSENTIAL HYPERTENSION: ICD-10-CM

## 2024-10-14 DIAGNOSIS — Z11.59 NEED FOR HEPATITIS C SCREENING TEST: ICD-10-CM

## 2024-10-14 DIAGNOSIS — E78.5 HYPERLIPIDEMIA LDL GOAL <100: ICD-10-CM

## 2024-10-14 DIAGNOSIS — F41.1 GAD (GENERALIZED ANXIETY DISORDER): ICD-10-CM

## 2024-10-14 DIAGNOSIS — F33.1 MODERATE RECURRENT MAJOR DEPRESSION (H): ICD-10-CM

## 2024-10-14 DIAGNOSIS — E11.8 TYPE 2 DIABETES MELLITUS WITH COMPLICATION, WITHOUT LONG-TERM CURRENT USE OF INSULIN (H): ICD-10-CM

## 2024-10-14 DIAGNOSIS — Z11.4 SCREENING FOR HIV (HUMAN IMMUNODEFICIENCY VIRUS): ICD-10-CM

## 2024-10-14 DIAGNOSIS — H53.8 BLURRING OF VISUAL IMAGE OF RIGHT EYE: ICD-10-CM

## 2024-10-14 DIAGNOSIS — F10.11 HISTORY OF ALCOHOL ABUSE: ICD-10-CM

## 2024-10-14 DIAGNOSIS — Z00.00 ROUTINE HISTORY AND PHYSICAL EXAMINATION OF ADULT: Primary | ICD-10-CM

## 2024-10-14 LAB
ALBUMIN SERPL BCG-MCNC: 4.2 G/DL (ref 3.5–5.2)
ALP SERPL-CCNC: 98 U/L (ref 40–150)
ALT SERPL W P-5'-P-CCNC: 21 U/L (ref 0–70)
ANION GAP SERPL CALCULATED.3IONS-SCNC: 15 MMOL/L (ref 7–15)
AST SERPL W P-5'-P-CCNC: 16 U/L (ref 0–45)
BILIRUB SERPL-MCNC: 0.2 MG/DL
BUN SERPL-MCNC: 9 MG/DL (ref 6–20)
CALCIUM SERPL-MCNC: 9.5 MG/DL (ref 8.8–10.4)
CHLORIDE SERPL-SCNC: 100 MMOL/L (ref 98–107)
CHOLEST SERPL-MCNC: 226 MG/DL
CREAT SERPL-MCNC: 0.69 MG/DL (ref 0.67–1.17)
EGFRCR SERPLBLD CKD-EPI 2021: >90 ML/MIN/1.73M2
ERYTHROCYTE [DISTWIDTH] IN BLOOD BY AUTOMATED COUNT: 12.1 % (ref 10–15)
EST. AVERAGE GLUCOSE BLD GHB EST-MCNC: 146 MG/DL
FASTING STATUS PATIENT QL REPORTED: NO
FASTING STATUS PATIENT QL REPORTED: NO
GLUCOSE SERPL-MCNC: 193 MG/DL (ref 70–99)
HBA1C MFR BLD: 6.7 % (ref 0–5.6)
HCO3 SERPL-SCNC: 21 MMOL/L (ref 22–29)
HCT VFR BLD AUTO: 45.3 % (ref 40–53)
HDLC SERPL-MCNC: 56 MG/DL
HGB BLD-MCNC: 15.3 G/DL (ref 13.3–17.7)
LDLC SERPL CALC-MCNC: 111 MG/DL
MCH RBC QN AUTO: 33 PG (ref 26.5–33)
MCHC RBC AUTO-ENTMCNC: 33.8 G/DL (ref 31.5–36.5)
MCV RBC AUTO: 98 FL (ref 78–100)
NONHDLC SERPL-MCNC: 170 MG/DL
PLATELET # BLD AUTO: 242 10E3/UL (ref 150–450)
POTASSIUM SERPL-SCNC: 4 MMOL/L (ref 3.4–5.3)
PROT SERPL-MCNC: 7.6 G/DL (ref 6.4–8.3)
RBC # BLD AUTO: 4.64 10E6/UL (ref 4.4–5.9)
SODIUM SERPL-SCNC: 136 MMOL/L (ref 135–145)
TRIGL SERPL-MCNC: 295 MG/DL
TSH SERPL DL<=0.005 MIU/L-ACNC: 2.74 UIU/ML (ref 0.3–4.2)
WBC # BLD AUTO: 12.4 10E3/UL (ref 4–11)

## 2024-10-14 PROCEDURE — 85027 COMPLETE CBC AUTOMATED: CPT | Performed by: PHYSICIAN ASSISTANT

## 2024-10-14 PROCEDURE — 90471 IMMUNIZATION ADMIN: CPT | Performed by: PHYSICIAN ASSISTANT

## 2024-10-14 PROCEDURE — 99214 OFFICE O/P EST MOD 30 MIN: CPT | Mod: 25 | Performed by: PHYSICIAN ASSISTANT

## 2024-10-14 PROCEDURE — 99207 PR FOOT EXAM NO CHARGE: CPT | Performed by: PHYSICIAN ASSISTANT

## 2024-10-14 PROCEDURE — 90715 TDAP VACCINE 7 YRS/> IM: CPT | Performed by: PHYSICIAN ASSISTANT

## 2024-10-14 PROCEDURE — 99396 PREV VISIT EST AGE 40-64: CPT | Mod: 25 | Performed by: PHYSICIAN ASSISTANT

## 2024-10-14 PROCEDURE — 84443 ASSAY THYROID STIM HORMONE: CPT | Performed by: PHYSICIAN ASSISTANT

## 2024-10-14 PROCEDURE — 80053 COMPREHEN METABOLIC PANEL: CPT | Performed by: PHYSICIAN ASSISTANT

## 2024-10-14 PROCEDURE — 83036 HEMOGLOBIN GLYCOSYLATED A1C: CPT | Performed by: PHYSICIAN ASSISTANT

## 2024-10-14 PROCEDURE — 80061 LIPID PANEL: CPT | Performed by: PHYSICIAN ASSISTANT

## 2024-10-14 PROCEDURE — 36415 COLL VENOUS BLD VENIPUNCTURE: CPT | Performed by: PHYSICIAN ASSISTANT

## 2024-10-14 RX ORDER — CITALOPRAM HYDROBROMIDE 40 MG/1
40 TABLET ORAL DAILY
Qty: 30 TABLET | Refills: 0 | Status: SHIPPED | OUTPATIENT
Start: 2024-10-14

## 2024-10-14 ASSESSMENT — ANXIETY QUESTIONNAIRES
7. FEELING AFRAID AS IF SOMETHING AWFUL MIGHT HAPPEN: SEVERAL DAYS
4. TROUBLE RELAXING: SEVERAL DAYS
GAD7 TOTAL SCORE: 8
IF YOU CHECKED OFF ANY PROBLEMS ON THIS QUESTIONNAIRE, HOW DIFFICULT HAVE THESE PROBLEMS MADE IT FOR YOU TO DO YOUR WORK, TAKE CARE OF THINGS AT HOME, OR GET ALONG WITH OTHER PEOPLE: VERY DIFFICULT
GAD7 TOTAL SCORE: 8
3. WORRYING TOO MUCH ABOUT DIFFERENT THINGS: SEVERAL DAYS
7. FEELING AFRAID AS IF SOMETHING AWFUL MIGHT HAPPEN: SEVERAL DAYS
2. NOT BEING ABLE TO STOP OR CONTROL WORRYING: SEVERAL DAYS
8. IF YOU CHECKED OFF ANY PROBLEMS, HOW DIFFICULT HAVE THESE MADE IT FOR YOU TO DO YOUR WORK, TAKE CARE OF THINGS AT HOME, OR GET ALONG WITH OTHER PEOPLE?: VERY DIFFICULT
1. FEELING NERVOUS, ANXIOUS, OR ON EDGE: MORE THAN HALF THE DAYS
5. BEING SO RESTLESS THAT IT IS HARD TO SIT STILL: SEVERAL DAYS
GAD7 TOTAL SCORE: 8
6. BECOMING EASILY ANNOYED OR IRRITABLE: SEVERAL DAYS

## 2024-10-14 ASSESSMENT — PATIENT HEALTH QUESTIONNAIRE - PHQ9
10. IF YOU CHECKED OFF ANY PROBLEMS, HOW DIFFICULT HAVE THESE PROBLEMS MADE IT FOR YOU TO DO YOUR WORK, TAKE CARE OF THINGS AT HOME, OR GET ALONG WITH OTHER PEOPLE: SOMEWHAT DIFFICULT
SUM OF ALL RESPONSES TO PHQ QUESTIONS 1-9: 9
SUM OF ALL RESPONSES TO PHQ QUESTIONS 1-9: 9

## 2024-10-14 ASSESSMENT — PAIN SCALES - GENERAL: PAINLEVEL: MODERATE PAIN (5)

## 2024-10-14 ASSESSMENT — ENCOUNTER SYMPTOMS: NERVOUS/ANXIOUS: 1

## 2024-10-14 NOTE — PROGRESS NOTES
"  Assessment & Plan     Routine history and physical examination of adult  49-year-old male here for routine physical repeat in 1 year.  - CBC with platelets; Future  - Comprehensive metabolic panel (BMP + Alb, Alk Phos, ALT, AST, Total. Bili, TP); Future    Type 2 diabetes mellitus with complication, without long-term current use of insulin (H)  Patient is a noncompliant diabetic who has been treating himself for probably close to 2 years.  Related labs are pending today.  Will need to make decisions on medications based on results.  He is hesitant to go back on metformin because of all things he has \"heard about it\".  We may need to make decisions based on that.    - OPTOMETRY REFERRAL; Future  - Lipid panel reflex to direct LDL Non-fasting; Future  - Albumin Random Urine Quantitative with Creat Ratio; Future  - HEMOGLOBIN A1C; Future  - TSH with free T4 reflex; Future  - Adult Eye  Referral; Future    Benign essential hypertension  Good control this at this point in time with no new concerns.  Follow-up in 6 months.    CORRY (generalized anxiety disorder)  Moderate recurrent major depression (H)  History of alcohol abuse  Pathophysiology is discussed.  Advise counseling and medication start follow-up in 4 to 6 weeks.  - Adult Mental Health  Referral; Future  - citalopram (CELEXA) 40 MG tablet; Take 1 tablet (40 mg) by mouth daily. Take 1/2 tablet (20 mg) for 1-2 weeks, then increase to 1 tablet orally daily  - Adult Eye  Referral; Future    Hyperlipidemia LDL goal <100  - Lipid panel reflex to direct LDL Non-fasting; Future  - Comprehensive metabolic panel (BMP + Alb, Alk Phos, ALT, AST, Total. Bili, TP); Future    Blurring of visual image of right eye  - Adult Eye  Referral; Future    Screen for colon cancer  - Colonoscopy Screening  Referral; Future    Need for hepatitis C screening test  Screening for HIV (human immunodeficiency virus)  Declined screening consider " "himself low risk.    Nicotine/Tobacco Cessation  He reports that he has been smoking dip, chew, snus or snuff and cigarettes. He has been exposed to tobacco smoke. His smokeless tobacco use includes chew.  Nicotine/Tobacco Cessation Plan  Phone counseling: Place order for Quit Partner Referral      BMI  Estimated body mass index is 31.41 kg/m  as calculated from the following:    Height as of this encounter: 1.764 m (5' 9.45\").    Weight as of this encounter: 97.8 kg (215 lb 8 oz).   Weight management plan: Discussed healthy diet and exercise guidelines    Depression Screening Follow Up        10/14/2024     5:10 PM   PHQ   PHQ-9 Total Score 9   Q9: Thoughts of better off dead/self-harm past 2 weeks Several days   F/U: Thoughts of suicide or self-harm No   F/U: Safety concerns No         10/14/2024     5:10 PM   Last PHQ-9   1.  Little interest or pleasure in doing things 3   2.  Feeling down, depressed, or hopeless 1   3.  Trouble falling or staying asleep, or sleeping too much 1   4.  Feeling tired or having little energy 1   5.  Poor appetite or overeating 0   6.  Feeling bad about yourself 1   7.  Trouble concentrating 1   8.  Moving slowly or restless 0   Q9: Thoughts of better off dead/self-harm past 2 weeks 1   PHQ-9 Total Score 9   In the past two weeks have you had thoughts of suicide or self harm? No   Do you have concerns about your personal safety or the safety of others? No     Follow Up Actions Taken  Crisis resource information provided in the After Visit Summary  Mental Health Referral placed    Discussed the following ways the patient can remain in a safe environment:  remove alcohol, remove drugs, dispose of old medications , and be around others    Work on weight loss  Regular exercise    Subjective   Prashanth is a 49 year old, presenting for the following health issues:  Establish Care, Anxiety, and Depression      10/14/2024     5:03 PM   Additional Questions   Roomed by Dulce BENITEZ   Accompanied by " "Genny, Girlfriend     Anxiety    History of Present Illness       Reason for visit:  Establish care, depression, anxiety        Abnormal Mood Symptoms  Onset/Duration: a while  Description: depressed financially and about kids, social anxiety, too much commotion in public  Depression (if yes, do PHQ-9): YES  Anxiety (if yes, do CORRY-7): YES  Accompanying Signs & Symptoms:  Still participating in activities that you used to enjoy: No  Fatigue: No  Irritability: YES  Difficulty concentrating: YES- at times  Changes in appetite: No  Problems with sleep: YES- at times  Heart racing/beating fast: YES  Abnormally elevated, expansive, or irritable mood: YES  Persistently increased activity or energy: No  Thoughts of hurting yourself or others: not recently, but has in the past  History:  Recent stress or major life event: YES- family   Prior depression or anxiety: last couple of years  Family history of depression or anxiety: not aware of any, maybe sister  Alcohol/drug use: YES  Difficulty sleeping: No  Precipitating or alleviating factors: mind starts racing, public spaces  Therapies tried and outcome: self medication - alcohol/cigarettes       10/14/2024     5:10 PM   PHQ   PHQ-9 Total Score 9   Q9: Thoughts of better off dead/self-harm past 2 weeks Several days   F/U: Thoughts of suicide or self-harm No   F/U: Safety concerns No         10/14/2024     5:12 PM   CORRY-7 SCORE   Total Score 8 (mild anxiety)   Total Score 8       Review of Systems  Constitutional, HEENT, cardiovascular, pulmonary, GI, , musculoskeletal, neuro, skin, endocrine and psych systems are negative, except as otherwise noted.      Objective    /72 (Cuff Size: Adult Regular)   Pulse 98   Temp 97.7  F (36.5  C)   Resp 17   Ht 1.764 m (5' 9.45\")   Wt 97.8 kg (215 lb 8 oz)   SpO2 96%   BMI 31.41 kg/m    Body mass index is 31.41 kg/m .  Physical Exam   GENERAL: alert and no distress  EYES: Eyes grossly normal to inspection, PERRL and " conjunctivae and sclerae normal  HENT: ear canals and TM's normal, nose and mouth without ulcers or lesions  NECK: no adenopathy, no asymmetry, masses, or scars  RESP: lungs clear to auscultation - no rales, rhonchi or wheezes  CV: regular rate and rhythm, normal S1 S2, no S3 or S4, no murmur, click or rub, no peripheral edema  ABDOMEN: soft, nontender, no hepatosplenomegaly, no masses and bowel sounds normal  MS: no gross musculoskeletal defects noted, no edema  SKIN: no suspicious lesions or rashes  NEURO: Normal strength and tone, mentation intact and speech normal  PSYCH: mentation appears normal, affect normal/bright    No results found for this or any previous visit (from the past 24 hour(s)).        Signed Electronically by: Thom Nieves PA-C

## 2024-10-14 NOTE — LETTER
October 15, 2024      Prashanth Callejas  [address]  [place] [address]        Dear ,    We are writing to inform you of your test results.    Thyroid-stimulating hormone within normal limits.      Cholesterol shows a fairly good HDL at 56.  This should be above 50 for long-term heart health.   Your LDL cholesterol is a little bit higher than I would like to see it at 111.  Lifestyle modifications with diet and exercise strongly encouraged.  Based on the presence of diabetes would recommend that you start a very low-dose cholesterol-lowering medication like Lipitor or Crestor at 10 mg daily and follow-up in 3 months.     Electrolytes, kidney and liver function are consistent with diabetes.  No concerns here.     Surprisingly very good control of diabetes at this point in time.  I do think it cedeno to begin a very low dose of glyburide at 1.25 mg daily or an extended metformin at 500 mg a day and recheck in 3 months.     Slight elevation in white blood cell count does not appear to be anything new for you.   We should watch this as well and double check it in 3 months.       Resulted Orders   Lipid panel reflex to direct LDL Non-fasting   Result Value Ref Range    Cholesterol 226 (H) <200 mg/dL    Triglycerides 295 (H) <150 mg/dL    Direct Measure HDL 56 >=40 mg/dL    LDL Cholesterol Calculated 111 (H) <100 mg/dL    Non HDL Cholesterol 170 (H) <130 mg/dL    Patient Fasting > 8hrs? No     Narrative    Cholesterol  Desirable: < 200 mg/dL  Borderline High: 200 - 239 mg/dL  High: >= 240 mg/dL    Triglycerides  Normal: < 150 mg/dL  Borderline High: 150 - 199 mg/dL  High: 200-499 mg/dL  Very High: >= 500 mg/dL    Direct Measure HDL  Female: >= 50 mg/dL   Male: >= 40 mg/dL    LDL Cholesterol  Desirable: < 100 mg/dL  Above Desirable: 100 - 129 mg/dL   Borderline High: 130 - 159 mg/dL   High:  160 - 189 mg/dL   Very High: >= 190 mg/dL    Non HDL Cholesterol  Desirable: < 130 mg/dL  Above Desirable: 130 - 159  mg/dL  Borderline High: 160 - 189 mg/dL  High: 190 - 219 mg/dL  Very High: >= 220 mg/dL   HEMOGLOBIN A1C   Result Value Ref Range    Estimated Average Glucose 146 (H) <117 mg/dL    Hemoglobin A1C 6.7 (H) 0.0 - 5.6 %      Comment:      Normal <5.7%   Prediabetes 5.7-6.4%    Diabetes 6.5% or higher     Note: Adopted from ADA consensus guidelines.   CBC with platelets   Result Value Ref Range    WBC Count 12.4 (H) 4.0 - 11.0 10e3/uL    RBC Count 4.64 4.40 - 5.90 10e6/uL    Hemoglobin 15.3 13.3 - 17.7 g/dL    Hematocrit 45.3 40.0 - 53.0 %    MCV 98 78 - 100 fL    MCH 33.0 26.5 - 33.0 pg    MCHC 33.8 31.5 - 36.5 g/dL    RDW 12.1 10.0 - 15.0 %    Platelet Count 242 150 - 450 10e3/uL   Comprehensive metabolic panel (BMP + Alb, Alk Phos, ALT, AST, Total. Bili, TP)   Result Value Ref Range    Sodium 136 135 - 145 mmol/L    Potassium 4.0 3.4 - 5.3 mmol/L    Carbon Dioxide (CO2) 21 (L) 22 - 29 mmol/L    Anion Gap 15 7 - 15 mmol/L    Urea Nitrogen 9.0 6.0 - 20.0 mg/dL    Creatinine 0.69 0.67 - 1.17 mg/dL    GFR Estimate >90 >60 mL/min/1.73m2      Comment:      eGFR calculated using 2021 CKD-EPI equation.    Calcium 9.5 8.8 - 10.4 mg/dL      Comment:      Reference intervals for this test were updated on 7/16/2024 to reflect our healthy population more accurately. There may be differences in the flagging of prior results with similar values performed with this method. Those prior results can be interpreted in the context of the updated reference intervals.    Chloride 100 98 - 107 mmol/L    Glucose 193 (H) 70 - 99 mg/dL    Alkaline Phosphatase 98 40 - 150 U/L    AST 16 0 - 45 U/L    ALT 21 0 - 70 U/L    Protein Total 7.6 6.4 - 8.3 g/dL    Albumin 4.2 3.5 - 5.2 g/dL    Bilirubin Total 0.2 <=1.2 mg/dL    Patient Fasting > 8hrs? No    TSH with free T4 reflex   Result Value Ref Range    TSH 2.74 0.30 - 4.20 uIU/mL       If you have any questions or concerns, please call the clinic at the number listed above.        Sincerely,      Thom Nieves PA-C

## 2024-10-24 ENCOUNTER — TELEPHONE (OUTPATIENT)
Dept: FAMILY MEDICINE | Facility: OTHER | Age: 49
End: 2024-10-24
Payer: MEDICAID

## 2024-10-24 DIAGNOSIS — E11.8 TYPE 2 DIABETES MELLITUS WITH COMPLICATION, WITHOUT LONG-TERM CURRENT USE OF INSULIN (H): ICD-10-CM

## 2024-10-24 DIAGNOSIS — E78.5 HYPERLIPIDEMIA LDL GOAL <100: Primary | ICD-10-CM

## 2024-10-24 RX ORDER — GLYBURIDE 1.25 MG/1
1.25 TABLET ORAL
Qty: 90 TABLET | Refills: 1 | Status: SHIPPED | OUTPATIENT
Start: 2024-10-24

## 2024-10-24 RX ORDER — ROSUVASTATIN CALCIUM 10 MG/1
10 TABLET, COATED ORAL DAILY
Qty: 90 TABLET | Refills: 1 | Status: SHIPPED | OUTPATIENT
Start: 2024-10-24

## 2024-10-24 NOTE — TELEPHONE ENCOUNTER
Pt would be ok starting Cholesterol medication and the diabetic medication per lab result letter. Please send to [facility]. 3 month follow up scheduled.

## 2024-10-25 ENCOUNTER — OFFICE VISIT (OUTPATIENT)
Dept: FAMILY MEDICINE | Facility: CLINIC | Age: 49
End: 2024-10-25
Payer: OTHER MISCELLANEOUS

## 2024-10-25 VITALS
HEIGHT: 70 IN | DIASTOLIC BLOOD PRESSURE: 72 MMHG | TEMPERATURE: 98.1 F | SYSTOLIC BLOOD PRESSURE: 126 MMHG | BODY MASS INDEX: 31.5 KG/M2 | WEIGHT: 220 LBS | OXYGEN SATURATION: 97 % | RESPIRATION RATE: 18 BRPM | HEART RATE: 68 BPM

## 2024-10-25 DIAGNOSIS — M79.671 RIGHT FOOT PAIN: ICD-10-CM

## 2024-10-25 DIAGNOSIS — F17.200 SMOKER: ICD-10-CM

## 2024-10-25 DIAGNOSIS — E11.8 TYPE 2 DIABETES MELLITUS WITH COMPLICATION, WITHOUT LONG-TERM CURRENT USE OF INSULIN (H): ICD-10-CM

## 2024-10-25 DIAGNOSIS — S96.821D LACERATION OF EXTENSOR TENDON OF RIGHT FOOT, SUBSEQUENT ENCOUNTER: ICD-10-CM

## 2024-10-25 DIAGNOSIS — E78.5 HYPERLIPIDEMIA LDL GOAL <100: ICD-10-CM

## 2024-10-25 DIAGNOSIS — Z01.818 PREOP GENERAL PHYSICAL EXAM: Primary | ICD-10-CM

## 2024-10-25 PROCEDURE — 93000 ELECTROCARDIOGRAM COMPLETE: CPT | Performed by: FAMILY MEDICINE

## 2024-10-25 PROCEDURE — 99214 OFFICE O/P EST MOD 30 MIN: CPT | Performed by: FAMILY MEDICINE

## 2024-10-25 ASSESSMENT — PAIN SCALES - GENERAL: PAINLEVEL_OUTOF10: SEVERE PAIN (6)

## 2024-10-25 NOTE — PROGRESS NOTES
Preoperative Evaluation  [address] [address]  Phone: [phone]  Fax: [phone]  Primary Provider: Physician No Ref-Primary  Pre-op Performing Provider: Nicolette Ruiz MD  Oct 25, 2024             10/25/2024   Surgical Information   What procedure is being done? pre-po    Facility or Hospital where procedure/surgery will be performed: [facility]    Who is doing the procedure / surgery? can'tRemember    Date of surgery / procedure: [id number]    Time of surgery / procedure: don'tknow    Where do you plan to recover after surgery? at home with family        Patient-reported     Fax number for surgical facility: [phone]    Assessment & Plan     The proposed surgical procedure is considered INTERMEDIATE risk.    (Z01.818) Preop general physical exam  (primary encounter diagnosis)  Comment: 49-year-old male with a history of type 2 diabetes and smoking with a traumatic injury to the right foot first evaluated in the ER 10/22/2024.  Sent for surgical revision of this tendon.  Meant to be nonweightbearing per podiatry notes. Crutches rx given today     (S96.821D) Laceration of extensor tendon of right foot, subsequent encounter  Comment: Patient with traumatic injury to the right foot causing a laceration of the extensor tendon of the great hallux.  Set for surgical repair but unsure of dating.  Today we have a preoperative examination and he is cleared for procedure  Plan: Crutches Order for DME - ONLY FOR DME    (M79.671) Right foot pain  Comment: Rx for crutches sent  Plan: Crutches Order for DME - ONLY FOR DME    (F17.200) Smoker  Comment: We did discuss the impact of smoking on wound healing.  Patient is amenable to trial of nicotine supplementation especially during the perioperative phase.  Rx sent for nicotine gum  Plan: nicotine (NICORETTE) 2 MG gum    (E78.5) Hyperlipidemia LDL goal <100  Comment: Generally well-controlled on statin.  Currently  on rosuvastatin    (E11.8) Type 2 diabetes mellitus with complication, without long-term current use of insulin (H)  Comment: A1c last at 6.7.  Renal function and liver function within normal limits on last labs in early October            - No identified additional risk factors other than previously addressed         Recommendation  Approval given to proceed with proposed procedure, without further diagnostic evaluation.    Zach Alford is a 49 year old, presenting for the following:  Pre-Op Exam (Right foot tendon )          10/25/2024     3:14 PM   Additional Questions   Roomed by Betzaida CARDONA related to upcoming procedure: Patient is a 49-year-old male with a history of diabetes as well as smoking who presented to the ER on 10/22/2024 with a laceration to his right great toe/foot.  He was diagnosed with of tendon laceration.  He was seen yesterday with podiatry and they are recommending intraoperative repair of the tendon for the great hallux on the right.  They recommended that he be nonweightbearing as well.  Unfortunately patient has not received crutches yet.  Will work on this with him.  He does have a history of diabetes and a history of morbid obesity in the past.  Given his smoking we did an EKG today which appeared to be benign.        10/25/2024   Pre-Op Questionnaire   Have you ever had a heart attack or stroke? No    Have you ever had surgery on your heart or blood vessels, such as a stent placement, a coronary artery bypass, or surgery on an artery in your head, neck, heart, or legs? No    Do you have chest pain with activity? No    Do you have a history of heart failure? No    Do you currently have a cold, bronchitis or symptoms of other infection? No    Do you have a cough, shortness of breath, or wheezing? No    Do you or anyone in your family have previous history of blood clots? No    Do you or does anyone in your family have a serious bleeding problem such as prolonged bleeding following  surgeries or cuts? No    Have you ever had problems with anemia or been told to take iron pills? No    Have you had any abnormal blood loss such as black, tarry or bloody stools? No    Have you ever had a blood transfusion? No    Are you willing to have a blood transfusion if it is medically needed before, during, or after your surgery? Yes    Have you or any of your relatives ever had problems with anesthesia? No    Do you have sleep apnea, excessive snoring or daytime drowsiness? No    Do you have any artifical heart valves or other implanted medical devices like a pacemaker, defibrillator, or continuous glucose monitor? No    Do you have artificial joints? No    Are you allergic to latex? No        Patient-reported     Health Care Directive  Patient does not have a Health Care Directive: Discussed advance care planning with patient; however, patient declined at this time.    Preoperative Review of    reviewed - no record of controlled substances prescribed.          Patient Active Problem List    Diagnosis Date Noted    Moderate recurrent major depression (H) 10/14/2024     Priority: Medium    CORRY (generalized anxiety disorder) 10/14/2024     Priority: Medium    History of alcohol abuse 10/14/2024     Priority: Medium    Blurring of visual image of right eye 10/14/2024     Priority: Medium    Hyperlipidemia LDL goal <100 10/14/2024     Priority: Medium    Perineal sinus 02/22/2021     Priority: Medium     Added automatically from request for surgery [id number]      Benign essential hypertension 10/16/2020     Priority: Medium    Microalbuminuria 07/12/2020     Priority: Medium    Tobacco use disorder 07/01/2018     Priority: Medium    Epidermal cyst of face 01/30/2018     Priority: Medium    Perineal abscess 01/13/2018     Priority: Medium    Morbid obesity (H) 06/12/2017     Priority: Medium    Low back pain 03/06/2015     Priority: Medium    Type 2 diabetes mellitus with complication, without long-term  current use of insulin (H) 02/25/2015     Priority: Medium    Peripheral neuropathy - near S2 dermatome 02/18/2015     Priority: Medium      Past Medical History:   Diagnosis Date    Diabetes (H)      Past Surgical History:   Procedure Laterality Date    EXAM UNDER ANESTHESIA, CHANGE DRESSING (LOCATION), COMBINED N/A 1/15/2018    Procedure: COMBINED EXAM UNDER ANESTHESIA, CHANGE DRESSING (LOCATION);  Dressing Change and exam under Monitored Anesthesia Care with wound vac placement;  Surgeon: Stefania Munroe MD;  Location: [facility] OR    EXAM UNDER ANESTHESIA, CHANGE DRESSING (LOCATION), COMBINED N/A 1/18/2018    Procedure: COMBINED EXAM UNDER ANESTHESIA, CHANGE DRESSING (LOCATION);  Serial wound vac change perineal;  Surgeon: Stefania Munroe MD;  Location: [facility] OR    INCISION AND DRAINAGE PERINEAL, COMBINED N/A 3/1/2021    Procedure: INCISION AND DRAINAGE, PERINEUM;  Surgeon: Antwan Macias MD;  Location: [facility]    IRRIGATION AND DEBRIDEMENT PERINEAL, COMBINED N/A 1/13/2018    Procedure: COMBINED IRRIGATION AND DEBRIDEMENT PERINEAL;  Irrigation and Debridement of perianal abscess;  Surgeon: Stefania Munroe MD;  Location: [facility] OR    IRRIGATION AND DEBRIDEMENT PERINEAL, COMBINED N/A 1/14/2018    Procedure: COMBINED IRRIGATION AND DEBRIDEMENT PERINEAL;  combined irrigation and debridement perineal and dressing change;  Surgeon: Stefania Munroe MD;  Location: [facility] OR    ORTHOPEDIC SURGERY      TONSILLECTOMY, ADENOIDECTOMY, COMBINED       Current Outpatient Medications   Medication Sig Dispense Refill    acetaminophen (TYLENOL) 325 MG tablet Take 2 tablets (650 mg) by mouth every 4 hours as needed for other (multimodal surgical pain management along with NSAIDS and opioid medication as indicated based on pain control and physical function.) 100 tablet     citalopram (CELEXA) 40 MG tablet Take 1 tablet (40 mg) by mouth daily. Take 1/2 tablet (20 mg) for 1-2 weeks, then  increase to 1 tablet orally daily 30 tablet 0    glyBURIDE (DIABETA /MICRONASE) 1.25 MG tablet Take 1 tablet (1.25 mg) by mouth daily (with breakfast). 90 tablet 1    Applicators (COTTON SWABS) SWAB 1 each as needed (Patient not taking: Reported on 10/25/2024) 100 each 3    aspirin 81 MG tablet Take 1 tablet (81 mg) by mouth daily (Patient not taking: Reported on 10/25/2024) 90 tablet 3    atorvastatin (LIPITOR) 20 MG tablet Take 1 tablet (20 mg) by mouth daily (Patient not taking: Reported on 10/25/2024) 30 tablet 0    Bisacodyl (LAXATIVE PO)  (Patient not taking: Reported on 10/25/2024)      blood glucose (NO BRAND SPECIFIED) lancets standard Use to test blood sugar one times daily or as directed. (Patient not taking: Reported on 10/25/2024) 100 each 3    blood glucose monitoring (NO BRAND SPECIFIED) meter device kit Use to test blood sugar one  times daily or as directed. Preferred blood glucose meter OR supplies to accompany: Blood Glucose Monitor Brands: per insurance.One touch ultra 2 meter (Patient not taking: Reported on 10/25/2024) 1 kit 0    blood glucose monitoring (NO BRAND SPECIFIED) meter device kit Use to test blood sugar one times daily or as directed. (Patient not taking: Reported on 10/25/2024) 1 kit 0    gabapentin (NEURONTIN) 300 MG capsule TAKE ONE CAPSULE BY MOUTH THREE TIMES DAILY (Patient not taking: Reported on 10/25/2024) 90 capsule 0    glipiZIDE (GLUCOTROL XL) 5 MG 24 hr tablet TAKE ONE TABLET BY MOUTH once daily (Patient not taking: Reported on 10/25/2024) 30 tablet 0    Lancets (ONETOUCH DELICA PLUS [id number]) MISC use to test blood sugars ONCE daily OR as directed (Patient not taking: Reported on 10/25/2024) 50 each 6    lisinopril (ZESTRIL) 20 MG tablet TAKE ONE TABLET BY MOUTH once daily (Patient not taking: Reported on 10/25/2024) 30 tablet 0    metFORMIN (GLUCOPHAGE XR) 500 MG 24 hr tablet TAKE TWO TABLETS (1,000 mg) BY MOUTH once daily with dinner (Patient not taking: Reported on  "10/25/2024) 60 tablet 0    ONETOUCH ULTRA test strip use to test blood sugars ONCE daily OR as directed (Patient not taking: Reported on 10/25/2024) 100 strip 3    rosuvastatin (CRESTOR) 10 MG tablet Take 1 tablet (10 mg) by mouth daily. (Patient not taking: Reported on 10/25/2024) 90 tablet 1       Allergies   Allergen Reactions    Seasonal Allergies      Sneezing, itchy eyes        Social History     Tobacco Use    Smoking status: Every Day     Current packs/day: 1.00     Types: Dip, chew, snus or snuff, Cigarettes     Passive exposure: Current    Smokeless tobacco: Current     Types: Chew   Substance Use Topics    Alcohol use: Yes     History reviewed. No pertinent family history.  History   Drug Use No           Medication review of systems: Negative for any chest pain or shortness of breath.  Does have a history of chronic cough related to smoking    Objective    /72   Pulse 68   Temp 98.1  F (36.7  C) (Temporal)   Resp 18   Ht 1.765 m (5' 9.5\")   Wt 99.8 kg (220 lb)   SpO2 97%   BMI 32.02 kg/m     Estimated body mass index is 32.02 kg/m  as calculated from the following:    Height as of this encounter: 1.765 m (5' 9.5\").    Weight as of this encounter: 99.8 kg (220 lb).  Physical Exam  Constitutional:       Appearance: Normal appearance.   HENT:      Head: Normocephalic.      Nose: Nose normal.      Mouth/Throat:      Mouth: Mucous membranes are moist.   Eyes:      Pupils: Pupils are equal, round, and reactive to light.   Cardiovascular:      Rate and Rhythm: Normal rate and regular rhythm.      Pulses: Normal pulses.   Pulmonary:      Effort: Pulmonary effort is normal. No respiratory distress.      Breath sounds: No stridor. No wheezing, rhonchi or rales.   Abdominal:      General: Abdomen is flat.      Palpations: Abdomen is soft.   Musculoskeletal:         General: Normal range of motion.   Skin:     General: Skin is warm.      Capillary Refill: Capillary refill takes less than 2 seconds. "   Neurological:      Mental Status: He is alert. Mental status is at baseline.   Psychiatric:         Mood and Affect: Mood normal.         Behavior: Behavior normal.         Thought Content: Thought content normal.         Judgment: Judgment normal.           Recent Labs   Lab Test 10/14/24  1757   HGB 15.3         POTASSIUM 4.0   CR 0.69   A1C 6.7*        Diagnostics  Recent Results (from the past 720 hours)   Lipid panel reflex to direct LDL Non-fasting    Collection Time: 10/14/24  5:57 PM   Result Value Ref Range    Cholesterol 226 (H) <200 mg/dL    Triglycerides 295 (H) <150 mg/dL    Direct Measure HDL 56 >=40 mg/dL    LDL Cholesterol Calculated 111 (H) <100 mg/dL    Non HDL Cholesterol 170 (H) <130 mg/dL    Patient Fasting > 8hrs? No    HEMOGLOBIN A1C    Collection Time: 10/14/24  5:57 PM   Result Value Ref Range    Estimated Average Glucose 146 (H) <117 mg/dL    Hemoglobin A1C 6.7 (H) 0.0 - 5.6 %   CBC with platelets    Collection Time: 10/14/24  5:57 PM   Result Value Ref Range    WBC Count 12.4 (H) 4.0 - 11.0 10e3/uL    RBC Count 4.64 4.40 - 5.90 10e6/uL    Hemoglobin 15.3 13.3 - 17.7 g/dL    Hematocrit 45.3 40.0 - 53.0 %    MCV 98 78 - 100 fL    MCH 33.0 26.5 - 33.0 pg    MCHC 33.8 31.5 - 36.5 g/dL    RDW 12.1 10.0 - 15.0 %    Platelet Count 242 150 - 450 10e3/uL   Comprehensive metabolic panel (BMP + Alb, Alk Phos, ALT, AST, Total. Bili, TP)    Collection Time: 10/14/24  5:57 PM   Result Value Ref Range    Sodium 136 135 - 145 mmol/L    Potassium 4.0 3.4 - 5.3 mmol/L    Carbon Dioxide (CO2) 21 (L) 22 - 29 mmol/L    Anion Gap 15 7 - 15 mmol/L    Urea Nitrogen 9.0 6.0 - 20.0 mg/dL    Creatinine 0.69 0.67 - 1.17 mg/dL    GFR Estimate >90 >60 mL/min/1.73m2    Calcium 9.5 8.8 - 10.4 mg/dL    Chloride 100 98 - 107 mmol/L    Glucose 193 (H) 70 - 99 mg/dL    Alkaline Phosphatase 98 40 - 150 U/L    AST 16 0 - 45 U/L    ALT 21 0 - 70 U/L    Protein Total 7.6 6.4 - 8.3 g/dL    Albumin 4.2 3.5 - 5.2  g/dL    Bilirubin Total 0.2 <=1.2 mg/dL    Patient Fasting > 8hrs? No    TSH with free T4 reflex    Collection Time: 10/14/24  5:57 PM   Result Value Ref Range    TSH 2.74 0.30 - 4.20 uIU/mL      EKG: appears normal, NSR, normal axis, normal intervals, no acute ST/T changes c/w ischemia, no LVH by voltage criteria, Right Bundle Branch Block    Revised Cardiac Risk Index (RCRI)  The patient has the following serious cardiovascular risks for perioperative complications:   - No serious cardiac risks = 0 points     RCRI Interpretation: 1 point: Class II (low risk - 0.9% complication rate)         Signed Electronically by: Nicolette Ruzi MD  A copy of this evaluation report is provided to the requesting physician.

## 2024-12-02 DIAGNOSIS — F33.1 MODERATE RECURRENT MAJOR DEPRESSION (H): ICD-10-CM

## 2024-12-02 DIAGNOSIS — F41.1 GAD (GENERALIZED ANXIETY DISORDER): ICD-10-CM

## 2024-12-02 DIAGNOSIS — F10.11 HISTORY OF ALCOHOL ABUSE: ICD-10-CM

## 2024-12-02 RX ORDER — CITALOPRAM HYDROBROMIDE 40 MG/1
40 TABLET ORAL DAILY
Qty: 30 TABLET | Refills: 1 | Status: SHIPPED | OUTPATIENT
Start: 2024-12-02

## 2025-01-11 DIAGNOSIS — F33.1 MODERATE RECURRENT MAJOR DEPRESSION (H): ICD-10-CM

## 2025-01-11 DIAGNOSIS — E78.5 HYPERLIPIDEMIA LDL GOAL <100: ICD-10-CM

## 2025-01-11 DIAGNOSIS — F10.11 HISTORY OF ALCOHOL ABUSE: ICD-10-CM

## 2025-01-11 DIAGNOSIS — F41.1 GAD (GENERALIZED ANXIETY DISORDER): ICD-10-CM

## 2025-01-13 RX ORDER — ROSUVASTATIN CALCIUM 10 MG/1
10 TABLET, COATED ORAL DAILY
Qty: 90 TABLET | Refills: 1 | OUTPATIENT
Start: 2025-01-13

## 2025-01-13 NOTE — TELEPHONE ENCOUNTER
Clinic RN: Please investigate patient's chart or contact patient if the information cannot be found because the last prescription was a taper dose (not in RN protocol). We need to know what dose patient is taking. Determine the current dose, then route to provider and ask provider to update the SIG and approve or deny the prescription.    Magdalena Mckeon, RAEN, RN

## 2025-01-14 NOTE — TELEPHONE ENCOUNTER
Called and spoke with patient. He confirms he is taking a full tablet of citalopram. Updated script, routing to provider to review and approve or deny.     Elisa MCBRIDEN, RN

## 2025-01-15 RX ORDER — CITALOPRAM HYDROBROMIDE 40 MG/1
40 TABLET ORAL DAILY
Qty: 30 TABLET | Refills: 1 | Status: SHIPPED | OUTPATIENT
Start: 2025-01-15

## 2025-02-11 ENCOUNTER — VIRTUAL VISIT (OUTPATIENT)
Dept: FAMILY MEDICINE | Facility: OTHER | Age: 50
End: 2025-02-11
Payer: COMMERCIAL

## 2025-02-11 DIAGNOSIS — F41.1 GAD (GENERALIZED ANXIETY DISORDER): ICD-10-CM

## 2025-02-11 DIAGNOSIS — E11.8 TYPE 2 DIABETES MELLITUS WITH COMPLICATION, WITHOUT LONG-TERM CURRENT USE OF INSULIN (H): ICD-10-CM

## 2025-02-11 DIAGNOSIS — E11.9 TYPE 2 DIABETES MELLITUS WITHOUT COMPLICATION, WITHOUT LONG-TERM CURRENT USE OF INSULIN (H): ICD-10-CM

## 2025-02-11 DIAGNOSIS — F33.1 MODERATE RECURRENT MAJOR DEPRESSION (H): ICD-10-CM

## 2025-02-11 PROCEDURE — 98014 SYNCH AUDIO-ONLY EST MOD 30: CPT | Performed by: STUDENT IN AN ORGANIZED HEALTH CARE EDUCATION/TRAINING PROGRAM

## 2025-02-11 PROCEDURE — 96127 BRIEF EMOTIONAL/BEHAV ASSMT: CPT | Mod: 93 | Performed by: STUDENT IN AN ORGANIZED HEALTH CARE EDUCATION/TRAINING PROGRAM

## 2025-02-11 RX ORDER — CITALOPRAM HYDROBROMIDE 40 MG/1
40 TABLET ORAL DAILY
Qty: 90 TABLET | Refills: 1 | Status: SHIPPED | OUTPATIENT
Start: 2025-02-11

## 2025-02-11 ASSESSMENT — ANXIETY QUESTIONNAIRES
IF YOU CHECKED OFF ANY PROBLEMS ON THIS QUESTIONNAIRE, HOW DIFFICULT HAVE THESE PROBLEMS MADE IT FOR YOU TO DO YOUR WORK, TAKE CARE OF THINGS AT HOME, OR GET ALONG WITH OTHER PEOPLE: NOT DIFFICULT AT ALL
GAD7 TOTAL SCORE: 0
2. NOT BEING ABLE TO STOP OR CONTROL WORRYING: NOT AT ALL
3. WORRYING TOO MUCH ABOUT DIFFERENT THINGS: NOT AT ALL
4. TROUBLE RELAXING: NOT AT ALL
8. IF YOU CHECKED OFF ANY PROBLEMS, HOW DIFFICULT HAVE THESE MADE IT FOR YOU TO DO YOUR WORK, TAKE CARE OF THINGS AT HOME, OR GET ALONG WITH OTHER PEOPLE?: NOT DIFFICULT AT ALL
5. BEING SO RESTLESS THAT IT IS HARD TO SIT STILL: NOT AT ALL
1. FEELING NERVOUS, ANXIOUS, OR ON EDGE: NOT AT ALL
6. BECOMING EASILY ANNOYED OR IRRITABLE: NOT AT ALL
GAD7 TOTAL SCORE: 0
7. FEELING AFRAID AS IF SOMETHING AWFUL MIGHT HAPPEN: NOT AT ALL
7. FEELING AFRAID AS IF SOMETHING AWFUL MIGHT HAPPEN: NOT AT ALL
GAD7 TOTAL SCORE: 0

## 2025-02-11 ASSESSMENT — PATIENT HEALTH QUESTIONNAIRE - PHQ9
SUM OF ALL RESPONSES TO PHQ QUESTIONS 1-9: 0
SUM OF ALL RESPONSES TO PHQ QUESTIONS 1-9: 0
10. IF YOU CHECKED OFF ANY PROBLEMS, HOW DIFFICULT HAVE THESE PROBLEMS MADE IT FOR YOU TO DO YOUR WORK, TAKE CARE OF THINGS AT HOME, OR GET ALONG WITH OTHER PEOPLE: NOT DIFFICULT AT ALL

## 2025-02-11 NOTE — PROGRESS NOTES
Prashanth is a 49 year old who is being evaluated via a billable telephone visit.    What phone number would you like to be contacted at? [phone]  How would you like to obtain your AVS? Mail a copy  Originating Location (pt. Location): Home    Distant Location (provider location):  Off-site  Telephone visit completed due to the patient did not consent to a video visit.    Assessment & Plan     Type 2 diabetes mellitus without complication, without long-term current use of insulin (H)  - Hemoglobin A1c; Future  Type 2 diabetes mellitus with complication, without long-term current use of insulin (H)  Due for A1c check, tolerating glyburide well.  Continue with 3-month rechecks    CORRY (generalized anxiety disorder)  - citalopram (CELEXA) 40 MG tablet; Take 1 tablet (40 mg) by mouth daily.  Moderate recurrent major depression (H)  - citalopram (CELEXA) 40 MG tablet; Take 1 tablet (40 mg) by mouth daily.  Tolerating citalopram well, no side effects.  PHQ and CORRY both reviewed and both at 0.  Much improved compared to previous visit.  Continue with 6-month rechecks              Subjective   Prashanth is a 49 year old, presenting for the following health issues:  Recheck Medication        2/11/2025     4:11 PM   Additional Questions   Roomed by Annie BAL     History of Present Illness       Mental Health Follow-up:  Patient presents to follow-up on Depression & Anxiety.Patient's depression since last visit has been:  Better  The patient is not having other symptoms associated with depression.  Patient's anxiety since last visit has been:  Better  The patient is not having other symptoms associated with anxiety.  Any significant life events: No  Patient is not feeling anxious or having panic attacks.  Patient has no concerns about alcohol or drug use.    He eats 2-3 servings of fruits and vegetables daily.He consumes 0 sweetened beverage(s) daily.He exercises with enough effort to increase his heart rate 60 or more minutes per day.   He exercises with enough effort to increase his heart rate 5 days per week.   He is taking medications regularly.             Review of Systems  Constitutional, HEENT, cardiovascular, pulmonary, gi and gu systems are negative, except as otherwise noted.      Objective           Vitals:  No vitals were obtained today due to virtual visit.    Physical Exam   General: Alert and no distress //Respiratory: No audible wheeze, cough, or shortness of breath // Psychiatric:  Appropriate affect, tone, and pace of words            Phone call duration: 6 minutes  Signed Electronically by: IRMA LUGO MD

## 2025-03-03 DIAGNOSIS — E78.5 HYPERLIPIDEMIA LDL GOAL <100: ICD-10-CM

## 2025-03-03 DIAGNOSIS — E11.8 TYPE 2 DIABETES MELLITUS WITH COMPLICATION, WITHOUT LONG-TERM CURRENT USE OF INSULIN (H): ICD-10-CM

## 2025-03-03 RX ORDER — GLYBURIDE 1.25 MG/1
1.25 TABLET ORAL
Qty: 90 TABLET | Refills: 1 | Status: SHIPPED | OUTPATIENT
Start: 2025-03-03

## 2025-03-03 RX ORDER — ROSUVASTATIN CALCIUM 10 MG/1
10 TABLET, COATED ORAL DAILY
Qty: 90 TABLET | Refills: 1 | Status: SHIPPED | OUTPATIENT
Start: 2025-03-03

## (undated) DEVICE — Device

## (undated) DEVICE — DRAPE U SPLIT 74X120" 29440

## (undated) DEVICE — SUCTION TIP YANKAUER W/O VENT K86

## (undated) DEVICE — DRSG GAUZE 4X4" TRAY 6939

## (undated) DEVICE — SPECIMEN CULTURETTE DBL SWAB 220109

## (undated) DEVICE — TEST TUBE W/SCREW CAP 17361

## (undated) DEVICE — GLOVE PROTEXIS POWDER FREE SMT 6.5  2D72PT65X

## (undated) DEVICE — SOL NACL 0.9% IRRIG 1000ML BOTTLE 07138-09

## (undated) DEVICE — DRAPE SHEET REV FOLD 3/4 9349

## (undated) DEVICE — APPLICATOR COTTON TIP 6"X2 STERILE LF 6012

## (undated) DEVICE — PREP DURAPREP 26ML APL 8630

## (undated) DEVICE — SYR 10ML SLIP TIP W/O NDL 303134

## (undated) DEVICE — PREP POVIDONE IODINE SOLUTION 10% 120ML

## (undated) DEVICE — SUCTION TIP YANKAUER STR K87

## (undated) DEVICE — BLADE KNIFE SURG 15 371115

## (undated) DEVICE — PAD CHUX UNDERPAD 23X24" 7136

## (undated) DEVICE — TUBING SUCTION 10'X3/16" N510

## (undated) DEVICE — DRAPE LEGGINGS CLEAR 8430

## (undated) DEVICE — SPONGE LAP 18X18" X8435

## (undated) DEVICE — SOL NACL 0.9% IRRIG 3000ML BAG 2B7477

## (undated) DEVICE — LINEN TOWEL PACK X6 WHITE 5487

## (undated) DEVICE — PREP SKIN SCRUB TRAY 4461A

## (undated) DEVICE — LINEN TOWEL PACK X30 5481

## (undated) DEVICE — BLADE KNIFE SURG 10 371110

## (undated) DEVICE — DRAPE MAYO STAND 23X54 8337

## (undated) DEVICE — TUBE CULTURE ANAEROBIC PORT-A-CUL 11ML

## (undated) DEVICE — LINEN TOWEL PACK X5 5464

## (undated) DEVICE — DRSG GAUZE 4X4" 2187

## (undated) DEVICE — SYR EAR BULB 3OZ 0035830

## (undated) DEVICE — GLOVE PROTEXIS W/NEU-THERA 7.5  2D73TE75

## (undated) DEVICE — SOL NACL 0.9% IRRIG 1000ML BOTTLE 2F7124

## (undated) DEVICE — SU VICRYL 2-0 TIE 12X18" J905T

## (undated) DEVICE — SUCTION IRR SYSTEM W/O TIP INTERPULSE HANDPIECE 0210-100-000

## (undated) DEVICE — CANISTER WOUND VAC W/GEL 1000ML M8275093/5

## (undated) DEVICE — DEVICE CATH STABILIZATION STATLOCK FOLEY 3-WAY FOL0105

## (undated) DEVICE — ESU PENCIL W/COATED BLADE E2450H

## (undated) DEVICE — OSTOMY STOMADHESIVE 2OZ 79300

## (undated) DEVICE — GLOVE PROTEXIS BLUE W/NEU-THERA 7.0  2D73EB70

## (undated) DEVICE — CATH TRAY FOLEY SURESTEP 16FR W/URNE MTR STLK LATEX A303316A

## (undated) DEVICE — SOL ADH LIQUID BENZOIN SWAB 0.6ML C1544

## (undated) DEVICE — DRSG ABDOMINAL 07 1/2X8" 7197D

## (undated) DEVICE — NDL BLUNT 18GA 1" W/O FILTER 305181

## (undated) DEVICE — DRAPE XRAY CASSETTE 20X23

## (undated) DEVICE — PACK MINOR PROCEDURE CUSTOM

## (undated) DEVICE — BONE CLEANING TIP INTERPULSE  0210-010-000

## (undated) DEVICE — PREP POVIDONE IODINE SCRUB 7.5% 120ML

## (undated) DEVICE — SOL WATER IRRIG 1000ML BOTTLE 2F7114

## (undated) DEVICE — TUBING SMOKE EVAC ATTACHMENT E3590

## (undated) DEVICE — DRSG KERLIX 4 1/2"X4YDS ROLL 6715

## (undated) DEVICE — SYR BULB IRRIG 50ML LATEX FREE 0035280

## (undated) DEVICE — SUCTION MANIFOLD DORNOCH ULTRA CART UL-CL500

## (undated) RX ORDER — ONDANSETRON 2 MG/ML
INJECTION INTRAMUSCULAR; INTRAVENOUS
Status: DISPENSED
Start: 2018-01-15

## (undated) RX ORDER — FENTANYL CITRATE 50 UG/ML
INJECTION, SOLUTION INTRAMUSCULAR; INTRAVENOUS
Status: DISPENSED
Start: 2018-01-13

## (undated) RX ORDER — KETAMINE HYDROCHLORIDE 10 MG/ML
INJECTION, SOLUTION INTRAMUSCULAR; INTRAVENOUS
Status: DISPENSED
Start: 2018-01-18

## (undated) RX ORDER — FENTANYL CITRATE 50 UG/ML
INJECTION, SOLUTION INTRAMUSCULAR; INTRAVENOUS
Status: DISPENSED
Start: 2021-03-01

## (undated) RX ORDER — ACETAMINOPHEN 325 MG/1
TABLET ORAL
Status: DISPENSED
Start: 2018-01-13

## (undated) RX ORDER — SODIUM CHLORIDE, SODIUM LACTATE, POTASSIUM CHLORIDE, CALCIUM CHLORIDE 600; 310; 30; 20 MG/100ML; MG/100ML; MG/100ML; MG/100ML
INJECTION, SOLUTION INTRAVENOUS
Status: DISPENSED
Start: 2018-01-13

## (undated) RX ORDER — OXYCODONE HYDROCHLORIDE 5 MG/1
TABLET ORAL
Status: DISPENSED
Start: 2018-01-18

## (undated) RX ORDER — ONDANSETRON 2 MG/ML
INJECTION INTRAMUSCULAR; INTRAVENOUS
Status: DISPENSED
Start: 2021-03-01

## (undated) RX ORDER — DEXAMETHASONE SODIUM PHOSPHATE 10 MG/ML
INJECTION, SOLUTION INTRAMUSCULAR; INTRAVENOUS
Status: DISPENSED
Start: 2021-03-01

## (undated) RX ORDER — EPHEDRINE SULFATE 50 MG/ML
INJECTION, SOLUTION INTRAMUSCULAR; INTRAVENOUS; SUBCUTANEOUS
Status: DISPENSED
Start: 2018-01-13

## (undated) RX ORDER — FENTANYL CITRATE 50 UG/ML
INJECTION, SOLUTION INTRAMUSCULAR; INTRAVENOUS
Status: DISPENSED
Start: 2018-01-18

## (undated) RX ORDER — LIDOCAINE HYDROCHLORIDE 20 MG/ML
INJECTION, SOLUTION EPIDURAL; INFILTRATION; INTRACAUDAL; PERINEURAL
Status: DISPENSED
Start: 2018-01-14

## (undated) RX ORDER — PROPOFOL 10 MG/ML
INJECTION, EMULSION INTRAVENOUS
Status: DISPENSED
Start: 2018-01-15

## (undated) RX ORDER — HYDROMORPHONE HYDROCHLORIDE 2 MG/1
TABLET ORAL
Status: DISPENSED
Start: 2018-01-15

## (undated) RX ORDER — SODIUM CHLORIDE 9 MG/ML
INJECTION, SOLUTION INTRAVENOUS
Status: DISPENSED
Start: 2018-01-13

## (undated) RX ORDER — FENTANYL CITRATE 50 UG/ML
INJECTION, SOLUTION INTRAMUSCULAR; INTRAVENOUS
Status: DISPENSED
Start: 2018-01-14

## (undated) RX ORDER — HYDRALAZINE HYDROCHLORIDE 20 MG/ML
INJECTION INTRAMUSCULAR; INTRAVENOUS
Status: DISPENSED
Start: 2018-01-15

## (undated) RX ORDER — FENTANYL CITRATE 50 UG/ML
INJECTION, SOLUTION INTRAMUSCULAR; INTRAVENOUS
Status: DISPENSED
Start: 2018-01-15